# Patient Record
Sex: FEMALE | Race: BLACK OR AFRICAN AMERICAN | Employment: OTHER | URBAN - METROPOLITAN AREA
[De-identification: names, ages, dates, MRNs, and addresses within clinical notes are randomized per-mention and may not be internally consistent; named-entity substitution may affect disease eponyms.]

---

## 2017-01-09 ENCOUNTER — OFFICE VISIT (OUTPATIENT)
Dept: ORTHOPEDIC SURGERY | Age: 57
End: 2017-01-09

## 2017-01-09 VITALS
TEMPERATURE: 98.1 F | BODY MASS INDEX: 37.38 KG/M2 | DIASTOLIC BLOOD PRESSURE: 100 MMHG | SYSTOLIC BLOOD PRESSURE: 154 MMHG | HEIGHT: 61 IN | WEIGHT: 198 LBS | HEART RATE: 77 BPM

## 2017-01-09 DIAGNOSIS — M25.561 RIGHT KNEE PAIN, UNSPECIFIED CHRONICITY: Primary | ICD-10-CM

## 2017-01-09 PROCEDURE — 99213 OFFICE O/P EST LOW 20 MIN: CPT | Performed by: ORTHOPAEDIC SURGERY

## 2017-01-10 RX ORDER — TRAMADOL HYDROCHLORIDE 50 MG/1
50 TABLET ORAL EVERY 6 HOURS PRN
Qty: 60 TABLET | Refills: 0 | Status: SHIPPED | OUTPATIENT
Start: 2017-01-10 | End: 2017-02-20 | Stop reason: SDUPTHER

## 2017-01-11 ENCOUNTER — TELEPHONE (OUTPATIENT)
Dept: ORTHOPEDIC SURGERY | Age: 57
End: 2017-01-11

## 2017-01-12 ENCOUNTER — TELEPHONE (OUTPATIENT)
Dept: ORTHOPEDIC SURGERY | Age: 57
End: 2017-01-12

## 2017-02-20 ENCOUNTER — OFFICE VISIT (OUTPATIENT)
Dept: ORTHOPEDIC SURGERY | Age: 57
End: 2017-02-20

## 2017-02-20 VITALS
HEIGHT: 61 IN | TEMPERATURE: 98.6 F | HEART RATE: 79 BPM | WEIGHT: 198 LBS | SYSTOLIC BLOOD PRESSURE: 137 MMHG | BODY MASS INDEX: 37.38 KG/M2 | DIASTOLIC BLOOD PRESSURE: 95 MMHG

## 2017-02-20 DIAGNOSIS — M17.11 PRIMARY OSTEOARTHRITIS OF RIGHT KNEE: Primary | ICD-10-CM

## 2017-02-20 PROCEDURE — 99212 OFFICE O/P EST SF 10 MIN: CPT | Performed by: PHYSICIAN ASSISTANT

## 2017-02-20 RX ORDER — TRAMADOL HYDROCHLORIDE 50 MG/1
50 TABLET ORAL EVERY 6 HOURS PRN
Qty: 60 TABLET | Refills: 0 | Status: SHIPPED | OUTPATIENT
Start: 2017-02-20 | End: 2017-05-11 | Stop reason: SDUPTHER

## 2017-03-28 ENCOUNTER — OFFICE VISIT (OUTPATIENT)
Dept: ORTHOPEDIC SURGERY | Age: 57
End: 2017-03-28

## 2017-03-28 VITALS
BODY MASS INDEX: 37.38 KG/M2 | WEIGHT: 198 LBS | SYSTOLIC BLOOD PRESSURE: 147 MMHG | DIASTOLIC BLOOD PRESSURE: 87 MMHG | RESPIRATION RATE: 16 BRPM | HEART RATE: 91 BPM | TEMPERATURE: 97 F | HEIGHT: 61 IN

## 2017-03-28 DIAGNOSIS — M17.11 PRIMARY OSTEOARTHRITIS OF RIGHT KNEE: Primary | ICD-10-CM

## 2017-03-28 PROCEDURE — 99212 OFFICE O/P EST SF 10 MIN: CPT | Performed by: ORTHOPAEDIC SURGERY

## 2017-04-18 ENCOUNTER — TELEPHONE (OUTPATIENT)
Dept: ORTHOPEDIC SURGERY | Age: 57
End: 2017-04-18

## 2017-05-09 ENCOUNTER — OFFICE VISIT (OUTPATIENT)
Dept: PRIMARY CARE CLINIC | Age: 57
End: 2017-05-09

## 2017-05-09 VITALS
HEART RATE: 76 BPM | RESPIRATION RATE: 14 BRPM | HEIGHT: 61 IN | TEMPERATURE: 98.8 F | BODY MASS INDEX: 38.02 KG/M2 | SYSTOLIC BLOOD PRESSURE: 132 MMHG | DIASTOLIC BLOOD PRESSURE: 83 MMHG | OXYGEN SATURATION: 98 % | WEIGHT: 201.4 LBS

## 2017-05-09 DIAGNOSIS — Z00.00 PREVENTATIVE HEALTH CARE: Primary | ICD-10-CM

## 2017-05-09 DIAGNOSIS — Z12.39 BREAST CANCER SCREENING: ICD-10-CM

## 2017-05-09 DIAGNOSIS — J45.20 MILD INTERMITTENT ASTHMA WITHOUT COMPLICATION: ICD-10-CM

## 2017-05-09 DIAGNOSIS — K57.30 DIVERTICULOSIS OF LARGE INTESTINE WITHOUT HEMORRHAGE: ICD-10-CM

## 2017-05-09 DIAGNOSIS — Z12.4 CERVICAL CANCER SCREENING: ICD-10-CM

## 2017-05-09 DIAGNOSIS — J30.2 SEASONAL ALLERGIC RHINITIS, UNSPECIFIED ALLERGIC RHINITIS TRIGGER: ICD-10-CM

## 2017-05-09 PROCEDURE — 99386 PREV VISIT NEW AGE 40-64: CPT | Performed by: INTERNAL MEDICINE

## 2017-05-09 RX ORDER — CETIRIZINE HYDROCHLORIDE 10 MG/1
10 TABLET ORAL DAILY
Qty: 30 TABLET | Refills: 3 | Status: SHIPPED | OUTPATIENT
Start: 2017-05-09 | End: 2019-04-02

## 2017-05-09 RX ORDER — FLUTICASONE PROPIONATE 50 MCG
1 SPRAY, SUSPENSION (ML) NASAL DAILY
Qty: 1 BOTTLE | Refills: 3 | Status: SHIPPED | OUTPATIENT
Start: 2017-05-09 | End: 2019-04-02 | Stop reason: SDUPTHER

## 2017-05-09 RX ORDER — ALBUTEROL SULFATE 90 UG/1
2 AEROSOL, METERED RESPIRATORY (INHALATION) EVERY 6 HOURS PRN
Qty: 1 INHALER | Refills: 3 | Status: SHIPPED | OUTPATIENT
Start: 2017-05-09 | End: 2018-05-21 | Stop reason: SDUPTHER

## 2017-05-09 ASSESSMENT — PATIENT HEALTH QUESTIONNAIRE - PHQ9
1. LITTLE INTEREST OR PLEASURE IN DOING THINGS: 0
2. FEELING DOWN, DEPRESSED OR HOPELESS: 0
SUM OF ALL RESPONSES TO PHQ QUESTIONS 1-9: 0
SUM OF ALL RESPONSES TO PHQ9 QUESTIONS 1 & 2: 0

## 2017-05-09 ASSESSMENT — ENCOUNTER SYMPTOMS
SHORTNESS OF BREATH: 0
RHINORRHEA: 0
DIARRHEA: 0
ABDOMINAL DISTENTION: 0
CONSTIPATION: 0
NAUSEA: 0
ABDOMINAL PAIN: 0
VOMITING: 0
SORE THROAT: 0
COUGH: 0

## 2017-05-10 ENCOUNTER — TELEPHONE (OUTPATIENT)
Dept: ORTHOPEDIC SURGERY | Age: 57
End: 2017-05-10

## 2017-05-11 ENCOUNTER — TELEPHONE (OUTPATIENT)
Dept: ORTHOPEDIC SURGERY | Age: 57
End: 2017-05-11

## 2017-05-16 RX ORDER — TRAMADOL HYDROCHLORIDE 50 MG/1
TABLET ORAL
Qty: 60 TABLET | Refills: 0 | Status: SHIPPED | OUTPATIENT
Start: 2017-05-16 | End: 2017-05-30 | Stop reason: HOSPADM

## 2017-05-17 ENCOUNTER — TELEPHONE (OUTPATIENT)
Dept: ORTHOPEDIC SURGERY | Age: 57
End: 2017-05-17

## 2017-05-23 ENCOUNTER — OFFICE VISIT (OUTPATIENT)
Dept: PRIMARY CARE CLINIC | Age: 57
End: 2017-05-23

## 2017-05-23 VITALS
BODY MASS INDEX: 38.44 KG/M2 | HEIGHT: 61 IN | TEMPERATURE: 96 F | HEART RATE: 76 BPM | SYSTOLIC BLOOD PRESSURE: 132 MMHG | OXYGEN SATURATION: 97 % | RESPIRATION RATE: 12 BRPM | WEIGHT: 203.6 LBS | DIASTOLIC BLOOD PRESSURE: 82 MMHG

## 2017-05-23 DIAGNOSIS — Z01.818 PRE-OP EXAM: Primary | ICD-10-CM

## 2017-05-23 DIAGNOSIS — Z01.818 PRE-OP EXAM: ICD-10-CM

## 2017-05-23 DIAGNOSIS — Z00.00 PREVENTATIVE HEALTH CARE: ICD-10-CM

## 2017-05-23 LAB
ALBUMIN SERPL-MCNC: 4.3 G/DL (ref 3.4–5)
ALP BLD-CCNC: 79 U/L (ref 40–129)
ALT SERPL-CCNC: 25 U/L (ref 10–40)
ANION GAP SERPL CALCULATED.3IONS-SCNC: 13 MMOL/L (ref 3–16)
AST SERPL-CCNC: 24 U/L (ref 15–37)
BASOPHILS ABSOLUTE: 0 K/UL (ref 0–0.2)
BASOPHILS RELATIVE PERCENT: 0.6 %
BILIRUB SERPL-MCNC: 0.3 MG/DL (ref 0–1)
BILIRUBIN DIRECT: <0.2 MG/DL (ref 0–0.3)
BILIRUBIN, INDIRECT: NORMAL MG/DL (ref 0–1)
BUN BLDV-MCNC: 9 MG/DL (ref 7–20)
CALCIUM SERPL-MCNC: 9.8 MG/DL (ref 8.3–10.6)
CHLORIDE BLD-SCNC: 101 MMOL/L (ref 99–110)
CHOLESTEROL, TOTAL: 204 MG/DL (ref 0–199)
CO2: 28 MMOL/L (ref 21–32)
CREAT SERPL-MCNC: 0.7 MG/DL (ref 0.6–1.1)
EOSINOPHILS ABSOLUTE: 0.4 K/UL (ref 0–0.6)
EOSINOPHILS RELATIVE PERCENT: 6 %
GFR AFRICAN AMERICAN: >60
GFR NON-AFRICAN AMERICAN: >60
GLUCOSE BLD-MCNC: 84 MG/DL (ref 70–99)
HCT VFR BLD CALC: 43.5 % (ref 36–48)
HDLC SERPL-MCNC: 59 MG/DL (ref 40–60)
HEMOGLOBIN: 13.4 G/DL (ref 12–16)
HEPATITIS C ANTIBODY INTERPRETATION: NORMAL
INR BLD: 0.99 (ref 0.85–1.15)
LDL CHOLESTEROL CALCULATED: 114 MG/DL
LYMPHOCYTES ABSOLUTE: 1.5 K/UL (ref 1–5.1)
LYMPHOCYTES RELATIVE PERCENT: 24.2 %
MCH RBC QN AUTO: 25.7 PG (ref 26–34)
MCHC RBC AUTO-ENTMCNC: 30.7 G/DL (ref 31–36)
MCV RBC AUTO: 83.8 FL (ref 80–100)
MONOCYTES ABSOLUTE: 0.3 K/UL (ref 0–1.3)
MONOCYTES RELATIVE PERCENT: 4.8 %
NEUTROPHILS ABSOLUTE: 3.9 K/UL (ref 1.7–7.7)
NEUTROPHILS RELATIVE PERCENT: 64.4 %
PDW BLD-RTO: 15.3 % (ref 12.4–15.4)
PHOSPHORUS: 3.2 MG/DL (ref 2.5–4.9)
PLATELET # BLD: 213 K/UL (ref 135–450)
PMV BLD AUTO: 9.2 FL (ref 5–10.5)
POTASSIUM SERPL-SCNC: 4.5 MMOL/L (ref 3.5–5.1)
PROTHROMBIN TIME: 11.2 SEC (ref 9.6–13)
RBC # BLD: 5.19 M/UL (ref 4–5.2)
SODIUM BLD-SCNC: 142 MMOL/L (ref 136–145)
TOTAL PROTEIN: 7.4 G/DL (ref 6.4–8.2)
TRIGL SERPL-MCNC: 153 MG/DL (ref 0–150)
TROPONIN: <0.01 NG/ML
TSH REFLEX: 2.01 UIU/ML (ref 0.27–4.2)
VLDLC SERPL CALC-MCNC: 31 MG/DL
WBC # BLD: 6.1 K/UL (ref 4–11)

## 2017-05-23 PROCEDURE — 93000 ELECTROCARDIOGRAM COMPLETE: CPT | Performed by: INTERNAL MEDICINE

## 2017-05-23 PROCEDURE — 99214 OFFICE O/P EST MOD 30 MIN: CPT | Performed by: INTERNAL MEDICINE

## 2017-05-23 ASSESSMENT — ENCOUNTER SYMPTOMS
SHORTNESS OF BREATH: 0
VOMITING: 0
ABDOMINAL PAIN: 0
NAUSEA: 0
DIARRHEA: 0

## 2017-05-24 ENCOUNTER — CLINICAL DOCUMENTATION (OUTPATIENT)
Dept: PRIMARY CARE CLINIC | Age: 57
End: 2017-05-24

## 2017-05-24 PROBLEM — E78.2 MIXED HYPERLIPIDEMIA: Status: ACTIVE | Noted: 2017-05-24

## 2017-05-24 LAB
ESTIMATED AVERAGE GLUCOSE: 134.1 MG/DL
HBA1C MFR BLD: 6.3 %

## 2017-05-25 ENCOUNTER — PAT TELEPHONE (OUTPATIENT)
Dept: PREADMISSION TESTING | Age: 57
End: 2017-05-25

## 2017-05-25 ENCOUNTER — SURG/PROC ORDERS (OUTPATIENT)
Dept: ANESTHESIOLOGY | Age: 57
End: 2017-05-25

## 2017-05-25 ENCOUNTER — OFFICE VISIT (OUTPATIENT)
Dept: ORTHOPEDIC SURGERY | Age: 57
End: 2017-05-25

## 2017-05-25 VITALS
HEIGHT: 61 IN | TEMPERATURE: 97.1 F | DIASTOLIC BLOOD PRESSURE: 90 MMHG | HEART RATE: 74 BPM | SYSTOLIC BLOOD PRESSURE: 150 MMHG | BODY MASS INDEX: 38.33 KG/M2 | WEIGHT: 203 LBS

## 2017-05-25 DIAGNOSIS — S83.241A TEAR OF MEDIAL MENISCUS OF RIGHT KNEE, CURRENT, UNSPECIFIED TEAR TYPE, INITIAL ENCOUNTER: Primary | ICD-10-CM

## 2017-05-25 LAB — HIV-1 AND HIV-2 ANTIBODIES: NEGATIVE

## 2017-05-25 PROCEDURE — 99214 OFFICE O/P EST MOD 30 MIN: CPT | Performed by: ORTHOPAEDIC SURGERY

## 2017-05-25 RX ORDER — SODIUM CHLORIDE 9 MG/ML
INJECTION, SOLUTION INTRAVENOUS CONTINUOUS
Status: CANCELLED | OUTPATIENT
Start: 2017-05-25

## 2017-05-25 RX ORDER — SODIUM CHLORIDE 0.9 % (FLUSH) 0.9 %
10 SYRINGE (ML) INJECTION PRN
Status: CANCELLED | OUTPATIENT
Start: 2017-05-25

## 2017-05-25 RX ORDER — SODIUM CHLORIDE 0.9 % (FLUSH) 0.9 %
10 SYRINGE (ML) INJECTION EVERY 12 HOURS SCHEDULED
Status: CANCELLED | OUTPATIENT
Start: 2017-05-25

## 2017-05-30 ENCOUNTER — HOSPITAL ENCOUNTER (OUTPATIENT)
Dept: SURGERY | Age: 57
Discharge: HOME | End: 2017-05-30
Attending: ORTHOPAEDIC SURGERY | Admitting: ORTHOPAEDIC SURGERY

## 2017-05-30 VITALS
HEIGHT: 61 IN | HEART RATE: 61 BPM | TEMPERATURE: 97 F | OXYGEN SATURATION: 95 % | BODY MASS INDEX: 37.81 KG/M2 | DIASTOLIC BLOOD PRESSURE: 94 MMHG | SYSTOLIC BLOOD PRESSURE: 148 MMHG | WEIGHT: 200.29 LBS | RESPIRATION RATE: 16 BRPM

## 2017-05-30 LAB
GLUCOSE BLD-MCNC: 74 MG/DL (ref 70–99)
PERFORMED ON: NORMAL

## 2017-05-30 RX ORDER — SODIUM CHLORIDE 0.9 % (FLUSH) 0.9 %
10 SYRINGE (ML) INJECTION PRN
Status: DISCONTINUED | OUTPATIENT
Start: 2017-05-30 | End: 2017-05-31 | Stop reason: HOSPADM

## 2017-05-30 RX ORDER — SODIUM CHLORIDE 0.9 % (FLUSH) 0.9 %
10 SYRINGE (ML) INJECTION EVERY 12 HOURS SCHEDULED
Status: DISCONTINUED | OUTPATIENT
Start: 2017-05-30 | End: 2017-05-31 | Stop reason: HOSPADM

## 2017-05-30 RX ORDER — LABETALOL HYDROCHLORIDE 5 MG/ML
5 INJECTION, SOLUTION INTRAVENOUS EVERY 10 MIN PRN
Status: DISCONTINUED | OUTPATIENT
Start: 2017-05-30 | End: 2017-05-31 | Stop reason: HOSPADM

## 2017-05-30 RX ORDER — FENTANYL CITRATE 50 UG/ML
25 INJECTION, SOLUTION INTRAMUSCULAR; INTRAVENOUS EVERY 5 MIN PRN
Status: DISCONTINUED | OUTPATIENT
Start: 2017-05-30 | End: 2017-05-31 | Stop reason: HOSPADM

## 2017-05-30 RX ORDER — SODIUM CHLORIDE 9 MG/ML
INJECTION, SOLUTION INTRAVENOUS CONTINUOUS
Status: DISCONTINUED | OUTPATIENT
Start: 2017-05-30 | End: 2017-05-31 | Stop reason: HOSPADM

## 2017-05-30 RX ORDER — OXYCODONE HYDROCHLORIDE AND ACETAMINOPHEN 5; 325 MG/1; MG/1
2 TABLET ORAL EVERY 4 HOURS PRN
Status: DISCONTINUED | OUTPATIENT
Start: 2017-05-30 | End: 2017-05-31 | Stop reason: HOSPADM

## 2017-05-30 RX ORDER — OXYCODONE HYDROCHLORIDE AND ACETAMINOPHEN 5; 325 MG/1; MG/1
TABLET ORAL
Qty: 30 TABLET | Refills: 0 | Status: SHIPPED | OUTPATIENT
Start: 2017-05-30 | End: 2017-06-19 | Stop reason: SDUPTHER

## 2017-05-30 RX ORDER — PROMETHAZINE HYDROCHLORIDE 25 MG/ML
6.25 INJECTION, SOLUTION INTRAMUSCULAR; INTRAVENOUS
Status: ACTIVE | OUTPATIENT
Start: 2017-05-30 | End: 2017-05-30

## 2017-05-30 RX ADMIN — SODIUM CHLORIDE: 9 INJECTION, SOLUTION INTRAVENOUS at 14:01

## 2017-05-30 RX ADMIN — OXYCODONE HYDROCHLORIDE AND ACETAMINOPHEN 2 TABLET: 5; 325 TABLET ORAL at 16:34

## 2017-05-30 ASSESSMENT — PAIN SCALES - GENERAL
PAINLEVEL_OUTOF10: 10
PAINLEVEL_OUTOF10: 8
PAINLEVEL_OUTOF10: 10
PAINLEVEL_OUTOF10: 0

## 2017-05-30 ASSESSMENT — PAIN DESCRIPTION - DESCRIPTORS: DESCRIPTORS: ACHING

## 2017-05-30 ASSESSMENT — PAIN - FUNCTIONAL ASSESSMENT: PAIN_FUNCTIONAL_ASSESSMENT: 0-10

## 2017-05-30 ASSESSMENT — PAIN DESCRIPTION - ORIENTATION
ORIENTATION: RIGHT
ORIENTATION: RIGHT

## 2017-05-30 ASSESSMENT — PAIN DESCRIPTION - LOCATION
LOCATION: KNEE
LOCATION: KNEE

## 2017-05-30 ASSESSMENT — PAIN DESCRIPTION - PAIN TYPE
TYPE: SURGICAL PAIN
TYPE: SURGICAL PAIN

## 2017-05-31 ENCOUNTER — TELEPHONE (OUTPATIENT)
Dept: ORTHOPEDIC SURGERY | Age: 57
End: 2017-05-31

## 2017-06-19 ENCOUNTER — OFFICE VISIT (OUTPATIENT)
Dept: ORTHOPEDIC SURGERY | Age: 57
End: 2017-06-19

## 2017-06-19 VITALS — BODY MASS INDEX: 37.76 KG/M2 | WEIGHT: 200 LBS | HEIGHT: 61 IN | TEMPERATURE: 97.3 F

## 2017-06-19 DIAGNOSIS — S83.241A TEAR OF MEDIAL MENISCUS OF RIGHT KNEE, CURRENT, UNSPECIFIED TEAR TYPE, INITIAL ENCOUNTER: Primary | ICD-10-CM

## 2017-06-19 PROCEDURE — 99024 POSTOP FOLLOW-UP VISIT: CPT | Performed by: ORTHOPAEDIC SURGERY

## 2017-06-19 RX ORDER — OXYCODONE HYDROCHLORIDE AND ACETAMINOPHEN 5; 325 MG/1; MG/1
TABLET ORAL
Qty: 30 TABLET | Refills: 0 | Status: SHIPPED | OUTPATIENT
Start: 2017-06-19 | End: 2018-02-14

## 2017-06-28 ENCOUNTER — HOSPITAL ENCOUNTER (OUTPATIENT)
Dept: PHYSICAL THERAPY | Age: 57
Discharge: HOME | End: 2017-06-30
Admitting: ORTHOPAEDIC SURGERY

## 2017-06-30 ENCOUNTER — HOSPITAL ENCOUNTER (OUTPATIENT)
Dept: PHYSICAL THERAPY | Age: 57
Discharge: HOME | End: 2017-06-30
Admitting: ORTHOPAEDIC SURGERY

## 2017-07-01 ENCOUNTER — HOSPITAL ENCOUNTER (OUTPATIENT)
Dept: PHYSICAL THERAPY | Age: 57
Discharge: HOME | End: 2017-07-31
Attending: ORTHOPAEDIC SURGERY | Admitting: ORTHOPAEDIC SURGERY

## 2017-07-11 ENCOUNTER — OFFICE VISIT (OUTPATIENT)
Dept: PRIMARY CARE CLINIC | Age: 57
End: 2017-07-11

## 2017-07-11 VITALS
WEIGHT: 209.8 LBS | SYSTOLIC BLOOD PRESSURE: 130 MMHG | OXYGEN SATURATION: 97 % | RESPIRATION RATE: 12 BRPM | DIASTOLIC BLOOD PRESSURE: 86 MMHG | BODY MASS INDEX: 39.64 KG/M2 | TEMPERATURE: 97.2 F | HEART RATE: 78 BPM

## 2017-07-11 DIAGNOSIS — Z23 NEED FOR 23-POLYVALENT PNEUMOCOCCAL POLYSACCHARIDE VACCINE: ICD-10-CM

## 2017-07-11 DIAGNOSIS — Z12.4 CERVICAL CANCER SCREENING: ICD-10-CM

## 2017-07-11 DIAGNOSIS — E66.01 MORBID OBESITY DUE TO EXCESS CALORIES (HCC): Primary | ICD-10-CM

## 2017-07-11 DIAGNOSIS — Z09 SURGERY FOLLOW-UP: ICD-10-CM

## 2017-07-11 DIAGNOSIS — Z12.39 BREAST CANCER SCREENING: ICD-10-CM

## 2017-07-11 PROCEDURE — 90732 PPSV23 VACC 2 YRS+ SUBQ/IM: CPT | Performed by: INTERNAL MEDICINE

## 2017-07-11 PROCEDURE — 99214 OFFICE O/P EST MOD 30 MIN: CPT | Performed by: INTERNAL MEDICINE

## 2017-07-11 PROCEDURE — 90471 IMMUNIZATION ADMIN: CPT | Performed by: INTERNAL MEDICINE

## 2017-07-11 RX ORDER — POLYETHYLENE GLYCOL 400 2.5 MG/ML
SOLUTION/ DROPS OPHTHALMIC
Refills: 8 | COMMUNITY
Start: 2017-06-27 | End: 2020-02-03 | Stop reason: ALTCHOICE

## 2017-07-11 RX ORDER — AZELASTINE HYDROCHLORIDE 0.5 MG/ML
SOLUTION/ DROPS OPHTHALMIC
Refills: 8 | COMMUNITY
Start: 2017-06-27 | End: 2020-02-03 | Stop reason: ALTCHOICE

## 2017-07-11 ASSESSMENT — ENCOUNTER SYMPTOMS
VOMITING: 0
COUGH: 0
SHORTNESS OF BREATH: 0
ABDOMINAL PAIN: 0
DIARRHEA: 0
NAUSEA: 0

## 2017-07-31 ENCOUNTER — OFFICE VISIT (OUTPATIENT)
Dept: ORTHOPEDIC SURGERY | Age: 57
End: 2017-07-31

## 2017-07-31 VITALS — HEIGHT: 61 IN | RESPIRATION RATE: 16 BRPM | BODY MASS INDEX: 39.46 KG/M2 | TEMPERATURE: 98.5 F | WEIGHT: 209 LBS

## 2017-07-31 DIAGNOSIS — S83.241A TEAR OF MEDIAL MENISCUS OF RIGHT KNEE, CURRENT, UNSPECIFIED TEAR TYPE, INITIAL ENCOUNTER: Primary | ICD-10-CM

## 2017-07-31 PROCEDURE — 99024 POSTOP FOLLOW-UP VISIT: CPT | Performed by: ORTHOPAEDIC SURGERY

## 2017-08-31 ENCOUNTER — OFFICE VISIT (OUTPATIENT)
Dept: ORTHOPEDIC SURGERY | Age: 57
End: 2017-08-31

## 2017-08-31 VITALS — BODY MASS INDEX: 39.46 KG/M2 | WEIGHT: 209 LBS | TEMPERATURE: 98.2 F | HEIGHT: 61 IN | RESPIRATION RATE: 16 BRPM

## 2017-08-31 DIAGNOSIS — S83.241A TEAR OF MEDIAL MENISCUS OF RIGHT KNEE, CURRENT, UNSPECIFIED TEAR TYPE, INITIAL ENCOUNTER: Primary | ICD-10-CM

## 2017-08-31 PROCEDURE — 99213 OFFICE O/P EST LOW 20 MIN: CPT | Performed by: PHYSICIAN ASSISTANT

## 2017-08-31 RX ORDER — TRAMADOL HYDROCHLORIDE 50 MG/1
50 TABLET ORAL EVERY 6 HOURS PRN
Qty: 28 TABLET | Refills: 0 | Status: SHIPPED | OUTPATIENT
Start: 2017-08-31 | End: 2017-09-10

## 2017-08-31 RX ORDER — NAPROXEN 500 MG/1
500 TABLET ORAL 2 TIMES DAILY WITH MEALS
Qty: 60 TABLET | Refills: 3 | Status: SHIPPED | OUTPATIENT
Start: 2017-08-31 | End: 2018-02-14

## 2017-09-25 ENCOUNTER — HOSPITAL ENCOUNTER (OUTPATIENT)
Dept: MAMMOGRAPHY | Age: 57
Discharge: HOME | End: 2017-09-25
Attending: INTERNAL MEDICINE | Admitting: INTERNAL MEDICINE

## 2017-09-25 DIAGNOSIS — Z12.31 VISIT FOR SCREENING MAMMOGRAM: ICD-10-CM

## 2017-11-28 ENCOUNTER — OFFICE VISIT (OUTPATIENT)
Dept: GYNECOLOGY | Age: 57
End: 2017-11-28

## 2017-11-28 VITALS
RESPIRATION RATE: 17 BRPM | WEIGHT: 209.25 LBS | HEART RATE: 73 BPM | DIASTOLIC BLOOD PRESSURE: 103 MMHG | HEIGHT: 61 IN | BODY MASS INDEX: 39.5 KG/M2 | SYSTOLIC BLOOD PRESSURE: 146 MMHG | TEMPERATURE: 96.4 F

## 2017-11-28 DIAGNOSIS — Z01.419 WELL WOMAN EXAM WITH ROUTINE GYNECOLOGICAL EXAM: Primary | ICD-10-CM

## 2017-11-28 PROCEDURE — 99386 PREV VISIT NEW AGE 40-64: CPT | Performed by: OBSTETRICS & GYNECOLOGY

## 2017-11-28 RX ORDER — TRAMADOL HYDROCHLORIDE 50 MG/1
TABLET ORAL
COMMUNITY
Start: 2016-06-01 | End: 2018-03-27

## 2017-11-28 ASSESSMENT — ENCOUNTER SYMPTOMS
RESPIRATORY NEGATIVE: 1
GASTROINTESTINAL NEGATIVE: 1
EYES NEGATIVE: 1

## 2017-11-28 NOTE — PROGRESS NOTES
Subjective:      Patient ID: Isidra Giordano is a 56 y.o. female. Patient is here for annual. Patient overall doing well. Review of Systems   Constitutional: Negative. HENT: Negative. Eyes: Negative. Respiratory: Negative. Cardiovascular: Negative. Gastrointestinal: Negative. Genitourinary: Negative. Musculoskeletal: Positive for arthralgias. Skin: Negative. Neurological: Negative. Psychiatric/Behavioral: Negative.       Date of Birth 1960  Past Medical History:   Diagnosis Date    Allergic rhinitis     Arthritis     Asthma     not currently medically treated    Hyperlipidemia     Neuropathy ([facility] [address]     Osteoarthritis     Wears glasses      Past Surgical History:   Procedure Laterality Date    CARPAL TUNNEL RELEASE Right 7-10-14    FOOT SURGERY Right     rods placed     OB History    Para Term  AB Living   6 6 6     6   SAB TAB Ectopic Molar Multiple Live Births           1        # Outcome Date GA Lbr Lior/2nd Weight Sex Delivery Anes PTL Lv   6 Term  40w0d   M Vag-Spont      5 Term 12 37w0d   M Vag-Spont      4 Term 46 37w0d   F Vag-Spont      3 Term 12 37w0d   M Vag-Spont      2 Term 26 37w0d   F Vag-Spont      1 Term 36 37w0d   M Vag-Spont           Social History     Social History    Marital status: Single     Spouse name: N/A    Number of children: 10    Years of education: N/A     Occupational History    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 Abdominal: Soft. Bowel sounds are normal. She exhibits no distension and no mass. There is no hepatomegaly. There is no tenderness. There is no rebound and no guarding. No hernia. Genitourinary: Rectum normal, vagina normal and uterus normal. Rectal exam shows no external hemorrhoid, no internal hemorrhoid, no fissure, no mass, no tenderness and guaiac negative stool. No breast swelling, tenderness, discharge or bleeding. There is no rash, tenderness, lesion or injury on the right labia. There is no rash, tenderness, lesion or injury on the left labia. Uterus is not deviated, not enlarged, not fixed and not tender. Cervix exhibits no motion tenderness, no discharge and no friability. Right adnexum displays no mass, no tenderness and no fullness. Left adnexum displays no mass, no tenderness and no fullness. No erythema, tenderness or bleeding in the vagina. No foreign body in the vagina. No signs of injury around the vagina. No vaginal discharge found. Genitourinary Comments: Normal urethral meatus, nl urethra, nl bladder. Musculoskeletal: Normal range of motion. She exhibits no edema or tenderness. Lymphadenopathy:     She has no cervical adenopathy. Right: No inguinal adenopathy present. Left: No inguinal adenopathy present. Neurological: She is alert and oriented to person, place, and time. She has normal reflexes. Skin: Skin is warm and dry. No rash noted. She is not diaphoretic. No erythema. Psychiatric: She has a normal mood and affect. Her behavior is normal. Judgment and thought content normal.       Assessment:       1. Annual  2. Menopause      Plan:      1. Pap, calcium, exercise, mammogram, hemocult negative  2.  Stable symptoms

## 2017-11-30 LAB
HPV COMMENT: NORMAL
HPV TYPE 16: NOT DETECTED
HPV TYPE 18: NOT DETECTED
HPVOH (OTHER TYPES): NOT DETECTED

## 2018-02-14 ENCOUNTER — OFFICE VISIT (OUTPATIENT)
Dept: PRIMARY CARE CLINIC | Age: 58
End: 2018-02-14

## 2018-02-14 VITALS
BODY MASS INDEX: 39.65 KG/M2 | DIASTOLIC BLOOD PRESSURE: 73 MMHG | SYSTOLIC BLOOD PRESSURE: 140 MMHG | WEIGHT: 210 LBS | TEMPERATURE: 97.9 F | OXYGEN SATURATION: 94 % | HEIGHT: 61 IN | HEART RATE: 80 BPM

## 2018-02-14 DIAGNOSIS — Z12.11 COLON CANCER SCREENING: ICD-10-CM

## 2018-02-14 DIAGNOSIS — I10 ESSENTIAL HYPERTENSION: ICD-10-CM

## 2018-02-14 DIAGNOSIS — I10 ESSENTIAL HYPERTENSION: Primary | ICD-10-CM

## 2018-02-14 DIAGNOSIS — Z00.00 PREVENTATIVE HEALTH CARE: ICD-10-CM

## 2018-02-14 LAB
ALBUMIN SERPL-MCNC: 4.4 G/DL (ref 3.4–5)
ALP BLD-CCNC: 71 U/L (ref 40–129)
ALT SERPL-CCNC: 13 U/L (ref 10–40)
ANION GAP SERPL CALCULATED.3IONS-SCNC: 13 MMOL/L (ref 3–16)
AST SERPL-CCNC: 16 U/L (ref 15–37)
BASOPHILS ABSOLUTE: 0 K/UL (ref 0–0.2)
BASOPHILS RELATIVE PERCENT: 0.5 %
BILIRUB SERPL-MCNC: 0.3 MG/DL (ref 0–1)
BILIRUBIN DIRECT: <0.2 MG/DL (ref 0–0.3)
BILIRUBIN, INDIRECT: NORMAL MG/DL (ref 0–1)
BUN BLDV-MCNC: 8 MG/DL (ref 7–20)
CALCIUM SERPL-MCNC: 9.4 MG/DL (ref 8.3–10.6)
CHLORIDE BLD-SCNC: 101 MMOL/L (ref 99–110)
CHOLESTEROL, TOTAL: 190 MG/DL (ref 0–199)
CO2: 28 MMOL/L (ref 21–32)
CREAT SERPL-MCNC: 0.7 MG/DL (ref 0.6–1.1)
EOSINOPHILS ABSOLUTE: 0.2 K/UL (ref 0–0.6)
EOSINOPHILS RELATIVE PERCENT: 3.4 %
GFR AFRICAN AMERICAN: >60
GFR NON-AFRICAN AMERICAN: >60
GLUCOSE BLD-MCNC: 101 MG/DL (ref 70–99)
HCT VFR BLD CALC: 42.1 % (ref 36–48)
HDLC SERPL-MCNC: 57 MG/DL (ref 40–60)
HEMOGLOBIN: 13.4 G/DL (ref 12–16)
LDL CHOLESTEROL CALCULATED: 102 MG/DL
LYMPHOCYTES ABSOLUTE: 1.7 K/UL (ref 1–5.1)
LYMPHOCYTES RELATIVE PERCENT: 23.6 %
MCH RBC QN AUTO: 26.9 PG (ref 26–34)
MCHC RBC AUTO-ENTMCNC: 31.9 G/DL (ref 31–36)
MCV RBC AUTO: 84.3 FL (ref 80–100)
MONOCYTES ABSOLUTE: 0.5 K/UL (ref 0–1.3)
MONOCYTES RELATIVE PERCENT: 6.1 %
NEUTROPHILS ABSOLUTE: 4.9 K/UL (ref 1.7–7.7)
NEUTROPHILS RELATIVE PERCENT: 66.4 %
PDW BLD-RTO: 15.9 % (ref 12.4–15.4)
PHOSPHORUS: 3.1 MG/DL (ref 2.5–4.9)
PLATELET # BLD: 224 K/UL (ref 135–450)
PMV BLD AUTO: 9.8 FL (ref 5–10.5)
POTASSIUM SERPL-SCNC: 4.2 MMOL/L (ref 3.5–5.1)
RBC # BLD: 4.99 M/UL (ref 4–5.2)
SODIUM BLD-SCNC: 142 MMOL/L (ref 136–145)
TOTAL PROTEIN: 7.3 G/DL (ref 6.4–8.2)
TRIGL SERPL-MCNC: 157 MG/DL (ref 0–150)
TSH REFLEX: 3.76 UIU/ML (ref 0.27–4.2)
VLDLC SERPL CALC-MCNC: 31 MG/DL
WBC # BLD: 7.4 K/UL (ref 4–11)

## 2018-02-14 PROCEDURE — G8417 CALC BMI ABV UP PARAM F/U: HCPCS | Performed by: INTERNAL MEDICINE

## 2018-02-14 PROCEDURE — G8484 FLU IMMUNIZE NO ADMIN: HCPCS | Performed by: INTERNAL MEDICINE

## 2018-02-14 PROCEDURE — 3014F SCREEN MAMMO DOC REV: CPT | Performed by: INTERNAL MEDICINE

## 2018-02-14 PROCEDURE — 3017F COLORECTAL CA SCREEN DOC REV: CPT | Performed by: INTERNAL MEDICINE

## 2018-02-14 PROCEDURE — 99214 OFFICE O/P EST MOD 30 MIN: CPT | Performed by: INTERNAL MEDICINE

## 2018-02-14 PROCEDURE — G8427 DOCREV CUR MEDS BY ELIG CLIN: HCPCS | Performed by: INTERNAL MEDICINE

## 2018-02-14 PROCEDURE — 1036F TOBACCO NON-USER: CPT | Performed by: INTERNAL MEDICINE

## 2018-02-14 RX ORDER — HYDROCHLOROTHIAZIDE 12.5 MG/1
12.5 TABLET ORAL DAILY
Qty: 30 TABLET | Refills: 3 | Status: SHIPPED | OUTPATIENT
Start: 2018-02-14 | End: 2018-03-27 | Stop reason: SDUPTHER

## 2018-02-15 LAB
ESTIMATED AVERAGE GLUCOSE: 131.2 MG/DL
HBA1C MFR BLD: 6.2 %

## 2018-02-16 PROBLEM — I10 ESSENTIAL HYPERTENSION: Status: ACTIVE | Noted: 2018-02-16

## 2018-02-16 ASSESSMENT — ENCOUNTER SYMPTOMS
SHORTNESS OF BREATH: 0
VOMITING: 0
NAUSEA: 0
DIARRHEA: 0
ABDOMINAL PAIN: 0
COUGH: 0
CONSTIPATION: 0

## 2018-03-27 ENCOUNTER — OFFICE VISIT (OUTPATIENT)
Dept: PRIMARY CARE CLINIC | Age: 58
End: 2018-03-27

## 2018-03-27 VITALS
BODY MASS INDEX: 38.99 KG/M2 | DIASTOLIC BLOOD PRESSURE: 82 MMHG | TEMPERATURE: 98.2 F | WEIGHT: 206.5 LBS | SYSTOLIC BLOOD PRESSURE: 118 MMHG | HEIGHT: 61 IN | OXYGEN SATURATION: 94 % | HEART RATE: 79 BPM

## 2018-03-27 DIAGNOSIS — M25.561 CHRONIC PAIN OF RIGHT KNEE: Primary | ICD-10-CM

## 2018-03-27 DIAGNOSIS — G89.29 CHRONIC PAIN OF RIGHT KNEE: Primary | ICD-10-CM

## 2018-03-27 DIAGNOSIS — I10 ESSENTIAL HYPERTENSION: ICD-10-CM

## 2018-03-27 PROCEDURE — 3014F SCREEN MAMMO DOC REV: CPT | Performed by: INTERNAL MEDICINE

## 2018-03-27 PROCEDURE — 99214 OFFICE O/P EST MOD 30 MIN: CPT | Performed by: INTERNAL MEDICINE

## 2018-03-27 PROCEDURE — 3017F COLORECTAL CA SCREEN DOC REV: CPT | Performed by: INTERNAL MEDICINE

## 2018-03-27 PROCEDURE — G8484 FLU IMMUNIZE NO ADMIN: HCPCS | Performed by: INTERNAL MEDICINE

## 2018-03-27 PROCEDURE — G8427 DOCREV CUR MEDS BY ELIG CLIN: HCPCS | Performed by: INTERNAL MEDICINE

## 2018-03-27 PROCEDURE — G8417 CALC BMI ABV UP PARAM F/U: HCPCS | Performed by: INTERNAL MEDICINE

## 2018-03-27 PROCEDURE — 1036F TOBACCO NON-USER: CPT | Performed by: INTERNAL MEDICINE

## 2018-03-27 RX ORDER — HYDROCHLOROTHIAZIDE 12.5 MG/1
12.5 TABLET ORAL DAILY
Qty: 30 TABLET | Refills: 3 | Status: SHIPPED | OUTPATIENT
Start: 2018-03-27 | End: 2018-09-27 | Stop reason: SDUPTHER

## 2018-03-27 RX ORDER — TRAMADOL HYDROCHLORIDE 50 MG/1
50 TABLET ORAL EVERY 6 HOURS PRN
Qty: 28 TABLET | Refills: 0 | Status: SHIPPED | OUTPATIENT
Start: 2018-03-27 | End: 2018-04-03

## 2018-03-27 ASSESSMENT — ENCOUNTER SYMPTOMS
CONSTIPATION: 0
SHORTNESS OF BREATH: 0
VOMITING: 0
ABDOMINAL PAIN: 0
COUGH: 0
NAUSEA: 0
DIARRHEA: 0

## 2018-03-27 NOTE — PATIENT INSTRUCTIONS
you take decongestants or anti-inflammatory medicine, such as ibuprofen. Some of these medicines can raise blood pressure. · Learn how to check your blood pressure at home. Lifestyle changes  · Stay at a healthy weight. This is especially important if you put on weight around the waist. Losing even 10 pounds can help you lower your blood pressure. · If your doctor recommends it, get more exercise. Walking is a good choice. Bit by bit, increase the amount you walk every day. Try for at least 30 minutes on most days of the week. You also may want to swim, bike, or do other activities. · Avoid or limit alcohol. Talk to your doctor about whether you can drink any alcohol. · Try to limit how much sodium you eat to less than 2,300 milligrams (mg) a day. Your doctor may ask you to try to eat less than 1,500 mg a day. · Eat plenty of fruits (such as bananas and oranges), vegetables, legumes, whole grains, and low-fat dairy products. · Lower the amount of saturated fat in your diet. Saturated fat is found in animal products such as milk, cheese, and meat. Limiting these foods may help you lose weight and also lower your risk for heart disease. · Do not smoke. Smoking increases your risk for heart attack and stroke. If you need help quitting, talk to your doctor about stop-smoking programs and medicines. These can increase your chances of quitting for good. When should you call for help? Call 911 anytime you think you may need emergency care. This may mean having symptoms that suggest that your blood pressure is causing a serious heart or blood vessel problem. Your blood pressure may be over 180/110. ? For example, call 911 if:  ? · You have symptoms of a heart attack. These may include:  ¨ Chest pain or pressure, or a strange feeling in the chest.  ¨ Sweating. ¨ Shortness of breath. ¨ Nausea or vomiting.   ¨ Pain, pressure, or a strange feeling in the back, neck, jaw, or upper belly or in one or both shoulders or arms.  ¨ Lightheadedness or sudden weakness. ¨ A fast or irregular heartbeat. ? · You have symptoms of a stroke. These may include:  ¨ Sudden numbness, tingling, weakness, or loss of movement in your face, arm, or leg, especially on only one side of your body. ¨ Sudden vision changes. ¨ Sudden trouble speaking. ¨ Sudden confusion or trouble understanding simple statements. ¨ Sudden problems with walking or balance. ¨ A sudden, severe headache that is different from past headaches. ? · You have severe back or belly pain. ?Do not wait until your blood pressure comes down on its own. Get help right away. ?Call your doctor now or seek immediate care if:  ? · Your blood pressure is much higher than normal (such as 180/110 or higher), but you don't have symptoms. ? · You think high blood pressure is causing symptoms, such as:  ¨ Severe headache. ¨ Blurry vision. ? Watch closely for changes in your health, and be sure to contact your doctor if:  ? · Your blood pressure measures 140/90 or higher at least 2 times. That means the top number is 140 or higher or the bottom number is 90 or higher, or both. ? · You think you may be having side effects from your blood pressure medicine. ? · Your blood pressure is usually normal, but it goes above normal at least 2 times. Where can you learn more? Go to https://[employer]. org and sign in to your [employer] account. Enter F768 in the Swipe.to box to learn more about \"High Blood Pressure: Care Instructions. \"     If you do not have an account, please click on the \"Sign Up Now\" link. Current as of: Annetta 10, 2017  Content Version: 11.5  © 7847-1516 [employer]. Care instructions adapted under license by [facility] ([facility]).  If you have questions about a medical condition or this instruction, always ask your healthcare professional. Mateusz Ugarte any warranty or liability for your use of this information.

## 2018-03-27 NOTE — PROGRESS NOTES
Chief Complaint   Patient presents with    Hypertension     1 mo follow up          HPI:  Saniya Kenyon is a 56 y.o. female, with a history of asthma, carpal tunnel syndrome in right hand s/p release surgery, seasonal allergies, and diverticulosis, who presents for a follow up visit      Hypertension:   Ms. Jackson Burnette presents for follow up of her blood pressure. Pt's blood pressure is well controlled today. She has been taking HCTZ for her blood pressure. Pt schuyler any side effects from the medication. Right Knee Pain:   Ms. Jackson Burnette is still having trouble with her right knee. She had sugery to repair ligament damage in that knee within the past year. She does not think she is healing well from it. She already completed physical therapy. Pt asks for refill of tramadol for pain. Current Outpatient Prescriptions   Medication Sig Dispense Refill    hydrochlorothiazide (HYDRODIURIL) 12.5 MG tablet Take 1 tablet by mouth daily 30 tablet 3    traMADol (ULTRAM) 50 MG tablet Take 1 tablet by mouth every 6 hours as needed for Pain      BLINK TEARS 0.25 % SOLN PLACE 1 DROP IN EACH EYE 2-4 TIMES D PRN  8    azelastine (OPTIVAR) 0.05 % ophthalmic solution PLACE 1 DROP IN EACH EYE BID PRN  8    orlistat (YING) 60 MG capsule Take 1 capsule by mouth 3 times daily (with meals) 90 capsule 3    albuterol sulfate  (90 BASE) MCG/ACT inhaler Inhale 2 puffs into the lungs every 6 hours as needed for Wheezing 1 Inhaler 3    fluticasone (FLONASE) 50 MCG/ACT nasal spray 1 spray by Nasal route daily 1 Bottle 3    cetirizine (ZYRTEC ALLERGY) 10 MG tablet Take 1 tablet by mouth daily 30 tablet 3    acetaminophen (TYLENOL) 500 MG tablet Take 1,000 mg by mouth every 4 hours as needed for Pain       No current facility-administered medications for this visit. No Known Allergies    Review of Systems   Constitutional: Negative for chills, fatigue and fever. HENT: Negative for congestion.     Eyes: Negative for visual disturbance. Respiratory: Negative for cough and shortness of breath. Cardiovascular: Negative for chest pain and leg swelling. Gastrointestinal: Negative for abdominal pain, constipation, diarrhea, nausea and vomiting. Skin: Negative for rash. Neurological: Negative for dizziness, light-headedness and headaches. Psychiatric/Behavioral: Negative for dysphoric mood. Vitals:    03/27/18 1337 03/27/18 1338   BP: (!) 148/88 118/82   Pulse: 79    Temp: 98.2 °F (36.8 °C)    TempSrc: Oral    SpO2: 94%    Weight: 206 lb 8 oz (93.7 kg)    Height: 5' 1\" (1.549 m)        Physical Exam   Constitutional: She is oriented to person, place, and time. She appears well-developed and well-nourished. No distress. HENT:   Head: Normocephalic and atraumatic. Nose: Nose normal.   Mouth/Throat: No oropharyngeal exudate. Eyes: Conjunctivae and EOM are normal. Pupils are equal, round, and reactive to light. Right eye exhibits no discharge. Left eye exhibits no discharge. Neck: Normal range of motion. Neck supple. Cardiovascular: Normal rate, regular rhythm and normal heart sounds. Exam reveals no gallop and no friction rub. No murmur heard. Pulmonary/Chest: Effort normal and breath sounds normal. No respiratory distress. She has no wheezes. Abdominal: Soft. Bowel sounds are normal. She exhibits no distension. There is no tenderness. There is no rebound. Musculoskeletal: Normal range of motion. She exhibits no edema or tenderness. Neurological: She is alert and oriented to person, place, and time. No cranial nerve deficit. Skin: Skin is warm and dry. No rash noted. She is not diaphoretic. No erythema. Psychiatric: She has a normal mood and affect. Her behavior is normal.   Vitals reviewed.         Assessment:  Timothy Doe is a 56 y.o. female, with a history of asthma, carpal tunnel syndrome in right hand s/p release surgery, seasonal allergies, and diverticulosis, who presents for a follow

## 2018-04-18 ENCOUNTER — OFFICE VISIT (OUTPATIENT)
Dept: ORTHOPEDIC SURGERY | Age: 58
End: 2018-04-18

## 2018-04-18 VITALS
BODY MASS INDEX: 38.89 KG/M2 | WEIGHT: 206 LBS | DIASTOLIC BLOOD PRESSURE: 95 MMHG | HEART RATE: 82 BPM | TEMPERATURE: 97.1 F | HEIGHT: 61 IN | SYSTOLIC BLOOD PRESSURE: 156 MMHG

## 2018-04-18 DIAGNOSIS — M17.0 PRIMARY OSTEOARTHRITIS OF BOTH KNEES: ICD-10-CM

## 2018-04-18 DIAGNOSIS — M25.562 CHRONIC PAIN OF LEFT KNEE: ICD-10-CM

## 2018-04-18 DIAGNOSIS — M25.561 CHRONIC PAIN OF RIGHT KNEE: Primary | ICD-10-CM

## 2018-04-18 DIAGNOSIS — G89.29 CHRONIC PAIN OF LEFT KNEE: ICD-10-CM

## 2018-04-18 DIAGNOSIS — G89.29 CHRONIC PAIN OF RIGHT KNEE: Primary | ICD-10-CM

## 2018-04-18 PROCEDURE — 3014F SCREEN MAMMO DOC REV: CPT | Performed by: PHYSICIAN ASSISTANT

## 2018-04-18 PROCEDURE — 20610 DRAIN/INJ JOINT/BURSA W/O US: CPT | Performed by: PHYSICIAN ASSISTANT

## 2018-04-18 PROCEDURE — G8427 DOCREV CUR MEDS BY ELIG CLIN: HCPCS | Performed by: PHYSICIAN ASSISTANT

## 2018-04-18 PROCEDURE — 3017F COLORECTAL CA SCREEN DOC REV: CPT | Performed by: PHYSICIAN ASSISTANT

## 2018-04-18 PROCEDURE — 1036F TOBACCO NON-USER: CPT | Performed by: PHYSICIAN ASSISTANT

## 2018-04-18 PROCEDURE — 99213 OFFICE O/P EST LOW 20 MIN: CPT | Performed by: PHYSICIAN ASSISTANT

## 2018-04-18 PROCEDURE — G8417 CALC BMI ABV UP PARAM F/U: HCPCS | Performed by: PHYSICIAN ASSISTANT

## 2018-07-12 ENCOUNTER — TELEPHONE (OUTPATIENT)
Dept: ORTHOPEDIC SURGERY | Age: 58
End: 2018-07-12

## 2018-07-12 NOTE — TELEPHONE ENCOUNTER
Patient requesting a refill of Tramadol 50 mg, last filled 3/27/18    [phone]    Patient can be reached at [phone]

## 2018-07-13 ENCOUNTER — TELEPHONE (OUTPATIENT)
Dept: PRIMARY CARE CLINIC | Age: 58
End: 2018-07-13

## 2018-07-13 DIAGNOSIS — M25.561 CHRONIC PAIN OF RIGHT KNEE: Primary | ICD-10-CM

## 2018-07-13 DIAGNOSIS — G89.29 CHRONIC PAIN OF RIGHT KNEE: Primary | ICD-10-CM

## 2018-07-17 NOTE — TELEPHONE ENCOUNTER
Called and spoke with patient to give her Aleta Chau MD response. She declined the phone number and name of doctor.

## 2018-07-17 NOTE — TELEPHONE ENCOUNTER
Pt was given a refill of tramadol for knee pain at her past visit. I told her at that time that she would need to see her orthopedic doctor or a pain doctor for further refills. She can call the pain doctor below to be evaluated.       [address],  [facility] Charleen  [address] [place] [address], [facility] [place] , [facility]  Ph: [phone]

## 2018-07-23 ENCOUNTER — OFFICE VISIT (OUTPATIENT)
Dept: ORTHOPEDIC SURGERY | Age: 58
End: 2018-07-23

## 2018-07-23 VITALS
SYSTOLIC BLOOD PRESSURE: 133 MMHG | RESPIRATION RATE: 16 BRPM | TEMPERATURE: 98.5 F | WEIGHT: 209.6 LBS | BODY MASS INDEX: 39.57 KG/M2 | HEART RATE: 89 BPM | HEIGHT: 61 IN | DIASTOLIC BLOOD PRESSURE: 91 MMHG

## 2018-07-23 DIAGNOSIS — M17.0 PRIMARY OSTEOARTHRITIS OF BOTH KNEES: Primary | ICD-10-CM

## 2018-07-23 PROCEDURE — 20610 DRAIN/INJ JOINT/BURSA W/O US: CPT | Performed by: PHYSICIAN ASSISTANT

## 2018-07-24 PROBLEM — M17.0 PRIMARY OSTEOARTHRITIS OF BOTH KNEES: Status: ACTIVE | Noted: 2018-07-24

## 2018-07-24 NOTE — PROGRESS NOTES
Subjective:      Patient ID: Krupa Giordano is a 66 y.o.  female. Chief Complaint   Patient presents with    Follow-up     Bilat knee pain last injection 4/18/18        HPI: She is here for evaluation and treatment of left and right knee pain related to arthritis. She states the previous injection which was performed 4/18/18 helped relieve the knee symptoms. The bilateral knee pain has gradually returned. There has not been a recent injury. Pain is 7/10. Pain is worse with activity. Pain improves somewhat with rest and elevation. Review of Systems:   Negative for fever or chills. Negative for numbness or tingling around the knee. 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      Social History Main Topics    Smoking status: Former Smoker     Packs/day: 1.50     Years: 9.00     Quit date: 7/7/2009    Smokeless tobacco: Never Used      Comment: quit:  7 years ago    Alcohol use Yes      Comment: rare    Drug use: No    Sexual activity: No       Current Outpatient Prescriptions   Medication Sig Dispense Refill    albuterol sulfate HFA (PROVENTIL HFA) 108 (90 Base) MCG/ACT inhaler Inhale 2 puffs into the lungs every 4 hours as needed for Wheezing or Shortness of Breath (Space out to every 6 hours as symptoms improve) Space out to every 6 hours as symptoms improve.  1 Inhaler 0    hydrochlorothiazide (HYDRODIURIL) 12.5 MG tablet Take 1 tablet by mouth daily 30 tablet 3 the injections fail to control the symptoms then surgical correction should be considered. Risk and benefits of corticosteroid intra-articular injection was discussed today. All questions were answered to her satisfaction. She verbally consented to proceed with intra-articular injection today. Cortisone Injection                                                       PROCEDURE NOTE:     Pre op Diagnosis: Knee pain/ DJD left and right Knee     Post op Diagnosis: Same  With the patient's permission, her left and right knee was prepped  in standard sterile fashion with  Alcohol and 2 cc of 0.25% Marcaine and 1 cc of Kenalog 40 mg was injected into the left and right lateral compartment  without difficulty. The patient tolerated this well without difficulty. A band-aid was applied. The patient was advised to ice the knee for 15-20 minutes to relieve any injection site related pain. Continue with medications as previously prescribed. If diabetic, observe blood sugars for the next 24 to 48 hours. Contact PCP if they remain elevated. HEP instructed for Knee ROM  and Quad strengthening exercises. Activities as tolerated. Follow Up:   Call or return to clinic prn if these symptoms worsen or fail to improve as anticipated.

## 2018-09-27 ENCOUNTER — OFFICE VISIT (OUTPATIENT)
Dept: PRIMARY CARE CLINIC | Age: 58
End: 2018-09-27
Payer: MEDICARE

## 2018-09-27 VITALS
WEIGHT: 204.9 LBS | TEMPERATURE: 98.8 F | SYSTOLIC BLOOD PRESSURE: 136 MMHG | OXYGEN SATURATION: 94 % | HEIGHT: 61 IN | DIASTOLIC BLOOD PRESSURE: 94 MMHG | HEART RATE: 76 BPM | BODY MASS INDEX: 38.68 KG/M2

## 2018-09-27 DIAGNOSIS — R73.03 PREDIABETES: ICD-10-CM

## 2018-09-27 DIAGNOSIS — E66.01 MORBID OBESITY DUE TO EXCESS CALORIES (HCC): ICD-10-CM

## 2018-09-27 DIAGNOSIS — I10 ESSENTIAL HYPERTENSION: ICD-10-CM

## 2018-09-27 DIAGNOSIS — Z12.39 BREAST CANCER SCREENING: Primary | ICD-10-CM

## 2018-09-27 PROCEDURE — 3017F COLORECTAL CA SCREEN DOC REV: CPT | Performed by: INTERNAL MEDICINE

## 2018-09-27 PROCEDURE — G8427 DOCREV CUR MEDS BY ELIG CLIN: HCPCS | Performed by: INTERNAL MEDICINE

## 2018-09-27 PROCEDURE — 1036F TOBACCO NON-USER: CPT | Performed by: INTERNAL MEDICINE

## 2018-09-27 PROCEDURE — 99214 OFFICE O/P EST MOD 30 MIN: CPT | Performed by: INTERNAL MEDICINE

## 2018-09-27 PROCEDURE — G8417 CALC BMI ABV UP PARAM F/U: HCPCS | Performed by: INTERNAL MEDICINE

## 2018-09-27 RX ORDER — HYDROCHLOROTHIAZIDE 25 MG/1
25 TABLET ORAL DAILY
Qty: 30 TABLET | Refills: 5 | Status: SHIPPED | OUTPATIENT
Start: 2018-09-27 | End: 2019-04-02 | Stop reason: SDUPTHER

## 2018-09-27 ASSESSMENT — PATIENT HEALTH QUESTIONNAIRE - PHQ9
1. LITTLE INTEREST OR PLEASURE IN DOING THINGS: 0
SUM OF ALL RESPONSES TO PHQ9 QUESTIONS 1 & 2: 0
SUM OF ALL RESPONSES TO PHQ QUESTIONS 1-9: 0
SUM OF ALL RESPONSES TO PHQ QUESTIONS 1-9: 0
2. FEELING DOWN, DEPRESSED OR HOPELESS: 0

## 2018-09-27 ASSESSMENT — ENCOUNTER SYMPTOMS
NAUSEA: 0
BACK PAIN: 0
DIARRHEA: 0
VOMITING: 0
COUGH: 0
CONSTIPATION: 0
ABDOMINAL PAIN: 0
SHORTNESS OF BREATH: 0

## 2018-09-27 NOTE — PROGRESS NOTES
Chief Complaint   Patient presents with    Hypertension     follow up        Knee Pain     right knee pain  (work injury)          HPI:  Rosie Roland is a 66 y.o. female, with a history of asthma, prediabetes, carpal tunnel syndrome in right hand s/p release surgery, seasonal allergies, and diverticulosis, who presents for a follow up visit     Hypertension: Pt's blood pressure has been up recently. She is currently on HCTZ 12.5 mg daily which she is taking each day. Obesity:  Ms. Flavio Fall is concerned about her weight. She is considering trying a whole body cleanse diet (with multiple pills per day and consuming a charcoal product). The pt purchased the product after seeing it on tv. She is thinking about doing the diet for 20 days. Current Outpatient Prescriptions   Medication Sig Dispense Refill    albuterol sulfate HFA (PROVENTIL HFA) 108 (90 Base) MCG/ACT inhaler Inhale 2 puffs into the lungs every 4 hours as needed for Wheezing or Shortness of Breath (Space out to every 6 hours as symptoms improve) Space out to every 6 hours as symptoms improve. 1 Inhaler 0    hydrochlorothiazide (HYDRODIURIL) 12.5 MG tablet Take 1 tablet by mouth daily 30 tablet 3    BLINK TEARS 0.25 % SOLN PLACE 1 DROP IN EACH EYE 2-4 TIMES D PRN  8    azelastine (OPTIVAR) 0.05 % ophthalmic solution PLACE 1 DROP IN EACH EYE BID PRN  8    orlistat (YING) 60 MG capsule Take 1 capsule by mouth 3 times daily (with meals) 90 capsule 3    fluticasone (FLONASE) 50 MCG/ACT nasal spray 1 spray by Nasal route daily 1 Bottle 3    cetirizine (ZYRTEC ALLERGY) 10 MG tablet Take 1 tablet by mouth daily 30 tablet 3    acetaminophen (TYLENOL) 500 MG tablet Take 1,000 mg by mouth every 4 hours as needed for Pain       No current facility-administered medications for this visit. No Known Allergies    Review of Systems   Constitutional: Negative for chills, fatigue, fever and unexpected weight change.    Eyes: Negative for visual syndrome in right hand s/p release surgery, seasonal allergies, and diverticulosis, who presents for a follow up visit  Plan:       1. Essential hypertension:  Goal BP less than 140/90. Pt's BP is borderline high today and has been for several visits. Will increase HCTZ to 25 mg today. - hydrochlorothiazide (HYDRODIURIL) 25 MG tablet; Take 1 tablet by mouth daily  Dispense: 30 tablet; Refill: 5    2. Morbid obesity due to excess calories ([address]    -discussed risk of performing cleanse diet/untested herbal supplements.    -discussed lifestyle changes including eating better and exercising  - orlistat (YING) 60 MG capsule; Take 1 capsule by mouth 3 times daily (with meals)  Dispense: 90 capsule; Refill: 3    3. Breast cancer screening    - Mammography Screening    4. Prediabetes    - HEMOGLOBIN A1C; Future        Return in about 1 month (around 10/27/2018) for hypertension.

## 2018-09-28 LAB
ESTIMATED AVERAGE GLUCOSE: 137 MG/DL
HBA1C MFR BLD: 6.4 %

## 2018-10-09 DIAGNOSIS — E66.01 MORBID OBESITY DUE TO EXCESS CALORIES (HCC): ICD-10-CM

## 2018-12-11 ENCOUNTER — OFFICE VISIT (OUTPATIENT)
Dept: ORTHOPEDIC SURGERY | Age: 58
End: 2018-12-11
Payer: MEDICARE

## 2018-12-11 VITALS
TEMPERATURE: 96.2 F | HEIGHT: 61 IN | SYSTOLIC BLOOD PRESSURE: 146 MMHG | HEART RATE: 75 BPM | DIASTOLIC BLOOD PRESSURE: 91 MMHG | WEIGHT: 206 LBS | BODY MASS INDEX: 38.89 KG/M2

## 2018-12-11 DIAGNOSIS — M17.0 PRIMARY OSTEOARTHRITIS OF BOTH KNEES: Primary | ICD-10-CM

## 2018-12-11 PROCEDURE — 20610 DRAIN/INJ JOINT/BURSA W/O US: CPT | Performed by: PHYSICIAN ASSISTANT

## 2018-12-11 PROCEDURE — G8417 CALC BMI ABV UP PARAM F/U: HCPCS | Performed by: PHYSICIAN ASSISTANT

## 2018-12-11 PROCEDURE — G8484 FLU IMMUNIZE NO ADMIN: HCPCS | Performed by: PHYSICIAN ASSISTANT

## 2018-12-11 PROCEDURE — 1036F TOBACCO NON-USER: CPT | Performed by: PHYSICIAN ASSISTANT

## 2018-12-11 PROCEDURE — G8427 DOCREV CUR MEDS BY ELIG CLIN: HCPCS | Performed by: PHYSICIAN ASSISTANT

## 2018-12-11 PROCEDURE — 99214 OFFICE O/P EST MOD 30 MIN: CPT | Performed by: PHYSICIAN ASSISTANT

## 2018-12-11 PROCEDURE — 3017F COLORECTAL CA SCREEN DOC REV: CPT | Performed by: PHYSICIAN ASSISTANT

## 2018-12-12 NOTE — PROGRESS NOTES
Kenalog:  NDC: [id number]  Lot Number: [id number]  Expiration Date: 03.2020
Mechanism is  intact. Examination of the right knee shows: The alignment of the knee is neutral.   There is not erythema. There mild soft tissue swelling. There is mild effusion. ROM-  Extension 0          -   Flexion  125   There moderate pain associated with ROM testing. Medial joint line is somewhat tender to palpation. Lateral joint line is somewhat tender to palpation. Retro patellar crepitus is present. There is is not crepitus along the joint line with ROM testing. Varus Stress testing does not produce pain,                                     does not show laxity. Valgus Stress testing does not produce pain,                                       does not show laxity. Lachman's test- negative. Anterior Drawer test- negative. Posterior Drawer test- negative. Dangelo's Test- negative. Patellar Compression testing does produce pain. Extensor Mechanism is  intact. Examination of the lower extremities are intact with sensation to light touch. Motor testing  5/5 in all major motor groups of the lower extremities. Gait is normal heel to toe. Gait is antalgic. Negative Sutton's Sign. SLR negative. Examination of the lower extremities shows intact perfusion to all extremities. No cyanosis. Digits are warm to touch, capillary refill is less than 2 seconds. No edema noted. Examination of the skin over both lower extremities reveals: The skin to be intact without lacerations or abrasions. No significant erythema. No rashes or skin lesions. X Rays: performed in the office today:   AP Standing, Lateral and Sunrise views of left knee: There is moderate osteoarthritic findings of the left knee noted with grade 3 arthritic changes of the medial compartment and patellofemoral compartment. No acute fractures or dislocations noted. AP Standing, Lateral and Sunrise views of right knee:  There is moderate osteoarthritic findings of the right knee noted

## 2019-01-03 ENCOUNTER — TELEPHONE (OUTPATIENT)
Dept: PRIMARY CARE CLINIC | Age: 59
End: 2019-01-03

## 2019-01-03 DIAGNOSIS — R05.8 DRY COUGH: Primary | ICD-10-CM

## 2019-01-03 RX ORDER — BENZONATATE 100 MG/1
100 CAPSULE ORAL 2 TIMES DAILY PRN
Qty: 20 CAPSULE | Refills: 0 | Status: SHIPPED | OUTPATIENT
Start: 2019-01-03 | End: 2019-01-10

## 2019-01-03 RX ORDER — DEXTROMETHORPHAN POLISTIREX 30 MG/5ML
60 SUSPENSION ORAL 2 TIMES DAILY PRN
Qty: 148 BOTTLE | Refills: 0 | Status: SHIPPED | OUTPATIENT
Start: 2019-01-03 | End: 2019-01-13

## 2019-03-13 ENCOUNTER — OFFICE VISIT (OUTPATIENT)
Dept: ORTHOPEDIC SURGERY | Age: 59
End: 2019-03-13
Payer: MEDICARE

## 2019-03-13 VITALS
BODY MASS INDEX: 38.82 KG/M2 | WEIGHT: 205.6 LBS | HEART RATE: 84 BPM | DIASTOLIC BLOOD PRESSURE: 101 MMHG | RESPIRATION RATE: 16 BRPM | TEMPERATURE: 97.8 F | SYSTOLIC BLOOD PRESSURE: 146 MMHG | HEIGHT: 61 IN

## 2019-03-13 DIAGNOSIS — M17.0 PRIMARY OSTEOARTHRITIS OF BOTH KNEES: Primary | ICD-10-CM

## 2019-03-13 PROCEDURE — 99212 OFFICE O/P EST SF 10 MIN: CPT | Performed by: PHYSICIAN ASSISTANT

## 2019-03-13 PROCEDURE — 20610 DRAIN/INJ JOINT/BURSA W/O US: CPT | Performed by: PHYSICIAN ASSISTANT

## 2019-03-22 ENCOUNTER — TELEPHONE (OUTPATIENT)
Dept: PRIMARY CARE CLINIC | Age: 59
End: 2019-03-22

## 2019-04-02 ENCOUNTER — OFFICE VISIT (OUTPATIENT)
Dept: PRIMARY CARE CLINIC | Age: 59
End: 2019-04-02
Payer: MEDICARE

## 2019-04-02 VITALS
WEIGHT: 201.9 LBS | HEIGHT: 61 IN | SYSTOLIC BLOOD PRESSURE: 148 MMHG | BODY MASS INDEX: 38.12 KG/M2 | HEART RATE: 70 BPM | TEMPERATURE: 98 F | RESPIRATION RATE: 12 BRPM | DIASTOLIC BLOOD PRESSURE: 94 MMHG | OXYGEN SATURATION: 97 %

## 2019-04-02 DIAGNOSIS — Z12.11 COLON CANCER SCREENING: ICD-10-CM

## 2019-04-02 DIAGNOSIS — Z00.00 PREVENTATIVE HEALTH CARE: ICD-10-CM

## 2019-04-02 DIAGNOSIS — E66.9 OBESITY (BMI 35.0-39.9 WITHOUT COMORBIDITY): ICD-10-CM

## 2019-04-02 DIAGNOSIS — J30.2 SEASONAL ALLERGIES: ICD-10-CM

## 2019-04-02 DIAGNOSIS — I10 ESSENTIAL HYPERTENSION: Primary | ICD-10-CM

## 2019-04-02 DIAGNOSIS — Z12.39 BREAST CANCER SCREENING: ICD-10-CM

## 2019-04-02 DIAGNOSIS — B34.9 VIRAL INFECTION: ICD-10-CM

## 2019-04-02 PROCEDURE — 99214 OFFICE O/P EST MOD 30 MIN: CPT | Performed by: INTERNAL MEDICINE

## 2019-04-02 PROCEDURE — G8427 DOCREV CUR MEDS BY ELIG CLIN: HCPCS | Performed by: INTERNAL MEDICINE

## 2019-04-02 PROCEDURE — 1036F TOBACCO NON-USER: CPT | Performed by: INTERNAL MEDICINE

## 2019-04-02 PROCEDURE — G8417 CALC BMI ABV UP PARAM F/U: HCPCS | Performed by: INTERNAL MEDICINE

## 2019-04-02 PROCEDURE — 3017F COLORECTAL CA SCREEN DOC REV: CPT | Performed by: INTERNAL MEDICINE

## 2019-04-02 RX ORDER — HYDROCHLOROTHIAZIDE 25 MG/1
25 TABLET ORAL DAILY
Qty: 30 TABLET | Refills: 5 | Status: SHIPPED | OUTPATIENT
Start: 2019-04-02 | End: 2019-04-09

## 2019-04-02 RX ORDER — FLUTICASONE PROPIONATE 50 MCG
1 SPRAY, SUSPENSION (ML) NASAL DAILY
Qty: 1 BOTTLE | Refills: 3 | Status: SHIPPED | OUTPATIENT
Start: 2019-04-02 | End: 2021-06-25 | Stop reason: SDUPTHER

## 2019-04-02 RX ORDER — DEXTROMETHORPHAN POLISTIREX 30 MG/5ML
60 SUSPENSION ORAL 2 TIMES DAILY PRN
Qty: 148 ML | Refills: 0 | Status: SHIPPED | OUTPATIENT
Start: 2019-04-02 | End: 2019-04-12

## 2019-04-02 RX ORDER — AMLODIPINE BESYLATE 5 MG/1
5 TABLET ORAL DAILY
Qty: 30 TABLET | Refills: 3 | Status: SHIPPED | OUTPATIENT
Start: 2019-04-02 | End: 2019-12-12

## 2019-04-02 ASSESSMENT — ENCOUNTER SYMPTOMS
DIARRHEA: 0
SHORTNESS OF BREATH: 0
BACK PAIN: 0
VOMITING: 0
ABDOMINAL PAIN: 0
CONSTIPATION: 0
COUGH: 0
NAUSEA: 0

## 2019-04-02 ASSESSMENT — PATIENT HEALTH QUESTIONNAIRE - PHQ9
SUM OF ALL RESPONSES TO PHQ QUESTIONS 1-9: 0
SUM OF ALL RESPONSES TO PHQ QUESTIONS 1-9: 0
SUM OF ALL RESPONSES TO PHQ9 QUESTIONS 1 & 2: 0
2. FEELING DOWN, DEPRESSED OR HOPELESS: 0
1. LITTLE INTEREST OR PLEASURE IN DOING THINGS: 0

## 2019-04-02 NOTE — PROGRESS NOTES
and shortness of breath. Cardiovascular: Negative for chest pain, palpitations and leg swelling. Gastrointestinal: Negative for abdominal pain, constipation, diarrhea, nausea and vomiting. Musculoskeletal: Negative for arthralgias and back pain. Skin: Negative for rash. Neurological: Negative for dizziness, light-headedness and headaches. Psychiatric/Behavioral: Negative for dysphoric mood. The patient is not nervous/anxious and is not hyperactive. Vitals:    04/02/19 1141 04/02/19 1142   BP: (!) 146/94 (!) 148/94   Pulse: 70    Resp: 12    Temp: 98 °F (36.7 °C)    SpO2: 97%    Weight: 201 lb 14.4 oz (91.6 kg)    Height: 5' 1\" (1.549 m)        Physical Exam   Constitutional: She is oriented to person, place, and time. She appears well-developed and well-nourished. No distress. HENT:   Head: Normocephalic and atraumatic. Nose: Nose normal.   Mouth/Throat: No oropharyngeal exudate. Eyes: Pupils are equal, round, and reactive to light. Conjunctivae and EOM are normal. Right eye exhibits no discharge. Left eye exhibits no discharge. Neck: Normal range of motion. Neck supple. Cardiovascular: Normal rate, regular rhythm and normal heart sounds. Exam reveals no gallop and no friction rub. No murmur heard. Pulmonary/Chest: Effort normal and breath sounds normal. No respiratory distress. She has no wheezes. Abdominal: Soft. Bowel sounds are normal. She exhibits no distension. There is no tenderness. There is no rebound. Musculoskeletal: Normal range of motion. She exhibits no edema or tenderness. Neurological: She is alert and oriented to person, place, and time. She has normal reflexes. No cranial nerve deficit. Skin: Skin is warm and dry. No rash noted. She is not diaphoretic. No erythema. Psychiatric: She has a normal mood and affect. Her behavior is normal.   Vitals reviewed.         Assessment:  Liban Orona is a 66 y.o. female, with a history of asthma, prediabetes, carpal tunnel syndrome in right hand s/p release surgery, seasonal allergies, and diverticulosis, who presents for a follow up visit    Plan:       1. Essential hypertension:  Goal BP less than 140/90. Pt's BP is high today. Will add amlodipine to regimen. - amLODIPine (NORVASC) 5 MG tablet; Take 1 tablet by mouth daily  Dispense: 30 tablet; Refill: 3  - hydrochlorothiazide (HYDRODIURIL) 25 MG tablet; Take 1 tablet by mouth daily  Dispense: 30 tablet; Refill: 5    2. Preventative health care    - CBC Auto Differential; Future  - Hemoglobin A1C; Future  - Hepatic Function Panel; Future  - Renal Function Panel; Future  - TSH with Reflex; Future  - HIV Screen; Future    3. Colon cancer screening    - [facility] Adrienne Perez MD, Gastroenterology, [facility]    4. Breast cancer screening    - ROMAINE DIGITAL SCREEN W OR WO CAD BILATERAL; Future    5. Seasonal allergies    - fluticasone (FLONASE) 50 MCG/ACT nasal spray; 1 spray by Nasal route daily  Dispense: 1 Bottle; Refill: 3    6. Viral infection    - dextromethorphan (DELSYM) 30 MG/5ML extended release liquid; Take 10 mLs by mouth 2 times daily as needed for Cough  Dispense: 148 mL; Refill: 0  - Benzocaine-Menthol (CHLORASEPTIC MAX SORE THROAT) 15-10 MG LOZG; Use every 4 hours as needed for sore throat  Dispense: 15 lozenge; Refill: 2    7. Obesity (BMI 35.0-39.9 without comorbidity)  -recommend making lifestyle changes, including weight loss, exercising 150 minutes weekly, and improving eating habits. - orlistat (YING) 60 MG capsule; Take 1 capsule by mouth 3 times daily (with meals)  Dispense: 90 capsule; Refill: 3        Return in about 1 month (around 5/2/2019) for hypertension.

## 2019-04-02 NOTE — PATIENT INSTRUCTIONS
ibuprofen. Some of these medicines can raise blood pressure. · Learn how to check your blood pressure at home. Lifestyle changes  · Stay at a healthy weight. This is especially important if you put on weight around the waist. Losing even 10 pounds can help you lower your blood pressure. · If your doctor recommends it, get more exercise. Walking is a good choice. Bit by bit, increase the amount you walk every day. Try for at least 30 minutes on most days of the week. You also may want to swim, bike, or do other activities. · Avoid or limit alcohol. Talk to your doctor about whether you can drink any alcohol. · Try to limit how much sodium you eat to less than 2,300 milligrams (mg) a day. Your doctor may ask you to try to eat less than 1,500 mg a day. · Eat plenty of fruits (such as bananas and oranges), vegetables, legumes, whole grains, and low-fat dairy products. · Lower the amount of saturated fat in your diet. Saturated fat is found in animal products such as milk, cheese, and meat. Limiting these foods may help you lose weight and also lower your risk for heart disease. · Do not smoke. Smoking increases your risk for heart attack and stroke. If you need help quitting, talk to your doctor about stop-smoking programs and medicines. These can increase your chances of quitting for good. When should you call for help? Call 911 anytime you think you may need emergency care. This may mean having symptoms that suggest that your blood pressure is causing a serious heart or blood vessel problem. Your blood pressure may be over 180/120.   For example, call 911 if:    · You have symptoms of a heart attack. These may include:  ? Chest pain or pressure, or a strange feeling in the chest.  ? Sweating. ? Shortness of breath. ? Nausea or vomiting. ? Pain, pressure, or a strange feeling in the back, neck, jaw, or upper belly or in one or both shoulders or arms. ? Lightheadedness or sudden weakness.   ? A fast or irregular heartbeat.     · You have symptoms of a stroke. These may include:  ? Sudden numbness, tingling, weakness, or loss of movement in your face, arm, or leg, especially on only one side of your body. ? Sudden vision changes. ? Sudden trouble speaking. ? Sudden confusion or trouble understanding simple statements. ? Sudden problems with walking or balance. ? A sudden, severe headache that is different from past headaches.     · You have severe back or belly pain.    Do not wait until your blood pressure comes down on its own. Get help right away.   Call your doctor now or seek immediate care if:    · Your blood pressure is much higher than normal (such as 180/120 or higher), but you don't have symptoms.     · You think high blood pressure is causing symptoms, such as:  ? Severe headache.  ? Blurry vision.    Watch closely for changes in your health, and be sure to contact your doctor if:    · Your blood pressure measures higher than your doctor recommends at least 2 times. That means the top number is higher or the bottom number is higher, or both.     · You think you may be having side effects from your blood pressure medicine. Where can you learn more? Go to https://iHELP Worldpepiceweb.MeeVee. org and sign in to your [employer] account. Enter E775 in the Verafin box to learn more about \"High Blood Pressure: Care Instructions. \"     If you do not have an account, please click on the \"Sign Up Now\" link. Current as of: July 22, 2018  Content Version: 11.9  © 3648-3289 [employer]. Care instructions adapted under license by [facility] ([facility]). If you have questions about a medical condition or this instruction, always ask your healthcare professional. [place] [address] any warranty or liability for your use of this information.

## 2019-04-04 ENCOUNTER — TELEPHONE (OUTPATIENT)
Dept: PRIMARY CARE CLINIC | Age: 59
End: 2019-04-04

## 2019-04-04 DIAGNOSIS — J40 BRONCHITIS: Primary | ICD-10-CM

## 2019-04-04 RX ORDER — AZITHROMYCIN 250 MG/1
250 TABLET, FILM COATED ORAL SEE ADMIN INSTRUCTIONS
Qty: 6 TABLET | Refills: 0 | Status: SHIPPED | OUTPATIENT
Start: 2019-04-04 | End: 2019-04-09

## 2019-04-04 NOTE — TELEPHONE ENCOUNTER
Given that pt's symptoms have progressed, I have sent a prescription for azithromycin for her to take for possible bronchitis. Please call pt to notify her.

## 2019-04-05 DIAGNOSIS — Z00.00 PREVENTATIVE HEALTH CARE: ICD-10-CM

## 2019-04-05 LAB
ALBUMIN SERPL-MCNC: 4.2 G/DL (ref 3.4–5)
ALP BLD-CCNC: 72 U/L (ref 40–129)
ALT SERPL-CCNC: 15 U/L (ref 10–40)
ANION GAP SERPL CALCULATED.3IONS-SCNC: 10 MMOL/L (ref 3–16)
AST SERPL-CCNC: 11 U/L (ref 15–37)
BASOPHILS ABSOLUTE: 0.1 K/UL (ref 0–0.2)
BASOPHILS RELATIVE PERCENT: 0.7 %
BILIRUB SERPL-MCNC: 0.4 MG/DL (ref 0–1)
BILIRUBIN DIRECT: <0.2 MG/DL (ref 0–0.3)
BILIRUBIN, INDIRECT: ABNORMAL MG/DL (ref 0–1)
BUN BLDV-MCNC: 12 MG/DL (ref 7–20)
CALCIUM SERPL-MCNC: 9.7 MG/DL (ref 8.3–10.6)
CHLORIDE BLD-SCNC: 100 MMOL/L (ref 99–110)
CO2: 32 MMOL/L (ref 21–32)
CREAT SERPL-MCNC: 0.7 MG/DL (ref 0.6–1.1)
EOSINOPHILS ABSOLUTE: 0.1 K/UL (ref 0–0.6)
EOSINOPHILS RELATIVE PERCENT: 0.9 %
GFR AFRICAN AMERICAN: >60
GFR NON-AFRICAN AMERICAN: >60
GLUCOSE BLD-MCNC: 97 MG/DL (ref 70–99)
HCT VFR BLD CALC: 44.6 % (ref 36–48)
HEMOGLOBIN: 14 G/DL (ref 12–16)
LYMPHOCYTES ABSOLUTE: 1.8 K/UL (ref 1–5.1)
LYMPHOCYTES RELATIVE PERCENT: 17 %
MCH RBC QN AUTO: 26.6 PG (ref 26–34)
MCHC RBC AUTO-ENTMCNC: 31.3 G/DL (ref 31–36)
MCV RBC AUTO: 85 FL (ref 80–100)
MONOCYTES ABSOLUTE: 0.6 K/UL (ref 0–1.3)
MONOCYTES RELATIVE PERCENT: 5.5 %
NEUTROPHILS ABSOLUTE: 8 K/UL (ref 1.7–7.7)
NEUTROPHILS RELATIVE PERCENT: 75.9 %
PDW BLD-RTO: 15.8 % (ref 12.4–15.4)
PHOSPHORUS: 2.7 MG/DL (ref 2.5–4.9)
PLATELET # BLD: 229 K/UL (ref 135–450)
PMV BLD AUTO: 9.2 FL (ref 5–10.5)
POTASSIUM SERPL-SCNC: 4.4 MMOL/L (ref 3.5–5.1)
RBC # BLD: 5.25 M/UL (ref 4–5.2)
SODIUM BLD-SCNC: 142 MMOL/L (ref 136–145)
TOTAL PROTEIN: 7.4 G/DL (ref 6.4–8.2)
TSH REFLEX: 1.26 UIU/ML (ref 0.27–4.2)
WBC # BLD: 10.6 K/UL (ref 4–11)

## 2019-04-06 LAB
ESTIMATED AVERAGE GLUCOSE: 139.9 MG/DL
HBA1C MFR BLD: 6.5 %
HIV AG/AB: NORMAL
HIV ANTIGEN: NORMAL
HIV-1 ANTIBODY: NORMAL
HIV-2 AB: NORMAL

## 2019-04-08 DIAGNOSIS — E11.9 TYPE 2 DIABETES MELLITUS WITHOUT COMPLICATION, WITHOUT LONG-TERM CURRENT USE OF INSULIN (HCC): Primary | ICD-10-CM

## 2019-04-08 RX ORDER — ATORVASTATIN CALCIUM 20 MG/1
20 TABLET, FILM COATED ORAL DAILY
Qty: 30 TABLET | Refills: 5 | Status: SHIPPED | OUTPATIENT
Start: 2019-04-08 | End: 2019-12-12 | Stop reason: SDUPTHER

## 2019-04-09 ENCOUNTER — TELEPHONE (OUTPATIENT)
Dept: PRIMARY CARE CLINIC | Age: 59
End: 2019-04-09

## 2019-04-09 DIAGNOSIS — R25.2 MUSCLE CRAMP: Primary | ICD-10-CM

## 2019-04-09 RX ORDER — CHLORTHALIDONE 25 MG/1
25 TABLET ORAL DAILY
Qty: 30 TABLET | Refills: 3 | Status: SHIPPED | OUTPATIENT
Start: 2019-04-09 | End: 2019-12-12 | Stop reason: SDUPTHER

## 2019-04-09 NOTE — TELEPHONE ENCOUNTER
She should stop the medication given side effect. Will order chlorthalidone instead. If cramps continue, pt needs her potassium level checked as low potassium can cause muscle cramps. A potassium level has been ordered. If her cramps do not stop in 1 week, pt should come to the lab to have the test done.

## 2019-04-09 NOTE — TELEPHONE ENCOUNTER
Pt called and stated she is getting muscle cramps from the hydrochlorothiazide (HYDRODIURIL) 25 MG tablet. The Cramps are happening above her ankles in both legs and are very painful. Pt would like to know if there is an alternative to this medication.  Pt uses [facility] [address], [facility] [place] [phone] Gema Hurst [phone]   Please call back at  [phone]

## 2019-04-11 ENCOUNTER — TELEPHONE (OUTPATIENT)
Dept: PRIMARY CARE CLINIC | Age: 59
End: 2019-04-11

## 2019-04-17 PROBLEM — E11.9 TYPE 2 DIABETES MELLITUS WITHOUT COMPLICATION, WITHOUT LONG-TERM CURRENT USE OF INSULIN (HCC): Status: ACTIVE | Noted: 2019-04-17

## 2019-05-02 ENCOUNTER — OFFICE VISIT (OUTPATIENT)
Dept: PRIMARY CARE CLINIC | Age: 59
End: 2019-05-02
Payer: MEDICARE

## 2019-05-02 VITALS
SYSTOLIC BLOOD PRESSURE: 120 MMHG | BODY MASS INDEX: 37.4 KG/M2 | RESPIRATION RATE: 16 BRPM | DIASTOLIC BLOOD PRESSURE: 70 MMHG | HEART RATE: 74 BPM | OXYGEN SATURATION: 94 % | HEIGHT: 61 IN | WEIGHT: 198.1 LBS | TEMPERATURE: 98.2 F

## 2019-05-02 DIAGNOSIS — B35.3 TINEA PEDIS OF LEFT FOOT: ICD-10-CM

## 2019-05-02 DIAGNOSIS — Z78.9 HEPATITIS B VACCINATION STATUS UNKNOWN: ICD-10-CM

## 2019-05-02 DIAGNOSIS — E11.9 TYPE 2 DIABETES MELLITUS WITHOUT COMPLICATION, WITHOUT LONG-TERM CURRENT USE OF INSULIN (HCC): Primary | ICD-10-CM

## 2019-05-02 DIAGNOSIS — E11.9 TYPE 2 DIABETES MELLITUS WITHOUT COMPLICATION, WITHOUT LONG-TERM CURRENT USE OF INSULIN (HCC): ICD-10-CM

## 2019-05-02 LAB
CREATININE URINE: 168.2 MG/DL (ref 28–259)
HBV SURFACE AB TITR SER: <3.5 MIU/ML
HEPATITIS B SURFACE ANTIGEN INTERPRETATION: NORMAL
MICROALBUMIN UR-MCNC: <1.2 MG/DL
MICROALBUMIN/CREAT UR-RTO: NORMAL MG/G (ref 0–30)

## 2019-05-02 PROCEDURE — 3044F HG A1C LEVEL LT 7.0%: CPT | Performed by: INTERNAL MEDICINE

## 2019-05-02 PROCEDURE — 2022F DILAT RTA XM EVC RTNOPTHY: CPT | Performed by: INTERNAL MEDICINE

## 2019-05-02 PROCEDURE — G8427 DOCREV CUR MEDS BY ELIG CLIN: HCPCS | Performed by: INTERNAL MEDICINE

## 2019-05-02 PROCEDURE — 99213 OFFICE O/P EST LOW 20 MIN: CPT | Performed by: INTERNAL MEDICINE

## 2019-05-02 PROCEDURE — G8417 CALC BMI ABV UP PARAM F/U: HCPCS | Performed by: INTERNAL MEDICINE

## 2019-05-02 PROCEDURE — 3017F COLORECTAL CA SCREEN DOC REV: CPT | Performed by: INTERNAL MEDICINE

## 2019-05-02 PROCEDURE — 1036F TOBACCO NON-USER: CPT | Performed by: INTERNAL MEDICINE

## 2019-05-02 RX ORDER — ASPIRIN 81 MG/1
81 TABLET ORAL DAILY
Qty: 30 TABLET | Refills: 5 | Status: SHIPPED | OUTPATIENT
Start: 2019-05-02 | End: 2019-12-12 | Stop reason: SDUPTHER

## 2019-05-02 RX ORDER — CLOTRIMAZOLE 1 %
CREAM (GRAM) TOPICAL
Qty: 60 G | Refills: 1 | Status: SHIPPED | OUTPATIENT
Start: 2019-05-02 | End: 2019-05-09

## 2019-05-02 ASSESSMENT — ENCOUNTER SYMPTOMS
SHORTNESS OF BREATH: 0
BACK PAIN: 0
DIARRHEA: 0
NAUSEA: 0
ABDOMINAL PAIN: 0
VOMITING: 0
COUGH: 0
CONSTIPATION: 0

## 2019-05-02 NOTE — PROGRESS NOTES
Chief Complaint   Patient presents with    Hypertension         HPI:  Eduard Chamberlain mauricio 66 y.o. female, with a history of asthma, type 2 diabetes, carpal tunnel syndrome in right hand s/p release surgery, seasonal allergies, and diverticulosis, who presents for a follow up visit    Hypertension: Pt here for hypertension follow up. Pt is taking chlorthalidone and amlodipine for blood pressure. Pt is watching her salt intake closely. Pt not exercising regulary. Through changing her diet, the pt has lost 3 lbs since her last visit. Type 2 diabetes:  Ms. Marina Moncada blood work showed she is a new onset diabetic. Ms. Mayorga was placed on metformin for treatment. Current Outpatient Medications   Medication Sig Dispense Refill    chlorthalidone (HYGROTON) 25 MG tablet Take 1 tablet by mouth daily 30 tablet 3    atorvastatin (LIPITOR) 20 MG tablet Take 1 tablet by mouth daily 30 tablet 5    metFORMIN (GLUCOPHAGE) 500 MG tablet Take 1 tablet by mouth 2 times daily (with meals) 60 tablet 5    amLODIPine (NORVASC) 5 MG tablet Take 1 tablet by mouth daily 30 tablet 3    orlistat (YING) 60 MG capsule Take 1 capsule by mouth 3 times daily (with meals) 90 capsule 3    fluticasone (FLONASE) 50 MCG/ACT nasal spray 1 spray by Nasal route daily 1 Bottle 3    Benzocaine-Menthol (CHLORASEPTIC MAX SORE THROAT) 15-10 MG LOZG Use every 4 hours as needed for sore throat 15 lozenge 2    BLINK TEARS 0.25 % SOLN PLACE 1 DROP IN EACH EYE 2-4 TIMES D PRN  8    azelastine (OPTIVAR) 0.05 % ophthalmic solution PLACE 1 DROP IN EACH EYE BID PRN  8    acetaminophen (TYLENOL) 500 MG tablet Take 1,000 mg by mouth every 4 hours as needed for Pain       No current facility-administered medications for this visit. No Known Allergies    Review of Systems   Constitutional: Negative for chills, fatigue and fever. Eyes: Negative for visual disturbance. Respiratory: Negative for cough and shortness of breath. Cardiovascular: Negative for chest pain, palpitations and leg swelling. Gastrointestinal: Negative for abdominal pain, constipation, diarrhea, nausea and vomiting. Musculoskeletal: Negative for arthralgias and back pain. Skin: Negative for rash. Neurological: Negative for dizziness, light-headedness, numbness and headaches. Psychiatric/Behavioral: Negative for dysphoric mood. The patient is not nervous/anxious. Vitals:    05/02/19 1050 05/02/19 1051 05/02/19 1112   BP: (!) 144/94 (!) 134/90 120/70   Pulse: 74     Resp: 16     Temp: 98.2 °F (36.8 °C)     SpO2: 94%     Weight: 198 lb 1.6 oz (89.9 kg)     Height: 5' 1\" (1.549 m)         Physical Exam   Constitutional: She is oriented to person, place, and time. She appears well-developed and well-nourished. No distress. HENT:   Head: Normocephalic and atraumatic. Nose: Nose normal.   Mouth/Throat: No oropharyngeal exudate. Eyes: Pupils are equal, round, and reactive to light. Conjunctivae and EOM are normal. Right eye exhibits no discharge. Left eye exhibits no discharge. Neck: Normal range of motion. Neck supple. Cardiovascular: Normal rate, regular rhythm and normal heart sounds. Exam reveals no gallop and no friction rub. No murmur heard. Pulmonary/Chest: Effort normal and breath sounds normal. No respiratory distress. She has no wheezes. Abdominal: Soft. Bowel sounds are normal. She exhibits no distension. There is no tenderness. There is no rebound. Musculoskeletal: Normal range of motion. She exhibits no edema or tenderness. Lymphadenopathy:     She has no cervical adenopathy. Neurological: She is alert and oriented to person, place, and time. She has normal reflexes. No cranial nerve deficit. Diabetic foot examination:  Monofilament testing normal bilaterally. No wounds, sores, present. Pt has tinea pedis in between toes on left foot. Normal dorsal pedis pulses bilaterally      Skin: Skin is warm and dry.  No rash noted. She is not diaphoretic. No erythema. Psychiatric: She has a normal mood and affect. Her behavior is normal.   Vitals reviewed. Assessment:  Marija Rapp is a 66 y.o. female, with a history of asthma, type 2 diabetes, carpal tunnel syndrome in right hand s/p release surgery, seasonal allergies, and diverticulosis, who presents for a follow up visit      Plan:       1. Type 2 diabetes mellitus without complication, without long-term current use of insulin ([address]):  Goal A1c less than 7. Pt's most recent A1c was 6.5 showing good control.     -continue current dose of metformin   - Diabetic Foot Exam  - MICROALBUMIN / CREATININE URINE RATIO; Future  - [facility] Gia Longoria, [place], [place], [facility]  - aspirin EC 81 MG EC tablet; Take 1 tablet by mouth daily  Dispense: 30 tablet; Refill: 5    2. Hepatitis B vaccination status unknown    - HEPATITIS B SURFACE ANTIGEN; Future  - HEPATITIS B SURFACE ANTIBODY; Future    3. Tinea pedis of left foot    - clotrimazole (LOTRIMIN AF) 1 % cream; Apply topically 2 times daily. Dispense: 60 g; Refill: 1    4. Hypertension:  Goal BP less than 140/90.   Pt's BP is well controlled today.    -continue current dose of amlodipine and chlorthalidone       Return in about 3 months (around 8/2/2019) for diabetes eval.

## 2019-05-02 NOTE — PATIENT INSTRUCTIONS
Goal hemoglobin A1c less than 7. Patient Education        Diabetes Foot Health: Care Instructions  Your Care Instructions    When you have diabetes, your feet need extra care and attention. Diabetes can damage the nerve endings and blood vessels in your feet, making you less likely to notice when your feet are injured. Diabetes also limits your body's ability to fight infection and get blood to areas that need it. If you get a minor foot injury, it could become an ulcer or a serious infection. With good foot care, you can prevent most of these problems. Caring for your feet can be quick and easy. Most of the care can be done when you are bathing or getting ready for bed. Follow-up care is a key part of your treatment and safety. Be sure to make and go to all appointments, and call your doctor if you are having problems. It's also a good idea to know your test results and keep a list of the medicines you take. How can you care for yourself at home? · Keep your blood sugar close to normal by watching what and how much you eat, monitoring blood sugar, taking medicines if prescribed, and getting regular exercise. · Do not smoke. Smoking affects blood flow and can make foot problems worse. If you need help quitting, talk to your doctor about stop-smoking programs and medicines. These can increase your chances of quitting for good. · Eat a diet that is low in fats. High fat intake can cause fat to build up in your blood vessels and decrease blood flow. · Inspect your feet daily for blisters, cuts, cracks, or sores. If you cannot see well, use a mirror or have someone help you. · Take care of your feet:  ? Wash your feet every day. Use warm (not hot) water. Check the water temperature with your wrists or other part of your body, not your feet. ? Dry your feet well. Pat them dry. Do not rub the skin on your feet too hard. Dry well between your toes.  If the skin on your feet stays moist, bacteria or a fungus can grow, which can lead to infection. ? Keep your skin soft. Use moisturizing skin cream to keep the skin on your feet soft and prevent calluses and cracks. But do not put the cream between your toes, and stop using any cream that causes a rash. ? Clean underneath your toenails carefully. Do not use a sharp object to clean underneath your toenails. Use the blunt end of a nail file or other rounded tool. ? Trim and file your toenails straight across to prevent ingrown toenails. Use a nail clipper, not scissors. Use an emery board to smooth the edges. · Change socks daily. Socks without seams are best, because seams often rub the feet. You can find socks for people with diabetes from specialty catalogs. · Look inside your shoes every day for things like gravel or torn linings, which could cause blisters or sores. · Buy shoes that fit well:  ? Look for shoes that have plenty of space around the toes. This helps prevent bunions and blisters. ? Try on shoes while wearing the kind of socks you will usually wear with the shoes. ? Avoid plastic shoes. They may rub your feet and cause blisters. Good shoes should be made of materials that are flexible and breathable, such as leather or cloth. ? Break in new shoes slowly by wearing them for no more than an hour a day for several days. Take extra time to check your feet for red areas, blisters, or other problems after you wear new shoes. · Do not go barefoot. Do not wear sandals, and do not wear shoes with very thin soles. Thin soles are easy to puncture. They also do not protect your feet from hot pavement or cold weather. · Have your doctor check your feet during each visit. If you have a foot problem, see your doctor. Do not try to treat an early foot problem at home. Home remedies or treatments that you can buy without a prescription (such as corn removers) can be harmful. · Always get early treatment for foot problems.  A minor irritation can lead to a major problem if not properly cared for early. When should you call for help? Call your doctor now or seek immediate medical care if:    · You have a foot sore, an ulcer or break in the skin that is not healing after 4 days, bleeding corns or calluses, or an ingrown toenail.     · You have blue or black areas, which can mean bruising or blood flow problems.     · You have peeling skin or tiny blisters between your toes or cracking or oozing of the skin.     · You have a fever for more than 24 hours and a foot sore.     · You have new numbness or tingling in your feet that does not go away after you move your feet or change positions.     · You have unexplained or unusual swelling of the foot or ankle.    Watch closely for changes in your health, and be sure to contact your doctor if:    · You cannot do proper foot care. Where can you learn more? Go to https://Buzzoo.Yashi. org and sign in to your [employer] account. Enter A739 in the [employer] box to learn more about \"Diabetes Foot Health: Care Instructions. \"     If you do not have an account, please click on the \"Sign Up Now\" link. Current as of: July 25, 2018  Content Version: 11.9  © 3682-0927 [employer]. Care instructions adapted under license by [facility] [address] If you have questions about a medical condition or this instruction, always ask your healthcare professional. [place] [address] any warranty or liability for your use of this information. Patient Education        Athlete's Foot: Care Instructions  Your Care Instructions    Athlete's foot is an itchy rash on the foot caused by an infection with a fungus. You can get it by going barefoot in wet public areas, such as swimming pools or locker rooms. Many times there is no clear reason why you get athlete's foot. You can easily treat athlete's foot by putting medicine on your feet for 1 to 6 weeks.  In some cases, a doctor may prescribe pills to kill the fungus. Follow-up care is a key part of your treatment and safety. Be sure to make and go to all appointments, and call your doctor if you are having problems. It's also a good idea to know your test results and keep a list of the medicines you take. How can you care for yourself at home? · Your doctor may suggest an over-the counter lotion or spray or may prescribe a medicine. Take your medicines exactly as prescribed. Call your doctor if you think you are having a problem with your medicine. · Keep your feet clean and dry. · When you get dressed, put your socks on before your underwear. This can prevent the fungus from spreading from your feet to your groin. To prevent athlete's foot  · Wear flip-flops or other shower sandals in public locker rooms and showers and by the pool. · Dry between your toes after swimming or bathing. · Wear leather shoes or sandals, which let air get to your feet. · Change your socks as needed so your feet stay as dry as possible. · Use antifungal powder on your feet. When should you call for help? Watch closely for changes in your health, and be sure to contact your doctor if:    · You do not get better as expected. Where can you learn more? Go to https://Brentwood Investmentspepiceweb.Xiu.com. org and sign in to your [employer] account. Enter M498 in the [facility] box to learn more about \"Athlete's Foot: Care Instructions. \"     If you do not have an account, please click on the \"Sign Up Now\" link. Current as of: April 17, 2018  Content Version: 11.9  © 3117-0827 [employer]. Care instructions adapted under license by [facility] ([facility]). If you have questions about a medical condition or this instruction, always ask your healthcare professional. [place] [address] any warranty or liability for your use of this information.

## 2019-05-08 ENCOUNTER — NURSE ONLY (OUTPATIENT)
Dept: PRIMARY CARE CLINIC | Age: 59
End: 2019-05-08
Payer: MEDICARE

## 2019-05-08 DIAGNOSIS — Z23 IMMUNIZATION DUE: ICD-10-CM

## 2019-05-08 PROCEDURE — G0010 ADMIN HEPATITIS B VACCINE: HCPCS | Performed by: INTERNAL MEDICINE

## 2019-05-08 PROCEDURE — 90746 HEPB VACCINE 3 DOSE ADULT IM: CPT | Performed by: INTERNAL MEDICINE

## 2019-05-21 ENCOUNTER — OFFICE VISIT (OUTPATIENT)
Dept: GYNECOLOGY | Age: 59
End: 2019-05-21
Payer: MEDICARE

## 2019-05-21 VITALS
DIASTOLIC BLOOD PRESSURE: 88 MMHG | WEIGHT: 197.8 LBS | SYSTOLIC BLOOD PRESSURE: 128 MMHG | BODY MASS INDEX: 37.34 KG/M2 | HEIGHT: 61 IN

## 2019-05-21 DIAGNOSIS — Z01.419 WELL WOMAN EXAM WITH ROUTINE GYNECOLOGICAL EXAM: ICD-10-CM

## 2019-05-21 DIAGNOSIS — Z12.31 ENCOUNTER FOR SCREENING MAMMOGRAM FOR MALIGNANT NEOPLASM OF BREAST: ICD-10-CM

## 2019-05-21 PROCEDURE — G0101 CA SCREEN;PELVIC/BREAST EXAM: HCPCS | Performed by: OBSTETRICS & GYNECOLOGY

## 2019-05-27 ASSESSMENT — ENCOUNTER SYMPTOMS
GASTROINTESTINAL NEGATIVE: 1
EYES NEGATIVE: 1
RESPIRATORY NEGATIVE: 1

## 2019-05-28 NOTE — PROGRESS NOTES
Subjective:      Patient ID: Liban Orona is a 66 y.o. female. Patient is here for annual. Patient in menopause. Gynecologic Exam         Review of Systems   Constitutional: Negative. HENT: Negative. Eyes: Negative. Respiratory: Negative. Cardiovascular: Negative. Gastrointestinal: Negative. Genitourinary: Negative. Musculoskeletal: Negative. Skin: Negative. Neurological: Negative. Psychiatric/Behavioral: Negative.       Date of Birth 1960  Past Medical History:   Diagnosis Date    Allergic rhinitis     Arthritis     Asthma     not currently medically treated    Essential hypertension 2018    Hyperlipidemia     Neuropathy     Osteoarthritis     Type 2 diabetes mellitus without complication, without long-term current use of insulin ([facility] [address] 2019    Wears glasses      Past Surgical History:   Procedure Laterality Date    CARPAL TUNNEL RELEASE Right 7-10-14    FOOT SURGERY Right     rods placed     OB History    Para Term  AB Living   6 6 6     6   SAB TAB Ectopic Molar Multiple Live Births           1        # Outcome Date GA Lbr Lior/2nd Weight Sex Delivery Anes PTL Lv   6 Term 1994 37w0d   M Vag-Spont      5 Term 12 37w0d   M Vag-Spont      4 Term 46 37w0d   F Vag-Spont      3 Term 12 37w0d   M Vag-Spont      2 Term 26 37w0d   F Vag-Spont      1 Term 36 37w0d   M Vag-Spont        Social History     Socioeconomic History    Marital status: Single     Spouse name: Not on file    Number of children: 10    Years of education: Not on file    Highest education level: Not on file   Occupational History    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 48     Last attempt to quit: 2009     Years since quittin.8    Smokeless tobacco: Never Used    Tobacco comment: quit:  7 years ago   Substance and Sexual Activity    Alcohol use: Yes     Comment: rare    Drug use: No    Sexual activity: Never   Lifestyle    Physical activity:     Days per week: Not on file     Minutes per session: Not on file    Stress: Not on file   Relationships    Social connections:     Talks on phone: Not on file     Gets together: Not on file     Attends Anglican service: Not on file     Active member of club or organization: Not on file     Attends meetings of clubs or organizations: Not on file     Relationship status: Not on file    Intimate partner violence:     Fear of current or ex partner: Not on file     Emotionally abused: Not on file     Physically abused: Not on file     Forced sexual activity: Not on file   Other Topics Concern    Not on file   Social History Narrative    Not on file     No Known Allergies  Outpatient Medications Marked as Taking for the 19 encounter (Office Visit) with Suyapa Clemens MD   Medication Sig Dispense Refill    aspirin EC 81 MG EC tablet Take 1 tablet by mouth daily 30 tablet 5    chlorthalidone (HYGROTON) 25 MG tablet Take 1 tablet by mouth daily 30 tablet 3    atorvastatin (LIPITOR) 20 MG tablet Take 1 tablet by mouth daily 30 tablet 5    metFORMIN (GLUCOPHAGE) 500 MG tablet Take 1 tablet by mouth 2 times daily (with meals) 60 tablet 5    amLODIPine (NORVASC) 5 MG tablet Take 1 tablet by mouth daily 30 tablet 3    orlistat (YING) 60 MG capsule Take 1 capsule by mouth 3 times daily (with meals) 90 capsule 3    fluticasone (FLONASE) 50 MCG/ACT nasal spray 1 spray by Nasal route daily 1 Bottle 3    Benzocaine-Menthol (CHLORASEPTIC MAX SORE THROAT) 15-10 MG LOZG Use every 4 hours as needed for sore throat 15 lozenge 2    BLINK TEARS 0.25 % SOLN PLACE 1 DROP IN EACH EYE 2-4 TIMES D PRN  8    azelastine (OPTIVAR) tenderness or bleeding in the vagina. No foreign body in the vagina. No signs of injury around the vagina. No vaginal discharge found. Genitourinary Comments: Normal urethral meatus, nl urethra, nl bladder. Musculoskeletal: Normal range of motion. She exhibits no edema or tenderness. Lymphadenopathy:     She has no cervical adenopathy. Right: No inguinal adenopathy present. Left: No inguinal adenopathy present. Neurological: She is alert and oriented to person, place, and time. She has normal reflexes. Skin: Skin is warm and dry. No rash noted. She is not diaphoretic. No erythema. Psychiatric: She has a normal mood and affect. Her behavior is normal. Judgment and thought content normal.       Assessment:      1. Annual  2. Menopause      Plan:      1.  Pap, calcium, exercise, mammogram  2. stable        Clint Zaragoza MD

## 2019-06-06 ENCOUNTER — NURSE ONLY (OUTPATIENT)
Dept: PRIMARY CARE CLINIC | Age: 59
End: 2019-06-06
Payer: MEDICARE

## 2019-06-06 DIAGNOSIS — Z23 IMMUNIZATION DUE: Primary | ICD-10-CM

## 2019-06-06 PROCEDURE — G0010 ADMIN HEPATITIS B VACCINE: HCPCS | Performed by: INTERNAL MEDICINE

## 2019-06-06 PROCEDURE — 90746 HEPB VACCINE 3 DOSE ADULT IM: CPT | Performed by: INTERNAL MEDICINE

## 2019-06-06 NOTE — PATIENT INSTRUCTIONS
After obtaining consent, and per orders of Dr. José Manuel Sims, injection of Heb B  given in Right arm by Macie Price. Patient instructed to remain in clinic for 20 minutes afterwards, and to report any adverse reaction to me immediately.

## 2019-07-23 ENCOUNTER — OFFICE VISIT (OUTPATIENT)
Dept: ORTHOPEDIC SURGERY | Age: 59
End: 2019-07-23
Payer: MEDICARE

## 2019-07-23 VITALS
HEIGHT: 61 IN | WEIGHT: 198 LBS | SYSTOLIC BLOOD PRESSURE: 126 MMHG | DIASTOLIC BLOOD PRESSURE: 91 MMHG | HEART RATE: 78 BPM | TEMPERATURE: 97.2 F | BODY MASS INDEX: 37.38 KG/M2

## 2019-07-23 DIAGNOSIS — M17.0 PRIMARY OSTEOARTHRITIS OF BOTH KNEES: Primary | ICD-10-CM

## 2019-07-23 PROCEDURE — 20610 DRAIN/INJ JOINT/BURSA W/O US: CPT | Performed by: PHYSICIAN ASSISTANT

## 2019-07-29 ENCOUNTER — TELEPHONE (OUTPATIENT)
Dept: PRIMARY CARE CLINIC | Age: 59
End: 2019-07-29

## 2019-07-29 DIAGNOSIS — R05.9 COUGH: Primary | ICD-10-CM

## 2019-07-29 RX ORDER — DEXTROMETHORPHAN POLISTIREX 30 MG/5ML
60 SUSPENSION ORAL 2 TIMES DAILY PRN
Qty: 148 ML | Refills: 0 | Status: SHIPPED | OUTPATIENT
Start: 2019-07-29 | End: 2019-08-08

## 2019-07-29 NOTE — TELEPHONE ENCOUNTER
I will send in delsym cough syrup for her. We do not have the kroger brand cough syrup in our ordering system. Also given that pt has medicare, they may not pay for any over the counter medications. She may have to buy this medication out of pocket. Please call pt to notify her.

## 2019-08-02 ENCOUNTER — TELEPHONE (OUTPATIENT)
Dept: PRIMARY CARE CLINIC | Age: 59
End: 2019-08-02

## 2019-10-04 ENCOUNTER — HOSPITAL ENCOUNTER (OUTPATIENT)
Dept: WOMENS IMAGING | Age: 59
Discharge: HOME | End: 2019-10-04
Payer: MEDICARE

## 2019-10-04 DIAGNOSIS — Z12.31 ENCOUNTER FOR SCREENING MAMMOGRAM FOR MALIGNANT NEOPLASM OF BREAST: ICD-10-CM

## 2019-10-04 DIAGNOSIS — Z01.419 WELL WOMAN EXAM WITH ROUTINE GYNECOLOGICAL EXAM: ICD-10-CM

## 2019-10-04 PROCEDURE — 77063 BREAST TOMOSYNTHESIS BI: CPT

## 2019-10-24 ENCOUNTER — OFFICE VISIT (OUTPATIENT)
Dept: ORTHOPEDIC SURGERY | Age: 59
End: 2019-10-24
Payer: MEDICARE

## 2019-10-24 VITALS
DIASTOLIC BLOOD PRESSURE: 100 MMHG | TEMPERATURE: 96.8 F | HEART RATE: 82 BPM | SYSTOLIC BLOOD PRESSURE: 149 MMHG | BODY MASS INDEX: 37.49 KG/M2 | WEIGHT: 198.4 LBS

## 2019-10-24 DIAGNOSIS — M17.0 PRIMARY OSTEOARTHRITIS OF BOTH KNEES: Primary | ICD-10-CM

## 2019-10-24 PROCEDURE — 20610 DRAIN/INJ JOINT/BURSA W/O US: CPT | Performed by: PHYSICIAN ASSISTANT

## 2019-11-19 ENCOUNTER — OFFICE VISIT (OUTPATIENT)
Dept: ORTHOPEDIC SURGERY | Age: 59
End: 2019-11-19
Payer: MEDICARE

## 2019-11-19 VITALS
HEART RATE: 62 BPM | SYSTOLIC BLOOD PRESSURE: 144 MMHG | HEIGHT: 61 IN | DIASTOLIC BLOOD PRESSURE: 95 MMHG | WEIGHT: 192.2 LBS | BODY MASS INDEX: 36.29 KG/M2 | TEMPERATURE: 97.3 F

## 2019-11-19 DIAGNOSIS — M17.0 PRIMARY OSTEOARTHRITIS OF BOTH KNEES: Primary | ICD-10-CM

## 2019-11-19 PROCEDURE — G8417 CALC BMI ABV UP PARAM F/U: HCPCS | Performed by: PHYSICIAN ASSISTANT

## 2019-11-19 PROCEDURE — 3017F COLORECTAL CA SCREEN DOC REV: CPT | Performed by: PHYSICIAN ASSISTANT

## 2019-11-19 PROCEDURE — G8484 FLU IMMUNIZE NO ADMIN: HCPCS | Performed by: PHYSICIAN ASSISTANT

## 2019-11-19 PROCEDURE — 99214 OFFICE O/P EST MOD 30 MIN: CPT | Performed by: PHYSICIAN ASSISTANT

## 2019-11-19 PROCEDURE — G8427 DOCREV CUR MEDS BY ELIG CLIN: HCPCS | Performed by: PHYSICIAN ASSISTANT

## 2019-11-19 PROCEDURE — 1036F TOBACCO NON-USER: CPT | Performed by: PHYSICIAN ASSISTANT

## 2019-12-09 ENCOUNTER — HOSPITAL ENCOUNTER (OUTPATIENT)
Age: 59
Discharge: HOME | End: 2019-12-09
Payer: MEDICARE

## 2019-12-09 ENCOUNTER — OFFICE VISIT (OUTPATIENT)
Dept: ORTHOPEDIC SURGERY | Age: 59
End: 2019-12-09
Payer: MEDICARE

## 2019-12-09 VITALS
BODY MASS INDEX: 36.44 KG/M2 | TEMPERATURE: 97 F | DIASTOLIC BLOOD PRESSURE: 87 MMHG | HEART RATE: 71 BPM | SYSTOLIC BLOOD PRESSURE: 155 MMHG | WEIGHT: 193 LBS | HEIGHT: 61 IN

## 2019-12-09 DIAGNOSIS — M17.11 PRIMARY OSTEOARTHRITIS OF RIGHT KNEE: ICD-10-CM

## 2019-12-09 DIAGNOSIS — M17.0 PRIMARY OSTEOARTHRITIS OF BOTH KNEES: Primary | ICD-10-CM

## 2019-12-09 LAB
ALBUMIN SERPL-MCNC: 4.1 G/DL (ref 3.4–5)
ANION GAP SERPL CALCULATED.3IONS-SCNC: 11 MMOL/L (ref 3–16)
APTT: 35.2 SEC (ref 24.2–36.2)
BACTERIA: ABNORMAL /HPF
BASOPHILS ABSOLUTE: 0.1 K/UL (ref 0–0.2)
BASOPHILS RELATIVE PERCENT: 0.9 %
BILIRUBIN URINE: NEGATIVE
BLOOD, URINE: NEGATIVE
BUN BLDV-MCNC: 9 MG/DL (ref 7–20)
CALCIUM SERPL-MCNC: 9.7 MG/DL (ref 8.3–10.6)
CHLORIDE BLD-SCNC: 102 MMOL/L (ref 99–110)
CLARITY: ABNORMAL
CO2: 28 MMOL/L (ref 21–32)
COLOR: YELLOW
CREAT SERPL-MCNC: 0.8 MG/DL (ref 0.6–1.1)
EOSINOPHILS ABSOLUTE: 0.1 K/UL (ref 0–0.6)
EOSINOPHILS RELATIVE PERCENT: 1.5 %
EPITHELIAL CELLS, UA: 10 /HPF (ref 0–5)
GFR AFRICAN AMERICAN: >60
GFR NON-AFRICAN AMERICAN: >60
GLUCOSE BLD-MCNC: 104 MG/DL (ref 70–99)
GLUCOSE URINE: NEGATIVE MG/DL
HCT VFR BLD CALC: 41.9 % (ref 36–48)
HEMOGLOBIN: 13.3 G/DL (ref 12–16)
INR BLD: 0.94 (ref 0.86–1.14)
KETONES, URINE: NEGATIVE MG/DL
LEUKOCYTE ESTERASE, URINE: ABNORMAL
LYMPHOCYTES ABSOLUTE: 1.3 K/UL (ref 1–5.1)
LYMPHOCYTES RELATIVE PERCENT: 15 %
MCH RBC QN AUTO: 27.1 PG (ref 26–34)
MCHC RBC AUTO-ENTMCNC: 31.8 G/DL (ref 31–36)
MCV RBC AUTO: 85 FL (ref 80–100)
MICROSCOPIC EXAMINATION: YES
MONOCYTES ABSOLUTE: 0.4 K/UL (ref 0–1.3)
MONOCYTES RELATIVE PERCENT: 4.7 %
NEUTROPHILS ABSOLUTE: 6.8 K/UL (ref 1.7–7.7)
NEUTROPHILS RELATIVE PERCENT: 77.9 %
NITRITE, URINE: NEGATIVE
PDW BLD-RTO: 16.3 % (ref 12.4–15.4)
PH UA: 5.5 (ref 5–8)
PLATELET # BLD: 245 K/UL (ref 135–450)
PMV BLD AUTO: 9.7 FL (ref 5–10.5)
POTASSIUM SERPL-SCNC: 4.3 MMOL/L (ref 3.5–5.1)
PROTEIN UA: NEGATIVE MG/DL
PROTHROMBIN TIME: 10.9 SEC (ref 10–13.2)
RBC # BLD: 4.93 M/UL (ref 4–5.2)
RBC UA: 3 /HPF (ref 0–4)
SODIUM BLD-SCNC: 141 MMOL/L (ref 136–145)
SPECIFIC GRAVITY UA: 1.02 (ref 1–1.03)
TRANSFERRIN: 226 MG/DL (ref 200–360)
URINE REFLEX TO CULTURE: YES
URINE TYPE: ABNORMAL
UROBILINOGEN, URINE: 0.2 E.U./DL
VITAMIN D 25-HYDROXY: 12.3 NG/ML
WBC # BLD: 8.7 K/UL (ref 4–11)
WBC UA: 3 /HPF (ref 0–5)

## 2019-12-09 PROCEDURE — 80048 BASIC METABOLIC PNL TOTAL CA: CPT

## 2019-12-09 PROCEDURE — 82040 ASSAY OF SERUM ALBUMIN: CPT

## 2019-12-09 PROCEDURE — 85610 PROTHROMBIN TIME: CPT

## 2019-12-09 PROCEDURE — G8417 CALC BMI ABV UP PARAM F/U: HCPCS | Performed by: ORTHOPAEDIC SURGERY

## 2019-12-09 PROCEDURE — 84466 ASSAY OF TRANSFERRIN: CPT

## 2019-12-09 PROCEDURE — G8427 DOCREV CUR MEDS BY ELIG CLIN: HCPCS | Performed by: ORTHOPAEDIC SURGERY

## 2019-12-09 PROCEDURE — 83036 HEMOGLOBIN GLYCOSYLATED A1C: CPT

## 2019-12-09 PROCEDURE — 99214 OFFICE O/P EST MOD 30 MIN: CPT | Performed by: ORTHOPAEDIC SURGERY

## 2019-12-09 PROCEDURE — 1036F TOBACCO NON-USER: CPT | Performed by: ORTHOPAEDIC SURGERY

## 2019-12-09 PROCEDURE — G8484 FLU IMMUNIZE NO ADMIN: HCPCS | Performed by: ORTHOPAEDIC SURGERY

## 2019-12-09 PROCEDURE — 36415 COLL VENOUS BLD VENIPUNCTURE: CPT

## 2019-12-09 PROCEDURE — 85025 COMPLETE CBC W/AUTO DIFF WBC: CPT

## 2019-12-09 PROCEDURE — 87086 URINE CULTURE/COLONY COUNT: CPT

## 2019-12-09 PROCEDURE — 81001 URINALYSIS AUTO W/SCOPE: CPT

## 2019-12-09 PROCEDURE — 3017F COLORECTAL CA SCREEN DOC REV: CPT | Performed by: ORTHOPAEDIC SURGERY

## 2019-12-09 PROCEDURE — 82306 VITAMIN D 25 HYDROXY: CPT

## 2019-12-09 PROCEDURE — 85730 THROMBOPLASTIN TIME PARTIAL: CPT

## 2019-12-10 LAB
ESTIMATED AVERAGE GLUCOSE: 131.2 MG/DL
HBA1C MFR BLD: 6.2 %
URINE CULTURE, ROUTINE: NORMAL

## 2019-12-11 ENCOUNTER — TELEPHONE (OUTPATIENT)
Dept: ORTHOPEDIC SURGERY | Age: 59
End: 2019-12-11

## 2019-12-12 ENCOUNTER — OFFICE VISIT (OUTPATIENT)
Dept: PRIMARY CARE CLINIC | Age: 59
End: 2019-12-12
Payer: MEDICARE

## 2019-12-12 VITALS
WEIGHT: 199 LBS | SYSTOLIC BLOOD PRESSURE: 145 MMHG | HEART RATE: 69 BPM | OXYGEN SATURATION: 92 % | TEMPERATURE: 97.9 F | BODY MASS INDEX: 37.57 KG/M2 | HEIGHT: 61 IN | DIASTOLIC BLOOD PRESSURE: 95 MMHG

## 2019-12-12 DIAGNOSIS — E11.9 TYPE 2 DIABETES MELLITUS WITHOUT COMPLICATION, WITHOUT LONG-TERM CURRENT USE OF INSULIN (HCC): ICD-10-CM

## 2019-12-12 DIAGNOSIS — R68.89 FLU-LIKE SYMPTOMS: Primary | ICD-10-CM

## 2019-12-12 DIAGNOSIS — Z13.220 SCREENING FOR HYPERLIPIDEMIA: ICD-10-CM

## 2019-12-12 DIAGNOSIS — E66.9 OBESITY (BMI 35.0-39.9 WITHOUT COMORBIDITY): ICD-10-CM

## 2019-12-12 DIAGNOSIS — I10 ESSENTIAL HYPERTENSION: ICD-10-CM

## 2019-12-12 LAB
CHOLESTEROL, FASTING: 203 MG/DL (ref 0–199)
HDLC SERPL-MCNC: 63 MG/DL (ref 40–60)
LDL CHOLESTEROL CALCULATED: 119 MG/DL
TRIGLYCERIDE, FASTING: 104 MG/DL (ref 0–150)
VLDLC SERPL CALC-MCNC: 21 MG/DL

## 2019-12-12 PROCEDURE — 99213 OFFICE O/P EST LOW 20 MIN: CPT | Performed by: INTERNAL MEDICINE

## 2019-12-12 PROCEDURE — 1036F TOBACCO NON-USER: CPT | Performed by: INTERNAL MEDICINE

## 2019-12-12 PROCEDURE — G0438 PPPS, INITIAL VISIT: HCPCS | Performed by: INTERNAL MEDICINE

## 2019-12-12 PROCEDURE — G8428 CUR MEDS NOT DOCUMENT: HCPCS | Performed by: INTERNAL MEDICINE

## 2019-12-12 PROCEDURE — 3017F COLORECTAL CA SCREEN DOC REV: CPT | Performed by: INTERNAL MEDICINE

## 2019-12-12 PROCEDURE — G8484 FLU IMMUNIZE NO ADMIN: HCPCS | Performed by: INTERNAL MEDICINE

## 2019-12-12 PROCEDURE — 2022F DILAT RTA XM EVC RTNOPTHY: CPT | Performed by: INTERNAL MEDICINE

## 2019-12-12 PROCEDURE — 3044F HG A1C LEVEL LT 7.0%: CPT | Performed by: INTERNAL MEDICINE

## 2019-12-12 PROCEDURE — G8417 CALC BMI ABV UP PARAM F/U: HCPCS | Performed by: INTERNAL MEDICINE

## 2019-12-12 RX ORDER — TIZANIDINE 2 MG/1
2 TABLET ORAL EVERY 8 HOURS PRN
Qty: 30 TABLET | Refills: 0 | Status: SHIPPED | OUTPATIENT
Start: 2019-12-12 | End: 2019-12-22

## 2019-12-12 RX ORDER — AMLODIPINE BESYLATE 10 MG/1
10 TABLET ORAL DAILY
Qty: 30 TABLET | Refills: 5 | Status: SHIPPED | OUTPATIENT
Start: 2019-12-12 | End: 2020-09-11 | Stop reason: SDUPTHER

## 2019-12-12 RX ORDER — ATORVASTATIN CALCIUM 20 MG/1
20 TABLET, FILM COATED ORAL DAILY
Qty: 30 TABLET | Refills: 5 | Status: SHIPPED | OUTPATIENT
Start: 2019-12-12 | End: 2019-12-20

## 2019-12-12 RX ORDER — DOCUSATE SODIUM 100 MG/1
100 CAPSULE, LIQUID FILLED ORAL 2 TIMES DAILY PRN
Qty: 60 CAPSULE | Refills: 0 | Status: SHIPPED | OUTPATIENT
Start: 2019-12-12 | End: 2020-02-03 | Stop reason: ALTCHOICE

## 2019-12-12 RX ORDER — GUAIFENESIN AND CODEINE PHOSPHATE 100; 10 MG/5ML; MG/5ML
5 SOLUTION ORAL 2 TIMES DAILY PRN
Qty: 118 ML | Refills: 0 | Status: SHIPPED | OUTPATIENT
Start: 2019-12-12 | End: 2019-12-15

## 2019-12-12 RX ORDER — ASPIRIN 81 MG/1
81 TABLET ORAL DAILY
Qty: 30 TABLET | Refills: 5 | Status: SHIPPED | OUTPATIENT
Start: 2019-12-12 | End: 2020-02-03 | Stop reason: ALTCHOICE

## 2019-12-12 RX ORDER — ERGOCALCIFEROL 1.25 MG/1
50000 CAPSULE ORAL WEEKLY
Qty: 4 CAPSULE | Refills: 5 | Status: SHIPPED | OUTPATIENT
Start: 2019-12-12 | End: 2020-02-10 | Stop reason: SDUPTHER

## 2019-12-12 RX ORDER — CHLORTHALIDONE 25 MG/1
25 TABLET ORAL DAILY
Qty: 30 TABLET | Refills: 5 | Status: SHIPPED | OUTPATIENT
Start: 2019-12-12 | End: 2020-02-10 | Stop reason: SDUPTHER

## 2019-12-12 ASSESSMENT — LIFESTYLE VARIABLES
HOW OFTEN DO YOU HAVE A DRINK CONTAINING ALCOHOL: 1
HOW OFTEN DURING THE LAST YEAR HAVE YOU NEEDED AN ALCOHOLIC DRINK FIRST THING IN THE MORNING TO GET YOURSELF GOING AFTER A NIGHT OF HEAVY DRINKING: 0
HOW OFTEN DO YOU HAVE SIX OR MORE DRINKS ON ONE OCCASION: 0
AUDIT TOTAL SCORE: 1
AUDIT-C TOTAL SCORE: 1
HAVE YOU OR SOMEONE ELSE BEEN INJURED AS A RESULT OF YOUR DRINKING: 0
HOW OFTEN DURING THE LAST YEAR HAVE YOU HAD A FEELING OF GUILT OR REMORSE AFTER DRINKING: 0
HOW OFTEN DURING THE LAST YEAR HAVE YOU FAILED TO DO WHAT WAS NORMALLY EXPECTED FROM YOU BECAUSE OF DRINKING: 0
HOW MANY STANDARD DRINKS CONTAINING ALCOHOL DO YOU HAVE ON A TYPICAL DAY: 0
HOW OFTEN DURING THE LAST YEAR HAVE YOU BEEN UNABLE TO REMEMBER WHAT HAPPENED THE NIGHT BEFORE BECAUSE YOU HAD BEEN DRINKING: 0
HOW OFTEN DURING THE LAST YEAR HAVE YOU FOUND THAT YOU WERE NOT ABLE TO STOP DRINKING ONCE YOU HAD STARTED: 0
HAS A RELATIVE, FRIEND, DOCTOR, OR ANOTHER HEALTH PROFESSIONAL EXPRESSED CONCERN ABOUT YOUR DRINKING OR SUGGESTED YOU CUT DOWN: 0
HOW OFTEN DO YOU HAVE A DRINK CONTAINING ALCOHOL: 0

## 2019-12-12 ASSESSMENT — PATIENT HEALTH QUESTIONNAIRE - PHQ9
SUM OF ALL RESPONSES TO PHQ QUESTIONS 1-9: 0
SUM OF ALL RESPONSES TO PHQ QUESTIONS 1-9: 0

## 2019-12-18 ENCOUNTER — TELEPHONE (OUTPATIENT)
Dept: PRIMARY CARE CLINIC | Age: 59
End: 2019-12-18

## 2019-12-18 DIAGNOSIS — J40 BRONCHITIS: Primary | ICD-10-CM

## 2019-12-18 RX ORDER — AZITHROMYCIN 250 MG/1
250 TABLET, FILM COATED ORAL SEE ADMIN INSTRUCTIONS
Qty: 6 TABLET | Refills: 0 | Status: SHIPPED | OUTPATIENT
Start: 2019-12-18 | End: 2019-12-23

## 2019-12-20 DIAGNOSIS — E11.9 TYPE 2 DIABETES MELLITUS WITHOUT COMPLICATION, WITHOUT LONG-TERM CURRENT USE OF INSULIN (HCC): ICD-10-CM

## 2019-12-20 RX ORDER — ATORVASTATIN CALCIUM 40 MG/1
40 TABLET, FILM COATED ORAL DAILY
Qty: 30 TABLET | Refills: 5 | Status: SHIPPED | OUTPATIENT
Start: 2019-12-20 | End: 2021-06-25 | Stop reason: SDUPTHER

## 2019-12-23 ASSESSMENT — ENCOUNTER SYMPTOMS
DIARRHEA: 0
SHORTNESS OF BREATH: 0
ABDOMINAL PAIN: 0
COUGH: 1
NAUSEA: 0
SORE THROAT: 1
VOMITING: 0
CONSTIPATION: 0

## 2019-12-27 ENCOUNTER — HOSPITAL ENCOUNTER (OUTPATIENT)
Dept: PHYSICAL THERAPY | Age: 59
Setting detail: THERAPIES SERIES
Discharge: HOME | End: 2019-12-27
Payer: MEDICARE

## 2019-12-27 PROCEDURE — 97162 PT EVAL MOD COMPLEX 30 MIN: CPT

## 2019-12-27 PROCEDURE — 97140 MANUAL THERAPY 1/> REGIONS: CPT

## 2019-12-27 PROCEDURE — 97110 THERAPEUTIC EXERCISES: CPT

## 2019-12-27 ASSESSMENT — PAIN DESCRIPTION - DESCRIPTORS: DESCRIPTORS: ACHING;SHARP;SHOOTING;SORE

## 2019-12-27 ASSESSMENT — PAIN - FUNCTIONAL ASSESSMENT: PAIN_FUNCTIONAL_ASSESSMENT: PREVENTS OR INTERFERES SOME ACTIVE ACTIVITIES AND ADLS

## 2019-12-27 ASSESSMENT — PAIN SCALES - GENERAL: PAINLEVEL_OUTOF10: 8

## 2019-12-27 ASSESSMENT — PAIN DESCRIPTION - ORIENTATION: ORIENTATION: RIGHT

## 2019-12-27 ASSESSMENT — PAIN DESCRIPTION - FREQUENCY: FREQUENCY: CONTINUOUS

## 2019-12-27 ASSESSMENT — PAIN DESCRIPTION - ONSET: ONSET: ON-GOING

## 2019-12-27 ASSESSMENT — PAIN DESCRIPTION - PROGRESSION: CLINICAL_PROGRESSION: GRADUALLY WORSENING

## 2019-12-27 ASSESSMENT — PAIN DESCRIPTION - LOCATION: LOCATION: KNEE

## 2019-12-27 ASSESSMENT — PAIN DESCRIPTION - PAIN TYPE: TYPE: CHRONIC PAIN

## 2020-01-20 ENCOUNTER — PREP FOR PROCEDURE (OUTPATIENT)
Dept: ORTHOPEDICS UNIT | Age: 60
End: 2020-01-20

## 2020-01-20 RX ORDER — CELECOXIB 200 MG/1
200 CAPSULE ORAL ONCE
Status: CANCELLED | OUTPATIENT
Start: 2020-01-20 | End: 2020-01-20

## 2020-01-20 RX ORDER — OXYCODONE HYDROCHLORIDE 5 MG/1
10 TABLET ORAL ONCE
Status: CANCELLED | OUTPATIENT
Start: 2020-01-20 | End: 2020-01-20

## 2020-01-22 ENCOUNTER — TELEPHONE (OUTPATIENT)
Dept: ORTHOPEDICS UNIT | Age: 60
End: 2020-01-22

## 2020-01-27 ENCOUNTER — PREP FOR PROCEDURE (OUTPATIENT)
Dept: ORTHOPEDIC SURGERY | Age: 60
End: 2020-01-27

## 2020-01-29 ENCOUNTER — HOSPITAL ENCOUNTER (OUTPATIENT)
Dept: CT IMAGING | Age: 60
Discharge: HOME | End: 2020-01-29
Payer: MEDICARE

## 2020-01-29 ENCOUNTER — HOSPITAL ENCOUNTER (OUTPATIENT)
Dept: PREADMISSION TESTING | Age: 60
Discharge: HOME | End: 2020-02-02
Payer: MEDICARE

## 2020-01-29 LAB
ABO/RH: NORMAL
ALBUMIN SERPL-MCNC: 3.6 G/DL (ref 3.4–5)
ANION GAP SERPL CALCULATED.3IONS-SCNC: 12 MMOL/L (ref 3–16)
ANTIBODY SCREEN: NORMAL
APTT: 36.7 SEC (ref 24.2–36.2)
BASOPHILS ABSOLUTE: 0 K/UL (ref 0–0.2)
BASOPHILS RELATIVE PERCENT: 0.5 %
BILIRUBIN URINE: NEGATIVE
BLOOD, URINE: NEGATIVE
BUN BLDV-MCNC: 9 MG/DL (ref 7–20)
CALCIUM SERPL-MCNC: 9.4 MG/DL (ref 8.3–10.6)
CHLORIDE BLD-SCNC: 103 MMOL/L (ref 99–110)
CLARITY: CLEAR
CO2: 26 MMOL/L (ref 21–32)
COLOR: YELLOW
CREAT SERPL-MCNC: 0.6 MG/DL (ref 0.6–1.1)
EOSINOPHILS ABSOLUTE: 0.1 K/UL (ref 0–0.6)
EOSINOPHILS RELATIVE PERCENT: 2 %
ESTIMATED AVERAGE GLUCOSE: 134.1 MG/DL
GFR AFRICAN AMERICAN: >60
GFR NON-AFRICAN AMERICAN: >60
GLUCOSE BLD-MCNC: 105 MG/DL (ref 70–99)
GLUCOSE URINE: NEGATIVE MG/DL
HBA1C MFR BLD: 6.3 %
HCT VFR BLD CALC: 39.4 % (ref 36–48)
HEMOGLOBIN: 12.6 G/DL (ref 12–16)
INR BLD: 0.97 (ref 0.86–1.14)
KETONES, URINE: NEGATIVE MG/DL
LEUKOCYTE ESTERASE, URINE: NEGATIVE
LYMPHOCYTES ABSOLUTE: 1.2 K/UL (ref 1–5.1)
LYMPHOCYTES RELATIVE PERCENT: 16.2 %
MCH RBC QN AUTO: 27.1 PG (ref 26–34)
MCHC RBC AUTO-ENTMCNC: 32 G/DL (ref 31–36)
MCV RBC AUTO: 84.8 FL (ref 80–100)
MICROSCOPIC EXAMINATION: NORMAL
MONOCYTES ABSOLUTE: 0.4 K/UL (ref 0–1.3)
MONOCYTES RELATIVE PERCENT: 5.1 %
MRSA SCREEN RT-PCR: NORMAL
NEUTROPHILS ABSOLUTE: 5.7 K/UL (ref 1.7–7.7)
NEUTROPHILS RELATIVE PERCENT: 76.2 %
NITRITE, URINE: NEGATIVE
PDW BLD-RTO: 16.3 % (ref 12.4–15.4)
PH UA: 5 (ref 5–8)
PLATELET # BLD: 250 K/UL (ref 135–450)
PMV BLD AUTO: 8.8 FL (ref 5–10.5)
POTASSIUM SERPL-SCNC: 4.1 MMOL/L (ref 3.5–5.1)
PREALBUMIN: 24.4 MG/DL (ref 20–40)
PROTEIN UA: NEGATIVE MG/DL
PROTHROMBIN TIME: 11.2 SEC (ref 10–13.2)
RBC # BLD: 4.65 M/UL (ref 4–5.2)
SEDIMENTATION RATE, ERYTHROCYTE: 48 MM/HR (ref 0–30)
SODIUM BLD-SCNC: 141 MMOL/L (ref 136–145)
SPECIFIC GRAVITY UA: 1.03 (ref 1–1.03)
URINE REFLEX TO CULTURE: NORMAL
URINE TYPE: NORMAL
UROBILINOGEN, URINE: 0.2 E.U./DL
VITAMIN D 25-HYDROXY: 26.6 NG/ML
WBC # BLD: 7.5 K/UL (ref 4–11)

## 2020-01-29 PROCEDURE — 83036 HEMOGLOBIN GLYCOSYLATED A1C: CPT

## 2020-01-29 PROCEDURE — 73700 CT LOWER EXTREMITY W/O DYE: CPT

## 2020-01-29 PROCEDURE — 87641 MR-STAPH DNA AMP PROBE: CPT

## 2020-01-29 PROCEDURE — 86900 BLOOD TYPING SEROLOGIC ABO: CPT

## 2020-01-29 PROCEDURE — 84134 ASSAY OF PREALBUMIN: CPT

## 2020-01-29 PROCEDURE — 85610 PROTHROMBIN TIME: CPT

## 2020-01-29 PROCEDURE — 82306 VITAMIN D 25 HYDROXY: CPT

## 2020-01-29 PROCEDURE — 81003 URINALYSIS AUTO W/O SCOPE: CPT

## 2020-01-29 PROCEDURE — 93005 ELECTROCARDIOGRAM TRACING: CPT

## 2020-01-29 PROCEDURE — 86901 BLOOD TYPING SEROLOGIC RH(D): CPT

## 2020-01-29 PROCEDURE — 85730 THROMBOPLASTIN TIME PARTIAL: CPT

## 2020-01-29 PROCEDURE — 85025 COMPLETE CBC W/AUTO DIFF WBC: CPT

## 2020-01-29 PROCEDURE — 86850 RBC ANTIBODY SCREEN: CPT

## 2020-01-29 PROCEDURE — 82040 ASSAY OF SERUM ALBUMIN: CPT

## 2020-01-29 PROCEDURE — 80048 BASIC METABOLIC PNL TOTAL CA: CPT

## 2020-01-29 PROCEDURE — 85652 RBC SED RATE AUTOMATED: CPT

## 2020-01-30 LAB
EKG ATRIAL RATE: 80 BPM
EKG DIAGNOSIS: NORMAL
EKG P AXIS: 43 DEGREES
EKG P-R INTERVAL: 164 MS
EKG Q-T INTERVAL: 382 MS
EKG QRS DURATION: 80 MS
EKG QTC CALCULATION (BAZETT): 440 MS
EKG R AXIS: 1 DEGREES
EKG T AXIS: -17 DEGREES
EKG VENTRICULAR RATE: 80 BPM

## 2020-01-30 PROCEDURE — 93010 ELECTROCARDIOGRAM REPORT: CPT | Performed by: INTERNAL MEDICINE

## 2020-01-31 NOTE — PROGRESS NOTES
Phone message left to call PAT dept at [phone]  for history review and surgery instructions on 1/31/20 @ [phone]

## 2020-02-03 NOTE — PROGRESS NOTES
PRE-OP INSTRUCTIONS     · Do not eat or drink anything after 12:00 midnight prior to surgery. · This includes water, chewing gum, mints and ice chips. · You may brush your teeth and gargle the morning of surgery but DO  NOT SWALLOW THE WATER. Take the following medications with a small sip of water on the morning of procedure. Nohemy Bolanos Follow your MD/Surgeons pre-procedure instructions regarding your medications    · You may be asked to stop blood thinners such as:  Coumadin, Plavix, Fragmin and lovenox. Please check with your doctor before stopping these or any other medications. · Aspirin, ibuprofen, advil and naproxen, any anti-inflammatory products should be stopped for a week prior to your surgery. · Do not smoke and do not drink any alcoholic beverages 24 hours prior to your surgery. · Please do not wear any jewelry or body piercings on the day of surgery. · Please wear something simple, loose fitting clothing to the hospital.  Do not wear any make-up(including eye make-up) or nail polish on your fingers and toes. As part of our patient safety program to minimize surgical infections, we ask you to do the following:    Please notify your surgeon if you develop any illness between now and the day of your surgery. This includes a cough, cold, fever, sore  throat, nausea, vomiting, diarrhea, etc.   Please notify your surgeon if you experience dizziness, shortness of  breath or blurred vision between now and the time of your surgery. Please notify your surgeon of any open or redden areas that may look infected       DO NOT shave your operative site 96 hours(four days) prior to surgery. Shower the week before surgery with an antibacterial soap such as:dial,safeguard, etc.       Three(3) days prior to your surgery, cleanse the operative site with Hibiclens(anti-microbial soap).  This soap may dry your skin, please do not apply any oils or lotions     · Please bring your insurance card and picture ID day of surgery    · If you have a living will or durable power of attorny. Please bring in a copy of you advanced directives. · If you have dentures, they will be removed before going to the OR, we will provide you with a container. If you wear contact lenses/glasses, they will be removes, please bring a case    · Have you seen your family doctor for a pre-op history and physical.      · Surgery scheduler will call you 48 hours prior to your surgery to notify you of the time of your surgery and the time you will need to be at hospital...patients are asked to arrive 2 1/2 hours prior to surgery. ·  Please call Pre-Admission testing if you have any further questions.                   [id number] [facility] testing phone number:  [phone]      Thank You for choosing [facility]!!

## 2020-02-03 NOTE — CARE COORDINATION
DATE OF JET CLASS INTERVIEW: Wed 1/29/2020  FIRST JOINT REPLACEMENT? yes     CHOICES FOR HHC, DME VENDORS AND SKILLED/ REHAB FACILITIES PROVIDED TO PT DURING THIS INTERVIEW. DISCHARGE PLAN:/ INCLUDING WHO WILL BE STAYING WITH YOU AT HOME? Patient lives alone in an apartment --she states her son and daughter   Will stay with her and assist with her care. There are 8 EVA and 7 steps down to her apartment- there is a railing. LENGTH OF STAY HAS BEEN DISCUSSED WITH THE PT, APPROPRIATE TO HIS/ HER PROCEDURE. PT HAS BEEN INFORMED THAT THEY WILL BE DISCHARGED WHEN THE PHYSICIAN DEEMS THEM MEDICALLY READY. MOST PATIENTS CAN EXPECT  TO BE IN THE HOSPITAL ONE NIGHT AS AN OBSERVATION ONLY, OR 1-2 DAYS AS AN ADMISSION FOR THOSE WITH MEDICAL HEALTH  ISSUES/COMPLICATIONS. HOME CARE:  Prefers [facility]  SNF/REHAB:  Denies need  DME:  Has a shower chair--states she will be getting a walker and RTS prior to admission-- will need to confirm upon admission    PT AGREEABLE TO MEDS TO BEDS FROM [employer]. TRANSPORTATION:  States her daughter or son will transport home at [address] [place] [address] Road for Transition of Care is related to the following treatment goals: home    The Patient  was provided with a choice of provider and agrees   with the discharge plan. [x] Yes [] No    Freedom of choice list was provided with basic dialogue that supports the patient's individualized plan of care/goals, treatment preferences and shares the quality data associated with the providers. [x] Yes [] No    Contact information for case management provided to pt. Will follow with therapies and social service.   Electronically signed by Deanna Herrera on 2/3/2020 at 8:49 AM

## 2020-02-06 ENCOUNTER — OFFICE VISIT (OUTPATIENT)
Dept: ORTHOPEDIC SURGERY | Age: 60
End: 2020-02-06

## 2020-02-06 PROCEDURE — 99999 PR OFFICE/OUTPT VISIT,PROCEDURE ONLY: CPT | Performed by: ORTHOPAEDIC SURGERY

## 2020-02-06 PROCEDURE — MISCCOLD COLD THERAPY UNIT AND PAD: Performed by: ORTHOPAEDIC SURGERY

## 2020-02-10 ENCOUNTER — OFFICE VISIT (OUTPATIENT)
Dept: PRIMARY CARE CLINIC | Age: 60
End: 2020-02-10
Payer: MEDICARE

## 2020-02-10 VITALS
HEART RATE: 82 BPM | SYSTOLIC BLOOD PRESSURE: 130 MMHG | HEIGHT: 61 IN | OXYGEN SATURATION: 95 % | BODY MASS INDEX: 35.65 KG/M2 | WEIGHT: 188.8 LBS | DIASTOLIC BLOOD PRESSURE: 90 MMHG

## 2020-02-10 PROCEDURE — G8484 FLU IMMUNIZE NO ADMIN: HCPCS | Performed by: FAMILY MEDICINE

## 2020-02-10 PROCEDURE — 1036F TOBACCO NON-USER: CPT | Performed by: FAMILY MEDICINE

## 2020-02-10 PROCEDURE — 99214 OFFICE O/P EST MOD 30 MIN: CPT | Performed by: FAMILY MEDICINE

## 2020-02-10 PROCEDURE — 2022F DILAT RTA XM EVC RTNOPTHY: CPT | Performed by: FAMILY MEDICINE

## 2020-02-10 PROCEDURE — G8427 DOCREV CUR MEDS BY ELIG CLIN: HCPCS | Performed by: FAMILY MEDICINE

## 2020-02-10 PROCEDURE — G8417 CALC BMI ABV UP PARAM F/U: HCPCS | Performed by: FAMILY MEDICINE

## 2020-02-10 PROCEDURE — 3044F HG A1C LEVEL LT 7.0%: CPT | Performed by: FAMILY MEDICINE

## 2020-02-10 PROCEDURE — 3017F COLORECTAL CA SCREEN DOC REV: CPT | Performed by: FAMILY MEDICINE

## 2020-02-10 RX ORDER — ERGOCALCIFEROL 1.25 MG/1
CAPSULE ORAL
Qty: 8 CAPSULE | Refills: 2 | Status: SHIPPED | OUTPATIENT
Start: 2020-02-10 | End: 2020-02-10

## 2020-02-10 RX ORDER — CHLORTHALIDONE 25 MG/1
25 TABLET ORAL DAILY
Qty: 30 TABLET | Refills: 5 | Status: SHIPPED | OUTPATIENT
Start: 2020-02-10 | End: 2020-12-08 | Stop reason: SDUPTHER

## 2020-02-10 RX ORDER — ERGOCALCIFEROL 1.25 MG/1
50000 CAPSULE ORAL WEEKLY
Qty: 4 CAPSULE | Refills: 5 | Status: SHIPPED | OUTPATIENT
Start: 2020-02-10 | End: 2020-02-10

## 2020-02-10 ASSESSMENT — ENCOUNTER SYMPTOMS
STRIDOR: 0
PHOTOPHOBIA: 0
NAUSEA: 0
BACK PAIN: 0
RHINORRHEA: 0
COUGH: 0
SHORTNESS OF BREATH: 0
ABDOMINAL DISTENTION: 0
APNEA: 0
COLOR CHANGE: 0
VOMITING: 0
SORE THROAT: 0
WHEEZING: 0
TROUBLE SWALLOWING: 0
EYE ITCHING: 0
EYE DISCHARGE: 0
DIARRHEA: 0
CONSTIPATION: 0
EYE REDNESS: 0
RECTAL PAIN: 0
CHOKING: 0
EYE PAIN: 0
BLOOD IN STOOL: 0
CHEST TIGHTNESS: 0
SINUS PRESSURE: 0

## 2020-02-10 ASSESSMENT — PATIENT HEALTH QUESTIONNAIRE - PHQ9
2. FEELING DOWN, DEPRESSED OR HOPELESS: 0
1. LITTLE INTEREST OR PLEASURE IN DOING THINGS: 0
SUM OF ALL RESPONSES TO PHQ QUESTIONS 1-9: 0
SUM OF ALL RESPONSES TO PHQ9 QUESTIONS 1 & 2: 0
SUM OF ALL RESPONSES TO PHQ QUESTIONS 1-9: 0

## 2020-02-10 NOTE — PROGRESS NOTES
Subjective:      Patient ID: Olga Lidia Higgins is a 64 y.o. female. Pre op exam  HPI 63 y/o female chronic right knee pain 2/2 to DJD of the knee scheduled for  Total knee replacement  2/12/2020, per Dr Kenyatta Gasca, orthopedist, [facility].    She denies chest pain or heart disease. She denies stroke , kidney disease. She does have dm-2 well controlled (AIC 6.3) and HTN(controlled). Vitamin d  Def on replacement      No h/o bleeding problems or anesthesia problems    She is postmenopausal      Current Outpatient Medications   Medication Sig Dispense Refill    vitamin D (ERGOCALCIFEROL) 1.25 MG (41452 UT) CAPS capsule Take 1 capsule by mouth once a week 4 capsule 5    chlorthalidone (HYGROTON) 25 MG tablet Take 1 tablet by mouth daily 30 tablet 5    atorvastatin (LIPITOR) 40 MG tablet Take 1 tablet by mouth daily 30 tablet 5    amLODIPine (NORVASC) 10 MG tablet Take 1 tablet by mouth daily 30 tablet 5    metFORMIN (GLUCOPHAGE) 500 MG tablet Take 1 tablet by mouth 2 times daily (with meals) 60 tablet 5    fluticasone (FLONASE) 50 MCG/ACT nasal spray 1 spray by Nasal route daily (Patient not taking: Reported on 2/10/2020) 1 Bottle 3    acetaminophen (TYLENOL) 500 MG tablet Take 1,000 mg by mouth every 4 hours as needed for Pain       No current facility-administered medications for this visit.       Past Medical History:   Diagnosis Date    Allergic rhinitis     Arthritis     Asthma     not currently medically treated    Essential hypertension 2/16/2018    Hyperlipidemia     Neuropathy     Osteoarthritis     Type 2 diabetes mellitus without complication, without long-term current use of insulin ([facility] [address] 4/17/2019    Wears glasses      Social History     Socioeconomic History    Marital status: Single     Spouse name: Not on file    Number of children: 10    Years of education: Not on file    Highest education level: Not on file   Occupational History    Occupation: [address] and trouble swallowing. Eyes: Negative for photophobia, pain, discharge, redness, itching and visual disturbance. Respiratory: Negative for apnea, cough, choking, chest tightness, shortness of breath, wheezing and stridor. Cardiovascular: Negative for chest pain, palpitations and leg swelling. Gastrointestinal: Negative for abdominal distention, blood in stool, constipation, diarrhea, nausea, rectal pain and vomiting. Genitourinary: Negative for decreased urine volume, difficulty urinating, dyspareunia, dysuria, enuresis, flank pain, frequency, genital sores, hematuria, menstrual problem, pelvic pain, urgency, vaginal bleeding and vaginal pain. Musculoskeletal: Negative for arthralgias, back pain, gait problem, joint swelling, myalgias, neck pain and neck stiffness. Chronic bilat knee pain R>L 2/2 to DJD   Skin: Negative for color change, pallor, rash and wound. Neurological: Negative for dizziness, tremors, seizures, syncope, facial asymmetry, speech difficulty, weakness, light-headedness, numbness and headaches. Hematological: Negative for adenopathy. Does not bruise/bleed easily. Psychiatric/Behavioral: Negative for agitation, behavioral problems, confusion, decreased concentration, dysphoric mood, hallucinations, self-injury, sleep disturbance and suicidal ideas. The patient is not nervous/anxious and is not hyperactive. Objective:   Physical Exam  Vitals signs and nursing note reviewed. Constitutional:       General: She is not in acute distress. Appearance: She is well-developed. She is not diaphoretic. HENT:      Head: Normocephalic and atraumatic. Right Ear: External ear normal.      Left Ear: External ear normal.      Nose: Nose normal.      Mouth/Throat:      Pharynx: No oropharyngeal exudate. Eyes:      General: No scleral icterus. Left eye: No discharge.       Conjunctiva/sclera: Conjunctivae normal.      Pupils: Pupils are equal, round, and reactive to light. Neck:      Musculoskeletal: Normal range of motion and neck supple. Thyroid: No thyromegaly. Vascular: No JVD. Trachea: No tracheal deviation. Cardiovascular:      Rate and Rhythm: Normal rate and regular rhythm. Heart sounds: Normal heart sounds. No murmur. No friction rub. No gallop. Pulmonary:      Effort: Pulmonary effort is normal. No respiratory distress. Breath sounds: Normal breath sounds. No stridor. No wheezing or rales. Chest:      Chest wall: No tenderness. Abdominal:      General: Bowel sounds are normal. There is no distension. Palpations: Abdomen is soft. There is no mass. Tenderness: There is no abdominal tenderness. There is no guarding or rebound. Musculoskeletal:         General: No tenderness. Comments: Moderate deformity and tenderness about the right knee. Lymphadenopathy:      Cervical: No cervical adenopathy. Skin:     General: Skin is warm and dry. Coloration: Skin is not pale. Findings: No erythema or rash. Neurological:      Mental Status: She is alert and oriented to person, place, and time. Cranial Nerves: No cranial nerve deficit. Coordination: Coordination normal.      Deep Tendon Reflexes: Reflexes are normal and symmetric. Reflexes normal.   Psychiatric:         Behavior: Behavior normal.         Thought Content: Thought content normal.         Judgment: Judgment normal.         Assessment:      1. Pre-op exam    Recent labs and ekg noted  MEDICALLY CLEAR FOR SURGERY  2. Primary osteoarthritis of right knee      3. Essential hypertension  BP ACCEPTABLE  RECENT RENAL OK  SHOULD TAKE BP MEDS EVERY DAY  - chlorthalidone (HYGROTON) 25 MG tablet; Take 1 tablet by mouth daily  Dispense: 30 tablet; Refill: 5    4. Controlled type 2 diabetes mellitus without complication, without long-term current use of insulin ([facility])    AIC 6.3  AT GOAL  5.  Vitamin D deficiency  Low vit d 26  Increase vit d to twice a week        Plan:      Copy of consult note sent to Dr Matthew Barlow  Medically clear for surgery        Tremaine George MD

## 2020-02-11 ENCOUNTER — ANESTHESIA EVENT (OUTPATIENT)
Dept: OPERATING ROOM | Age: 60
DRG: 470 | End: 2020-02-11
Payer: MEDICARE

## 2020-02-12 ENCOUNTER — APPOINTMENT (OUTPATIENT)
Dept: GENERAL RADIOLOGY | Age: 60
DRG: 470 | End: 2020-02-12
Attending: ORTHOPAEDIC SURGERY
Payer: MEDICARE

## 2020-02-12 ENCOUNTER — HOSPITAL ENCOUNTER (INPATIENT)
Age: 60
LOS: 1 days | Discharge: HOME | DRG: 470 | End: 2020-02-13
Attending: ORTHOPAEDIC SURGERY | Admitting: ORTHOPAEDIC SURGERY
Payer: MEDICARE

## 2020-02-12 ENCOUNTER — ANESTHESIA (OUTPATIENT)
Dept: OPERATING ROOM | Age: 60
DRG: 470 | End: 2020-02-12
Payer: MEDICARE

## 2020-02-12 VITALS
RESPIRATION RATE: 1 BRPM | DIASTOLIC BLOOD PRESSURE: 64 MMHG | TEMPERATURE: 95.5 F | SYSTOLIC BLOOD PRESSURE: 110 MMHG | OXYGEN SATURATION: 93 %

## 2020-02-12 PROBLEM — M17.11 ARTHRITIS OF RIGHT KNEE: Status: ACTIVE | Noted: 2020-02-12

## 2020-02-12 LAB
ABO/RH: NORMAL
ANTIBODY SCREEN: NORMAL
GLUCOSE BLD-MCNC: 102 MG/DL (ref 70–99)
GLUCOSE BLD-MCNC: 115 MG/DL (ref 70–99)
GLUCOSE BLD-MCNC: 196 MG/DL (ref 70–99)
PERFORMED ON: ABNORMAL

## 2020-02-12 PROCEDURE — 8E0Y0CZ ROBOTIC ASSISTED PROCEDURE OF LOWER EXTREMITY, OPEN APPROACH: ICD-10-PCS | Performed by: ORTHOPAEDIC SURGERY

## 2020-02-12 PROCEDURE — 3700000001 HC ADD 15 MINUTES (ANESTHESIA): Performed by: ORTHOPAEDIC SURGERY

## 2020-02-12 PROCEDURE — 6360000002 HC RX W HCPCS: Performed by: ORTHOPAEDIC SURGERY

## 2020-02-12 PROCEDURE — 86900 BLOOD TYPING SEROLOGIC ABO: CPT

## 2020-02-12 PROCEDURE — 88305 TISSUE EXAM BY PATHOLOGIST: CPT

## 2020-02-12 PROCEDURE — 73560 X-RAY EXAM OF KNEE 1 OR 2: CPT

## 2020-02-12 PROCEDURE — 6370000000 HC RX 637 (ALT 250 FOR IP): Performed by: ORTHOPAEDIC SURGERY

## 2020-02-12 PROCEDURE — 7100000001 HC PACU RECOVERY - ADDTL 15 MIN: Performed by: ORTHOPAEDIC SURGERY

## 2020-02-12 PROCEDURE — 2720000010 HC SURG SUPPLY STERILE: Performed by: ORTHOPAEDIC SURGERY

## 2020-02-12 PROCEDURE — 2580000003 HC RX 258: Performed by: ORTHOPAEDIC SURGERY

## 2020-02-12 PROCEDURE — C1713 ANCHOR/SCREW BN/BN,TIS/BN: HCPCS | Performed by: ORTHOPAEDIC SURGERY

## 2020-02-12 PROCEDURE — 97116 GAIT TRAINING THERAPY: CPT

## 2020-02-12 PROCEDURE — 2500000003 HC RX 250 WO HCPCS: Performed by: ORTHOPAEDIC SURGERY

## 2020-02-12 PROCEDURE — 83036 HEMOGLOBIN GLYCOSYLATED A1C: CPT

## 2020-02-12 PROCEDURE — 6360000002 HC RX W HCPCS: Performed by: NURSE ANESTHETIST, CERTIFIED REGISTERED

## 2020-02-12 PROCEDURE — 7100000000 HC PACU RECOVERY - FIRST 15 MIN: Performed by: ORTHOPAEDIC SURGERY

## 2020-02-12 PROCEDURE — 86901 BLOOD TYPING SEROLOGIC RH(D): CPT

## 2020-02-12 PROCEDURE — 1200000000 HC SEMI PRIVATE

## 2020-02-12 PROCEDURE — 36415 COLL VENOUS BLD VENIPUNCTURE: CPT

## 2020-02-12 PROCEDURE — 97535 SELF CARE MNGMENT TRAINING: CPT

## 2020-02-12 PROCEDURE — 97530 THERAPEUTIC ACTIVITIES: CPT

## 2020-02-12 PROCEDURE — 97162 PT EVAL MOD COMPLEX 30 MIN: CPT

## 2020-02-12 PROCEDURE — 94760 N-INVAS EAR/PLS OXIMETRY 1: CPT

## 2020-02-12 PROCEDURE — 3600000005 HC SURGERY LEVEL 5 BASE: Performed by: ORTHOPAEDIC SURGERY

## 2020-02-12 PROCEDURE — C1776 JOINT DEVICE (IMPLANTABLE): HCPCS | Performed by: ORTHOPAEDIC SURGERY

## 2020-02-12 PROCEDURE — 2709999900 HC NON-CHARGEABLE SUPPLY: Performed by: ORTHOPAEDIC SURGERY

## 2020-02-12 PROCEDURE — 2500000003 HC RX 250 WO HCPCS: Performed by: NURSE ANESTHETIST, CERTIFIED REGISTERED

## 2020-02-12 PROCEDURE — 97166 OT EVAL MOD COMPLEX 45 MIN: CPT

## 2020-02-12 PROCEDURE — 88311 DECALCIFY TISSUE: CPT

## 2020-02-12 PROCEDURE — 3600000015 HC SURGERY LEVEL 5 ADDTL 15MIN: Performed by: ORTHOPAEDIC SURGERY

## 2020-02-12 PROCEDURE — 3700000000 HC ANESTHESIA ATTENDED CARE: Performed by: ORTHOPAEDIC SURGERY

## 2020-02-12 PROCEDURE — 2580000003 HC RX 258: Performed by: ANESTHESIOLOGY

## 2020-02-12 PROCEDURE — 86850 RBC ANTIBODY SCREEN: CPT

## 2020-02-12 PROCEDURE — 0SRC0JA REPLACEMENT OF RIGHT KNEE JOINT WITH SYNTHETIC SUBSTITUTE, UNCEMENTED, OPEN APPROACH: ICD-10-PCS | Performed by: ORTHOPAEDIC SURGERY

## 2020-02-12 DEVICE — IMPLANTABLE DEVICE: Type: IMPLANTABLE DEVICE | Site: KNEE | Status: FUNCTIONAL

## 2020-02-12 DEVICE — COMPONENT PAT DIA31MM THK9MM KNEE TRITANIUM MTL BK: Type: IMPLANTABLE DEVICE | Site: KNEE | Status: FUNCTIONAL

## 2020-02-12 DEVICE — BASEPLATE TIB SZ 2 AP42MM ML64MM KNEE TRITANIUM 4 CRUCFRM: Type: IMPLANTABLE DEVICE | Site: KNEE | Status: FUNCTIONAL

## 2020-02-12 DEVICE — INSERT TIB BEAR SZ 2 THK9MM KNEE X3 CNDYL STABILIZING: Type: IMPLANTABLE DEVICE | Site: KNEE | Status: FUNCTIONAL

## 2020-02-12 RX ORDER — FLUTICASONE PROPIONATE 50 MCG
1 SPRAY, SUSPENSION (ML) NASAL DAILY
Status: DISCONTINUED | OUTPATIENT
Start: 2020-02-13 | End: 2020-02-13 | Stop reason: HOSPADM

## 2020-02-12 RX ORDER — LABETALOL HYDROCHLORIDE 5 MG/ML
5 INJECTION, SOLUTION INTRAVENOUS EVERY 10 MIN PRN
Status: DISCONTINUED | OUTPATIENT
Start: 2020-02-12 | End: 2020-02-12 | Stop reason: HOSPADM

## 2020-02-12 RX ORDER — SODIUM CHLORIDE 0.9 % (FLUSH) 0.9 %
10 SYRINGE (ML) INJECTION PRN
Status: DISCONTINUED | OUTPATIENT
Start: 2020-02-12 | End: 2020-02-13 | Stop reason: HOSPADM

## 2020-02-12 RX ORDER — TRANEXAMIC ACID 100 MG/ML
INJECTION, SOLUTION INTRAVENOUS
Status: COMPLETED | OUTPATIENT
Start: 2020-02-12 | End: 2020-02-12

## 2020-02-12 RX ORDER — MORPHINE SULFATE 2 MG/ML
2 INJECTION, SOLUTION INTRAMUSCULAR; INTRAVENOUS
Status: DISCONTINUED | OUTPATIENT
Start: 2020-02-12 | End: 2020-02-13 | Stop reason: HOSPADM

## 2020-02-12 RX ORDER — OXYCODONE HYDROCHLORIDE 5 MG/1
5-10 TABLET ORAL EVERY 4 HOURS PRN
Qty: 40 TABLET | Refills: 0 | Status: SHIPPED | OUTPATIENT
Start: 2020-02-12 | End: 2020-02-19 | Stop reason: SDUPTHER

## 2020-02-12 RX ORDER — ASPIRIN 81 MG/1
81 TABLET ORAL 2 TIMES DAILY
Qty: 28 TABLET | Refills: 0 | Status: SHIPPED | OUTPATIENT
Start: 2020-02-12 | End: 2020-12-08 | Stop reason: SDUPTHER

## 2020-02-12 RX ORDER — SODIUM CHLORIDE 9 MG/ML
INJECTION, SOLUTION INTRAVENOUS CONTINUOUS
Status: DISCONTINUED | OUTPATIENT
Start: 2020-02-12 | End: 2020-02-12

## 2020-02-12 RX ORDER — PROPOFOL 10 MG/ML
INJECTION, EMULSION INTRAVENOUS CONTINUOUS PRN
Status: DISCONTINUED | OUTPATIENT
Start: 2020-02-12 | End: 2020-02-12 | Stop reason: SDUPTHER

## 2020-02-12 RX ORDER — ONDANSETRON 2 MG/ML
4 INJECTION INTRAMUSCULAR; INTRAVENOUS EVERY 6 HOURS PRN
Status: DISCONTINUED | OUTPATIENT
Start: 2020-02-12 | End: 2020-02-13 | Stop reason: HOSPADM

## 2020-02-12 RX ORDER — GLYCOPYRROLATE 0.2 MG/ML
INJECTION INTRAMUSCULAR; INTRAVENOUS PRN
Status: DISCONTINUED | OUTPATIENT
Start: 2020-02-12 | End: 2020-02-12 | Stop reason: SDUPTHER

## 2020-02-12 RX ORDER — ONDANSETRON 2 MG/ML
INJECTION INTRAMUSCULAR; INTRAVENOUS PRN
Status: DISCONTINUED | OUTPATIENT
Start: 2020-02-12 | End: 2020-02-12 | Stop reason: SDUPTHER

## 2020-02-12 RX ORDER — CHLORTHALIDONE 25 MG/1
25 TABLET ORAL DAILY
Status: DISCONTINUED | OUTPATIENT
Start: 2020-02-13 | End: 2020-02-13 | Stop reason: HOSPADM

## 2020-02-12 RX ORDER — MORPHINE SULFATE 2 MG/ML
2 INJECTION, SOLUTION INTRAMUSCULAR; INTRAVENOUS EVERY 5 MIN PRN
Status: DISCONTINUED | OUTPATIENT
Start: 2020-02-12 | End: 2020-02-12 | Stop reason: HOSPADM

## 2020-02-12 RX ORDER — HYDRALAZINE HYDROCHLORIDE 20 MG/ML
5 INJECTION INTRAMUSCULAR; INTRAVENOUS
Status: DISCONTINUED | OUTPATIENT
Start: 2020-02-12 | End: 2020-02-12 | Stop reason: HOSPADM

## 2020-02-12 RX ORDER — OXYCODONE HYDROCHLORIDE AND ACETAMINOPHEN 5; 325 MG/1; MG/1
1 TABLET ORAL PRN
Status: DISCONTINUED | OUTPATIENT
Start: 2020-02-12 | End: 2020-02-12 | Stop reason: HOSPADM

## 2020-02-12 RX ORDER — DEXTROSE MONOHYDRATE 50 MG/ML
100 INJECTION, SOLUTION INTRAVENOUS PRN
Status: DISCONTINUED | OUTPATIENT
Start: 2020-02-12 | End: 2020-02-13 | Stop reason: HOSPADM

## 2020-02-12 RX ORDER — ONDANSETRON 2 MG/ML
4 INJECTION INTRAMUSCULAR; INTRAVENOUS
Status: DISCONTINUED | OUTPATIENT
Start: 2020-02-12 | End: 2020-02-12 | Stop reason: HOSPADM

## 2020-02-12 RX ORDER — CELECOXIB 200 MG/1
200 CAPSULE ORAL ONCE
Status: COMPLETED | OUTPATIENT
Start: 2020-02-12 | End: 2020-02-12

## 2020-02-12 RX ORDER — CELECOXIB 200 MG/1
200 CAPSULE ORAL EVERY 24 HOURS
Status: DISCONTINUED | OUTPATIENT
Start: 2020-02-13 | End: 2020-02-13 | Stop reason: HOSPADM

## 2020-02-12 RX ORDER — OXYCODONE HYDROCHLORIDE 5 MG/1
5 TABLET ORAL EVERY 4 HOURS PRN
Status: DISCONTINUED | OUTPATIENT
Start: 2020-02-12 | End: 2020-02-13 | Stop reason: HOSPADM

## 2020-02-12 RX ORDER — SODIUM CHLORIDE 450 MG/100ML
INJECTION, SOLUTION INTRAVENOUS CONTINUOUS
Status: DISCONTINUED | OUTPATIENT
Start: 2020-02-12 | End: 2020-02-13 | Stop reason: HOSPADM

## 2020-02-12 RX ORDER — ACETAMINOPHEN 325 MG/1
650 TABLET ORAL EVERY 6 HOURS
Status: DISCONTINUED | OUTPATIENT
Start: 2020-02-12 | End: 2020-02-13 | Stop reason: HOSPADM

## 2020-02-12 RX ORDER — SODIUM CHLORIDE 0.9 % (FLUSH) 0.9 %
10 SYRINGE (ML) INJECTION EVERY 12 HOURS SCHEDULED
Status: DISCONTINUED | OUTPATIENT
Start: 2020-02-12 | End: 2020-02-13 | Stop reason: HOSPADM

## 2020-02-12 RX ORDER — ATORVASTATIN CALCIUM 40 MG/1
40 TABLET, FILM COATED ORAL DAILY
Status: DISCONTINUED | OUTPATIENT
Start: 2020-02-13 | End: 2020-02-13 | Stop reason: HOSPADM

## 2020-02-12 RX ORDER — SENNA AND DOCUSATE SODIUM 50; 8.6 MG/1; MG/1
1 TABLET, FILM COATED ORAL 2 TIMES DAILY
Status: DISCONTINUED | OUTPATIENT
Start: 2020-02-12 | End: 2020-02-13 | Stop reason: HOSPADM

## 2020-02-12 RX ORDER — OXYCODONE HYDROCHLORIDE 10 MG/1
10 TABLET ORAL EVERY 4 HOURS PRN
Status: DISCONTINUED | OUTPATIENT
Start: 2020-02-12 | End: 2020-02-13 | Stop reason: HOSPADM

## 2020-02-12 RX ORDER — FENTANYL CITRATE 50 UG/ML
INJECTION, SOLUTION INTRAMUSCULAR; INTRAVENOUS PRN
Status: DISCONTINUED | OUTPATIENT
Start: 2020-02-12 | End: 2020-02-12 | Stop reason: SDUPTHER

## 2020-02-12 RX ORDER — SODIUM CHLORIDE 0.9 % (FLUSH) 0.9 %
10 SYRINGE (ML) INJECTION PRN
Status: DISCONTINUED | OUTPATIENT
Start: 2020-02-12 | End: 2020-02-12 | Stop reason: HOSPADM

## 2020-02-12 RX ORDER — MIDAZOLAM HYDROCHLORIDE 1 MG/ML
INJECTION INTRAMUSCULAR; INTRAVENOUS PRN
Status: DISCONTINUED | OUTPATIENT
Start: 2020-02-12 | End: 2020-02-12 | Stop reason: SDUPTHER

## 2020-02-12 RX ORDER — MORPHINE SULFATE 2 MG/ML
1 INJECTION, SOLUTION INTRAMUSCULAR; INTRAVENOUS EVERY 5 MIN PRN
Status: DISCONTINUED | OUTPATIENT
Start: 2020-02-12 | End: 2020-02-12 | Stop reason: HOSPADM

## 2020-02-12 RX ORDER — SODIUM CHLORIDE 0.9 % (FLUSH) 0.9 %
10 SYRINGE (ML) INJECTION EVERY 12 HOURS SCHEDULED
Status: DISCONTINUED | OUTPATIENT
Start: 2020-02-12 | End: 2020-02-12 | Stop reason: HOSPADM

## 2020-02-12 RX ORDER — DEXAMETHASONE SODIUM PHOSPHATE 4 MG/ML
INJECTION, SOLUTION INTRA-ARTICULAR; INTRALESIONAL; INTRAMUSCULAR; INTRAVENOUS; SOFT TISSUE PRN
Status: DISCONTINUED | OUTPATIENT
Start: 2020-02-12 | End: 2020-02-12 | Stop reason: SDUPTHER

## 2020-02-12 RX ORDER — BUPIVACAINE HYDROCHLORIDE 7.5 MG/ML
INJECTION, SOLUTION INTRASPINAL PRN
Status: DISCONTINUED | OUTPATIENT
Start: 2020-02-12 | End: 2020-02-12 | Stop reason: SDUPTHER

## 2020-02-12 RX ORDER — LIDOCAINE HYDROCHLORIDE 20 MG/ML
INJECTION, SOLUTION EPIDURAL; INFILTRATION; INTRACAUDAL; PERINEURAL PRN
Status: DISCONTINUED | OUTPATIENT
Start: 2020-02-12 | End: 2020-02-12 | Stop reason: SDUPTHER

## 2020-02-12 RX ORDER — OXYCODONE HYDROCHLORIDE AND ACETAMINOPHEN 5; 325 MG/1; MG/1
2 TABLET ORAL PRN
Status: DISCONTINUED | OUTPATIENT
Start: 2020-02-12 | End: 2020-02-12 | Stop reason: HOSPADM

## 2020-02-12 RX ORDER — OXYCODONE HYDROCHLORIDE 5 MG/1
10 TABLET ORAL ONCE
Status: COMPLETED | OUTPATIENT
Start: 2020-02-12 | End: 2020-02-12

## 2020-02-12 RX ORDER — DOCUSATE SODIUM 100 MG/1
100 CAPSULE, LIQUID FILLED ORAL 2 TIMES DAILY
Status: DISCONTINUED | OUTPATIENT
Start: 2020-02-12 | End: 2020-02-13 | Stop reason: HOSPADM

## 2020-02-12 RX ORDER — INSULIN GLARGINE 100 [IU]/ML
0.25 INJECTION, SOLUTION SUBCUTANEOUS NIGHTLY
Status: DISCONTINUED | OUTPATIENT
Start: 2020-02-12 | End: 2020-02-12

## 2020-02-12 RX ORDER — DEXTROSE MONOHYDRATE 25 G/50ML
12.5 INJECTION, SOLUTION INTRAVENOUS PRN
Status: DISCONTINUED | OUTPATIENT
Start: 2020-02-12 | End: 2020-02-13 | Stop reason: HOSPADM

## 2020-02-12 RX ORDER — NICOTINE POLACRILEX 4 MG
15 LOZENGE BUCCAL PRN
Status: DISCONTINUED | OUTPATIENT
Start: 2020-02-12 | End: 2020-02-13 | Stop reason: HOSPADM

## 2020-02-12 RX ORDER — AMLODIPINE BESYLATE 10 MG/1
10 TABLET ORAL DAILY
Status: DISCONTINUED | OUTPATIENT
Start: 2020-02-13 | End: 2020-02-13 | Stop reason: HOSPADM

## 2020-02-12 RX ORDER — MEPERIDINE HYDROCHLORIDE 25 MG/ML
12.5 INJECTION INTRAMUSCULAR; INTRAVENOUS; SUBCUTANEOUS EVERY 5 MIN PRN
Status: DISCONTINUED | OUTPATIENT
Start: 2020-02-12 | End: 2020-02-12 | Stop reason: HOSPADM

## 2020-02-12 RX ADMIN — TRANEXAMIC ACID 1 G: 1 INJECTION, SOLUTION INTRAVENOUS at 11:39

## 2020-02-12 RX ADMIN — LIDOCAINE HYDROCHLORIDE 50 MG: 20 INJECTION, SOLUTION EPIDURAL; INFILTRATION; INTRACAUDAL; PERINEURAL at 11:42

## 2020-02-12 RX ADMIN — MORPHINE SULFATE 2 MG: 2 INJECTION, SOLUTION INTRAMUSCULAR; INTRAVENOUS at 20:03

## 2020-02-12 RX ADMIN — MORPHINE SULFATE 2 MG: 2 INJECTION, SOLUTION INTRAMUSCULAR; INTRAVENOUS at 16:24

## 2020-02-12 RX ADMIN — FAMOTIDINE 20 MG: 10 INJECTION, SOLUTION INTRAVENOUS at 11:32

## 2020-02-12 RX ADMIN — GLYCOPYRROLATE 0.1 MG: 0.2 INJECTION, SOLUTION INTRAMUSCULAR; INTRAVENOUS at 11:42

## 2020-02-12 RX ADMIN — PROPOFOL 160 MCG/KG/MIN: 10 INJECTION, EMULSION INTRAVENOUS at 11:42

## 2020-02-12 RX ADMIN — TRANEXAMIC ACID 1 G: 1 INJECTION, SOLUTION INTRAVENOUS at 12:36

## 2020-02-12 RX ADMIN — CEFAZOLIN SODIUM 2 G: 10 INJECTION, POWDER, FOR SOLUTION INTRAVENOUS at 11:32

## 2020-02-12 RX ADMIN — SENNOSIDES AND DOCUSATE SODIUM 1 TABLET: 8.6; 5 TABLET ORAL at 20:03

## 2020-02-12 RX ADMIN — ONDANSETRON 4 MG: 2 INJECTION INTRAMUSCULAR; INTRAVENOUS at 11:32

## 2020-02-12 RX ADMIN — CELECOXIB 200 MG: 200 CAPSULE ORAL at 09:58

## 2020-02-12 RX ADMIN — Medication 10 ML: at 20:03

## 2020-02-12 RX ADMIN — OXYCODONE HYDROCHLORIDE 10 MG: 10 TABLET ORAL at 18:51

## 2020-02-12 RX ADMIN — ACETAMINOPHEN 650 MG: 325 TABLET ORAL at 14:25

## 2020-02-12 RX ADMIN — OXYCODONE HYDROCHLORIDE 10 MG: 10 TABLET ORAL at 14:25

## 2020-02-12 RX ADMIN — BUPIVACAINE HYDROCHLORIDE IN DEXTROSE 1.4 ML: 7.5 INJECTION, SOLUTION SUBARACHNOID at 11:40

## 2020-02-12 RX ADMIN — OXYCODONE 10 MG: 5 TABLET ORAL at 09:59

## 2020-02-12 RX ADMIN — CEFAZOLIN 2 G: 1 INJECTION, POWDER, FOR SOLUTION INTRAMUSCULAR; INTRAVENOUS at 20:02

## 2020-02-12 RX ADMIN — FENTANYL CITRATE 25 MCG: 50 INJECTION INTRAMUSCULAR; INTRAVENOUS at 11:42

## 2020-02-12 RX ADMIN — DEXAMETHASONE SODIUM PHOSPHATE 10 MG: 4 INJECTION, SOLUTION INTRAMUSCULAR; INTRAVENOUS at 11:55

## 2020-02-12 RX ADMIN — DOCUSATE SODIUM 100 MG: 100 CAPSULE, LIQUID FILLED ORAL at 20:02

## 2020-02-12 RX ADMIN — INSULIN LISPRO 1 UNITS: 100 INJECTION, SOLUTION INTRAVENOUS; SUBCUTANEOUS at 22:45

## 2020-02-12 RX ADMIN — ACETAMINOPHEN 650 MG: 325 TABLET ORAL at 20:03

## 2020-02-12 RX ADMIN — SODIUM CHLORIDE: 9 INJECTION, SOLUTION INTRAVENOUS at 09:52

## 2020-02-12 RX ADMIN — METFORMIN HYDROCHLORIDE 500 MG: 500 TABLET ORAL at 16:24

## 2020-02-12 RX ADMIN — MIDAZOLAM 2 MG: 1 INJECTION INTRAMUSCULAR; INTRAVENOUS at 11:32

## 2020-02-12 RX ADMIN — OXYCODONE HYDROCHLORIDE 10 MG: 10 TABLET ORAL at 22:45

## 2020-02-12 RX ADMIN — SODIUM CHLORIDE: 4.5 INJECTION, SOLUTION INTRAVENOUS at 14:25

## 2020-02-12 ASSESSMENT — PULMONARY FUNCTION TESTS
PIF_VALUE: 16
PIF_VALUE: 4
PIF_VALUE: 15
PIF_VALUE: 1
PIF_VALUE: 0
PIF_VALUE: 1
PIF_VALUE: 4
PIF_VALUE: 1
PIF_VALUE: 16
PIF_VALUE: 4
PIF_VALUE: 17
PIF_VALUE: 16
PIF_VALUE: 1
PIF_VALUE: 17
PIF_VALUE: 3
PIF_VALUE: 4
PIF_VALUE: 15
PIF_VALUE: 0
PIF_VALUE: 0
PIF_VALUE: 16
PIF_VALUE: 4
PIF_VALUE: 23
PIF_VALUE: 15
PIF_VALUE: 15
PIF_VALUE: 5
PIF_VALUE: 16
PIF_VALUE: 16
PIF_VALUE: 4
PIF_VALUE: 4
PIF_VALUE: 15
PIF_VALUE: 1
PIF_VALUE: 4
PIF_VALUE: 15
PIF_VALUE: 4
PIF_VALUE: 3
PIF_VALUE: 4
PIF_VALUE: 15
PIF_VALUE: 3
PIF_VALUE: 16
PIF_VALUE: 1
PIF_VALUE: 16
PIF_VALUE: 1
PIF_VALUE: 15
PIF_VALUE: 1
PIF_VALUE: 1
PIF_VALUE: 15
PIF_VALUE: 15
PIF_VALUE: 1
PIF_VALUE: 4
PIF_VALUE: 15
PIF_VALUE: 5
PIF_VALUE: 1
PIF_VALUE: 15
PIF_VALUE: 16
PIF_VALUE: 15
PIF_VALUE: 16
PIF_VALUE: 4
PIF_VALUE: 0
PIF_VALUE: 1
PIF_VALUE: 4
PIF_VALUE: 1
PIF_VALUE: 5
PIF_VALUE: 15
PIF_VALUE: 5
PIF_VALUE: 15
PIF_VALUE: 3
PIF_VALUE: 1
PIF_VALUE: 15
PIF_VALUE: 1
PIF_VALUE: 1
PIF_VALUE: 3
PIF_VALUE: 1
PIF_VALUE: 1
PIF_VALUE: 4
PIF_VALUE: 19
PIF_VALUE: 17
PIF_VALUE: 2
PIF_VALUE: 15
PIF_VALUE: 1
PIF_VALUE: 0
PIF_VALUE: 15
PIF_VALUE: 1
PIF_VALUE: 7
PIF_VALUE: 15
PIF_VALUE: 0
PIF_VALUE: 15

## 2020-02-12 ASSESSMENT — PAIN - FUNCTIONAL ASSESSMENT
PAIN_FUNCTIONAL_ASSESSMENT: PREVENTS OR INTERFERES SOME ACTIVE ACTIVITIES AND ADLS
PAIN_FUNCTIONAL_ASSESSMENT: PREVENTS OR INTERFERES WITH ALL ACTIVE AND SOME PASSIVE ACTIVITIES
PAIN_FUNCTIONAL_ASSESSMENT: PREVENTS OR INTERFERES SOME ACTIVE ACTIVITIES AND ADLS
PAIN_FUNCTIONAL_ASSESSMENT: 0-10
PAIN_FUNCTIONAL_ASSESSMENT: PREVENTS OR INTERFERES SOME ACTIVE ACTIVITIES AND ADLS
PAIN_FUNCTIONAL_ASSESSMENT: PREVENTS OR INTERFERES SOME ACTIVE ACTIVITIES AND ADLS

## 2020-02-12 ASSESSMENT — PAIN DESCRIPTION - ONSET
ONSET: ON-GOING

## 2020-02-12 ASSESSMENT — PAIN DESCRIPTION - ORIENTATION
ORIENTATION: RIGHT

## 2020-02-12 ASSESSMENT — PAIN SCALES - GENERAL
PAINLEVEL_OUTOF10: 6
PAINLEVEL_OUTOF10: 9
PAINLEVEL_OUTOF10: 7
PAINLEVEL_OUTOF10: 0
PAINLEVEL_OUTOF10: 0
PAINLEVEL_OUTOF10: 9
PAINLEVEL_OUTOF10: 10
PAINLEVEL_OUTOF10: 0
PAINLEVEL_OUTOF10: 9
PAINLEVEL_OUTOF10: 7
PAINLEVEL_OUTOF10: 8
PAINLEVEL_OUTOF10: 6
PAINLEVEL_OUTOF10: 8
PAINLEVEL_OUTOF10: 4

## 2020-02-12 ASSESSMENT — PAIN DESCRIPTION - FREQUENCY
FREQUENCY: INTERMITTENT

## 2020-02-12 ASSESSMENT — PAIN DESCRIPTION - DESCRIPTORS
DESCRIPTORS: ACHING

## 2020-02-12 ASSESSMENT — PAIN DESCRIPTION - PROGRESSION
CLINICAL_PROGRESSION: NOT CHANGED

## 2020-02-12 ASSESSMENT — PAIN DESCRIPTION - LOCATION
LOCATION: KNEE

## 2020-02-12 ASSESSMENT — PAIN DESCRIPTION - PAIN TYPE
TYPE: ACUTE PAIN

## 2020-02-12 ASSESSMENT — ENCOUNTER SYMPTOMS: SHORTNESS OF BREATH: 0

## 2020-02-12 NOTE — ANESTHESIA PRE PROCEDURE
02/10/20 (!) 130/90   12/12/19 (!) 145/95       NPO Status: Time of last liquid consumption: 0500(states drank 12 oz gatorade)                        Time of last solid consumption: 0000                        Date of last liquid consumption: 02/12/20                        Date of last solid food consumption: 02/12/20    BMI:   Wt Readings from Last 3 Encounters:   02/12/20 184 lb 15.5 oz (83.9 kg)   02/10/20 188 lb 12.8 oz (85.6 kg)   12/12/19 199 lb (90.3 kg)     Body mass index is 34.95 kg/m².     CBC:   Lab Results   Component Value Date    WBC 7.5 01/29/2020    RBC 4.65 01/29/2020    HGB 12.6 01/29/2020    HCT 39.4 01/29/2020    MCV 84.8 01/29/2020    RDW 16.3 01/29/2020     01/29/2020       CMP:   Lab Results   Component Value Date     01/29/2020    K 4.1 01/29/2020     01/29/2020    CO2 26 01/29/2020    BUN 9 01/29/2020    CREATININE 0.6 01/29/2020    GFRAA >60 01/29/2020    GFRAA >60 09/07/2012    AGRATIO 1.4 09/07/2012    LABGLOM >60 01/29/2020    GLUCOSE 105 01/29/2020    PROT 7.4 04/05/2019    PROT 7.6 09/07/2012    CALCIUM 9.4 01/29/2020    BILITOT 0.4 04/05/2019    ALKPHOS 72 04/05/2019    AST 11 04/05/2019    ALT 15 04/05/2019       POC Tests:   Recent Labs     02/12/20  1001   POCGLU 115*       Coags:   Lab Results   Component Value Date    PROTIME 11.2 01/29/2020    INR 0.97 01/29/2020    APTT 36.7 01/29/2020       HCG (If Applicable):   Lab Results   Component Value Date    PREGTESTUR Negative 07/10/2014        ABGs: No results found for: PHART, PO2ART, IFS1VUX, OJQ0UDG, BEART, G3GAMPIY     Type & Screen (If Applicable):  No results found for: [facility]    Anesthesia Evaluation  Patient summary reviewed  Airway: Mallampati: III  TM distance: >3 FB   Neck ROM: full  Mouth opening: > = 3 FB Dental:          Pulmonary:   (+) asthma:     (-) COPD and shortness of breath                           Cardiovascular:    (+) hypertension:, hyperlipidemia    (-) valvular

## 2020-02-12 NOTE — ANESTHESIA POSTPROCEDURE EVALUATION
Department of Anesthesiology  Postprocedure Note    Patient: Josette Elena  MRN: [id number]  YOB: 1960  Date of evaluation: 2/12/2020  Time:  2:08 PM     Procedure Summary     Date:  02/12/20 Room / Location:  [address] [facility]    Anesthesia Start:  5315 Anesthesia Stop:  2534    Procedure:  RIGHT TOTAL KNEE REPLACEMENT ROBOTIC ASSISTED (Right ) Diagnosis:  (RIGHT ARTHRITIS KNEE)    Surgeon:  Neville Menon MD Responsible Provider:  Selena Medellin MD    Anesthesia Type:  spinal ASA Status:  3          Anesthesia Type: spinal    Jeremie Phase I: Jeremie Score: 8    Jeremie Phase II:      Last vitals: Reviewed and per EMR flowsheets.        Anesthesia Post Evaluation    Patient location during evaluation: PACU  Level of consciousness: awake and alert  Airway patency: patent  Nausea & Vomiting: no nausea and no vomiting  Complications: no  Cardiovascular status: blood pressure returned to baseline  Respiratory status: acceptable  Hydration status: euvolemic  Comments: Postoperative Anesthesia Note    Name:    Josette Elena  MRN:      [id number]    Patient Vitals in the past 12 hrs:  02/12/20 1404, BP:121/76, Temp:97.6 °F (36.4 °C), Temp src:Oral, Pulse:84, Resp:18, SpO2:95 %  02/12/20 1330, BP:139/82, Pulse:86, Resp:12, SpO2:98 %  02/12/20 1328, Temp:97 °F (36.1 °C), Temp src:Temporal  02/12/20 1325, BP:(!) 127/92, Pulse:83, Resp:13, SpO2:91 %  02/12/20 1320, Pulse:90, Resp:18, SpO2:92 %  02/12/20 1315, BP:(!) 82/55, Pulse:89, Resp:15, SpO2:90 %  02/12/20 1310, Pulse:90, Resp:14, SpO2:98 %  02/12/20 1305, BP:(!) 113/93, Pulse:90, Resp:14, SpO2:94 %  02/12/20 1302, BP:(!) 113/93, Temp:96.8 °F (36 °C), Temp src:Temporal, Pulse:88, Resp:25, SpO2:95 %  02/12/20 0942, BP:(!) 148/82, Temp:98 °F (36.7 °C), Temp src:Temporal, Pulse:82, Resp:14, SpO2:96 %, Height:5' 1\" (1.549 m), Weight:184 lb 15.5 oz (83.9 kg)     LABS:    CBC  Lab Results       Component                Value

## 2020-02-12 NOTE — PROGRESS NOTES
WC  0.45 % sodium chloride infusion, , Intravenous, Continuous  sodium chloride flush 0.9 % injection 10 mL, 10 mL, Intravenous, 2 times per day  sodium chloride flush 0.9 % injection 10 mL, 10 mL, Intravenous, PRN  ceFAZolin (ANCEF) 2 g in dextrose 5 % 100 mL IVPB, 2 g, Intravenous, Q8H  acetaminophen (TYLENOL) tablet 650 mg, 650 mg, Oral, Q6H  oxyCODONE (ROXICODONE) immediate release tablet 5 mg, 5 mg, Oral, Q4H PRN **OR** oxyCODONE HCl (OXY-IR) immediate release tablet 10 mg, 10 mg, Oral, Q4H PRN  morphine (PF) injection 2 mg, 2 mg, Intravenous, Q3H PRN  docusate sodium (COLACE) capsule 100 mg, 100 mg, Oral, BID  sennosides-docusate sodium (SENOKOT-S) 8.6-50 MG tablet 1 tablet, 1 tablet, Oral, BID  magnesium hydroxide (MILK OF MAGNESIA) 400 MG/5ML suspension 30 mL, 30 mL, Oral, Daily PRN  ondansetron (ZOFRAN) injection 4 mg, 4 mg, Intravenous, Q6H PRN  [START ON 2/13/2020] enoxaparin (LOVENOX) injection 30 mg, 30 mg, Subcutaneous, BID  insulin lispro (HUMALOG) injection vial 0-6 Units, 0-6 Units, Subcutaneous, TID WC  insulin lispro (HUMALOG) injection vial 0-3 Units, 0-3 Units, Subcutaneous, Nightly  glucose (GLUTOSE) 40 % oral gel 15 g, 15 g, Oral, PRN  dextrose 50 % IV solution, 12.5 g, Intravenous, PRN  glucagon (rDNA) injection 1 mg, 1 mg, Intramuscular, PRN  dextrose 5 % solution, 100 mL/hr, Intravenous, PRN  [START ON 2/13/2020] celecoxib (CELEBREX) capsule 200 mg, 200 mg, Oral, Q24H  [START ON 2/13/2020] influenza quadrivalent split vaccine (FLUZONE;FLUARIX;FLULAVAL;AFLURIA) injection 0.5 mL, 0.5 mL, Intramuscular, Once    ASSESSMENT AND PLAN      Post right TKA, stable exam  DVT prophylaxis ordered, Lovenox as inpt then switch to ASA 81mg twice at day for 14 days for DVT prophylaxis  PT OT for ADL's and ambulation as tolerated  SS for DC planning, home with home care tomorrow  IV or PO pain med as ordered    [facility]  2/12/2020  2:23 PM

## 2020-02-12 NOTE — PLAN OF CARE
Problem: Falls - Risk of:  Goal: Will remain free from falls  Description  Will remain free from falls  Outcome: Ongoing  Note:   Fall risk assessment completed . Fall precautions in place, bed alarm on, side rails 2/4 up, call light in reach, educated pt on calling for assistance when needed, room clear of clutter. Pt verbalized understanding. Problem: Pain:  Goal: Pain level will decrease  Description  Pain level will decrease  Outcome: Ongoing  Note:   Pain /discomfort being managed with PRN analgesics per MD orders. Patient able to express presence and absence of pain and rate pain appropriately using numerical scale. Problem: Skin Integrity:  Goal: Will show no infection signs and symptoms  Description  Will show no infection signs and symptoms  Outcome: Ongoing  Note:   Patient is alert and oriented, afebrile, has manageable complaints of pain, skin is intact and appropriate for ethnicity in color    Goal: Absence of new skin breakdown  Description  Absence of new skin breakdown  Outcome: Ongoing  Note:   Enoch score assessed. Patient able to ambulate and turn self and repositioned patient Q2H and assessed skin. Educated patient on importance of repositioning to prevent skin issues.

## 2020-02-12 NOTE — PROGRESS NOTES
son stated that he will be staying with pt and providing 24 hour assist for \"the first few days\" following d/c. PT recommends continued skilled therapy, 2-3 sessions per week S1 level home care, with 24 hour assist initially progressing to assist PRN. Continue to see while in hopsital.   Treatment Diagnosis: RA R TKA   Prognosis: Good  Decision Making: Medium Complexity  PT Education: Goals;PT Role;Plan of Care;Transfer Training;Gait Training;Functional Mobility Training;General Safety; Family Education;Weight-bearing Education  REQUIRES PT FOLLOW UP: Yes  Activity Tolerance  Activity Tolerance: Patient limited by pain; Patient limited by fatigue       Patient Diagnosis(es): The encounter diagnosis was Arthritis of right knee. has a past medical history of Allergic rhinitis, Arthritis, Asthma, Essential hypertension, Hyperlipidemia, Neuropathy, Osteoarthritis, Type 2 diabetes mellitus without complication, without long-term current use of insulin ([address] and Wears glasses. has a past surgical history that includes Foot surgery (Right); Carpal tunnel release (Right, 7-10-14); Knee arthroscopy (Right); and meniscectomy (Right). Restrictions  Restrictions/Precautions  Restrictions/Precautions: Weight Bearing, Fall Risk  Lower Extremity Weight Bearing Restrictions  Right Lower Extremity Weight Bearing: Weight Bearing As Tolerated     Vision/Hearing  Vision: Impaired  Vision Exceptions: Wears glasses for reading  Hearing: Within functional limits       Subjective  General  Chart Reviewed: Yes  Patient assessed for rehabilitation services?: Yes  Additional Pertinent Hx: 63 yo male to hospital 2- for elective robot assisted R TKR via Dr. Tish Webster. PMHx as noted, including DM, neuropathy.   Response To Previous Treatment: Not applicable  Family / Caregiver Present: Yes(son)  Referring Practitioner: Dr. Tish Webster  Referral Date : 02/12/20  Diagnosis: R TKA   Follows Commands: Within Functional Limits  General Comment  Comments: Supine in bed upon arrival. Agreeable to PT  Subjective  Subjective: Pain currently 10/10 supine and with activity   Pain Screening  Patient Currently in Pain: (Reporting moderate pain)     Orientation  Orientation  Overall Orientation Status: Within Functional Limits     Social/Functional History  Social/Functional History  Lives With: Alone(son and dtr will stay with patient and assist at DC home.)  Type of Home: Apartment(basement apt)  Home Layout: One level  Home Access: Stairs to enter with rails(??)  Entrance Stairs - Number of Steps: 8 down to enter   Entrance Stairs - Rails: Right(???)  Bathroom Shower/Tub: Tub/Shower unit, Shower chair with back  Bathroom Toilet: Standard  Bathroom Equipment: Grab bars in shower  Bathroom Accessibility: Not accessible  Home Equipment: Standard walker, Lift chair, Cane  ADL Assistance: Independent  Homemaking Assistance: Independent  Ambulation Assistance: Independent(cane in apt prn, at all times in community)  Transfer Assistance: Independent  Active : No    Objective  Observation/Palpation  Observation: Pt currently hasa R LE ACE wrapped   AROM RLE (degrees)  RLE General AROM: Unable to formally assess due to ACE wrap. ~20 to 65 degrees of R knee flexion   AROM LLE (degrees)  LLE AROM : WFL  Strength RLE  Comment: Unable to formally assess due to pain and ACE wrap. Functionally 3/5  Strength LLE  Strength LLE: WFL  Sensation  Overall Sensation Status: Impaired(Pt stated RLE feels \"different\" than the left.  Pt stated  RLE feel \"sort of numb\" compared to LLE)  Bed mobility  Supine to Sit: Contact guard assistance  Sit to Supine: Unable to assess(Pt seated in recliner at end of session )  Scooting: Contact guard assistance  Transfers  Sit to Stand: Contact guard assistance(EOB/toilet to RW )  Stand to sit: Contact guard assistance(RW to toilet/recliner )  Ambulation  Ambulation?: Yes  WB Status: WBAT RLE   Ambulation 1  Surface: level tile  Device:

## 2020-02-12 NOTE — DISCHARGE INSTR - COC
[facility] ([facility]) Continuity of Care Form    Patient Name:  Margo Libman  : 1960    MRN:  [id number]    Admit date:  2020  Discharge date:  20    Code Status Order: No Order  Advance Directives: No    Admitting Physician: Gurjit Reyes MD  PCP: Dorothy Alcantar MD    Discharging Nurse: [employer] [facility] [place] Unit/Room#: OR/NONE  Discharging Unit Phone Number: [phone]    Emergency Contact:        Past Surgical History:  Past Surgical History:   Procedure Laterality Date    CARPAL TUNNEL RELEASE Right 7-10-14    FOOT SURGERY Right     rods placed    KNEE ARTHROSCOPY Right     MENISCECTOMY Right        Immunization History:   Immunization History   Administered Date(s) Administered    Hepatitis B Adult (Engerix-B) 2019    Hepatitis B Adult (Recombivax HB) 2019    Pneumococcal Polysaccharide (Tlhuizctq58) 2017    Tdap (Boostrix, Adacel) 2013       Active Problems:  Principal Problem:    Arthritis of right knee  Resolved Problems:    * No resolved hospital problems. *      Isolation/Infection:       Nurse Assessment:  Last Vital Signs:/82   Pulse 86   Temp 97 °F (36.1 °C) (Temporal)   Resp 12   Ht 5' 1\" (1.549 m)   Wt 184 lb 15.5 oz (83.9 kg)   LMP 2014   SpO2 98%   BMI 34.95 kg/m²   Last documented pain score (0-10 scale): Pain Level: 0  Last Weight:   Wt Readings from Last 1 Encounters:   20 184 lb 15.5 oz (83.9 kg)     Mental Status:  oriented and alert     IV Access:  - None    Nursing Mobility/ADLs:  Walking   Assisted  Transfer  Assisted  Bathing  Assisted  Dressing  Assisted  Toileting  Assisted  Feeding  Independent  Med Admin  Independent  Med Delivery   whole    Wound Care Documentation and Therapy:  Keep glued Prineo dressing clean and intact. DO NOT remove. Prineo is waterproof for showering.  Doctor will evaluated dressing for removal at office visit 2 weeks after surgery  Incision 07/10/14 Wrist Right (Active) patient):  Jonathan ALCAZAR SIGNATURE:  Electronically signed by Silverio Prather RN on 2/12/20 at 5:35 PM    PHYSICIAN SECTION    Prognosis: Good    Condition at Discharge: Stable    Rehab Potential (if transferring to Rehab): Good    Physician Certification: I certify the above orders, information, and transfer of Jayden Lawson is necessary for the continuing treatment of the diagnosis listed and that he requires [address] for less 30 days. Update Admission H&P: No change in H&P    PHYSICIAN SIGNATURE:  Electronically signed by DIANA Graf CNP on 2/12/20 at 1:36 PM/ Dr Tim Holloway Status Date: ***    [facility] Readmission Risk Assessment Score:    Discharging to Facility/ Agency   Name:  [facility]    Address: [address] [place] [address], [address] [place] [address].[place] [address]  Phone: [phone]  Fax: [phone]    ·     / signature: {Esignature:[id number]}          Followup with Dr Joselyn Payton 2 weeks after surgery in office   [phone]    DISCHARGE INSTRUCTIONS FOR TOTAL KNEE REPLACEMENT  Activity:   Elevate your leg if swelling occurs in your ankle. Use elastic wraps/hose until swelling decreases.  Continue the exercise program as prescribed by physical therapists.  Take frequent walks.  Use walker, crutches, or cane with weight bearing instructions as indicated by the physical therapists.  Take rest periods often. Elevate leg during rest period. Wound Care:   Cover the wound with a sterile gauze dressing and change daily as long as there is drainage.  Do not scrub wound. Pat it dry with a soft towel.  Dont apply any lotions or creams to your wound.  Check the incision every day for redness, swelling, or increase in drainage. Diet:   You can resume your normal diet. There are no limits on your diet due to your surgery.  Pain pills and activity changes may lead to constipation.   To prevent this, use prune

## 2020-02-12 NOTE — PROGRESS NOTES
Bearing, Fall Risk  Lower Extremity Weight Bearing Restrictions  Right Lower Extremity Weight Bearing: Weight Bearing As Tolerated    Subjective   General  Chart Reviewed: Yes  Patient assessed for rehabilitation services?: Yes  Additional Pertinent Hx: Pt is 64 y.o. F who presents with arthritis of R knee. Pt is s/p R TKA and is WBAT. PMH: DM, neuropathy  Family / Caregiver Present: Yes(Son)  Referring Practitioner: Shaq Chapman MD   Diagnosis: Arthritis of R knee  Subjective  Subjective: Pt met bedside, agreeable for therapy evaluation and OOB activity. Pt drowsy throughout session - minimally opened eyes.    Patient Currently in Pain: (Reporting moderate pain)  Pain Assessment  Pain Assessment: 0-10  Pain Level: 4  Patient's Stated Pain Goal: 4  Pain Type: Acute pain  Pain Location: Knee  Pain Orientation: Right  Pain Radiating Towards:    Pain Descriptors: Aching  Pain Frequency: Intermittent  Pain Onset: On-going  Clinical Progression: Not changed  Functional Pain Assessment: Prevents or interferes some active activities and ADLs  Non-Pharmaceutical Pain Intervention(s): Rest  Response to Pain Intervention: Patient Satisfied  Vital Signs  Temp: 97.6 °F (36.4 °C)  Temp Source: Oral  Pulse: 84  Heart Rate Source: Monitor  Resp: 18  BP: 121/76  BP Location: Left Arm  BP Upper/Lower: Upper  MAP (mmHg): 91  Level of Consciousness: Alert  MEWS Score: 1  Patient Currently in Pain: (Reporting moderate pain)  Height and Weight  Height: 5' 1\" (154.9 cm)  Weight: 184 lb 15.5 oz (83.9 kg)  Weight Method: Stated  BSA (Calculated - sq m): 1.9 sq meters  BMI (Calculated): 35  Oxygen Therapy  SpO2: 95 %  O2 Device: Nasal cannula     Social/Functional History  Social/Functional History  Lives With: Alone(son and dtr will stay with patient and assist at DC home.)  Type of Home: Apartment(basement apt)  Home Layout: One level  Home Access: Stairs to enter with rails(??)  Entrance Stairs - Number of Steps: 8 down to enter   Entrance Stairs - Rails: Right(???)  Bathroom Shower/Tub: Tub/Shower unit, Shower chair with back  Bathroom Toilet: Standard  Bathroom Equipment: Grab bars in shower  Bathroom Accessibility: Not accessible  Home Equipment: Standard walker, Lift chair, Cane  ADL Assistance: Independent  Homemaking Assistance: Independent  Ambulation Assistance: Independent(cane in apt prn, at all times in community)  Transfer Assistance: Independent  Active : No       Objective   Vision: Impaired  Vision Exceptions: Wears glasses for reading  Hearing: Within functional limits      Orientation  Overall Orientation Status: Within Functional Limits     Observation/Palpation  Observation: Pt currently hasa R LE ACE wrapped      Balance  Sitting Balance: Stand by assistance  Standing Balance: Contact guard assistance  Standing Balance  Time: ~3 minutes   Activity: Func mob, transfers, and ADL tasks     Functional Mobility  Functional - Mobility Device: Rolling Walker  Activity: To/from bathroom  Assist Level: Contact guard assistance  Functional Mobility Comments: Pt completed functional mobility with RW with CGA, steady with no overt LOB. Pt slow pace and bearing weight. Toilet Transfers  Toilet - Technique: Ambulating(RW)  Equipment Used: Standard bedside commode(elevated over commode)  Toilet Transfer: Contact guard assistance  Toilet Transfers Comments: CGA for sit <> stand     ADL  Grooming: Contact guard assistance(Pt washed hands in stance at sink with CGA)  Toileting: Contact guard assistance(Pt managed clothing with CGA - no pericare needed, unable to saturate )  Additional Comments: PTA pt reports independence in self-care tasks. Anticipate pt to require min A for LB ADLs, SBA for UB ADLs, CGA for toileting.       Tone RUE  RUE Tone: Normotonic  Tone LUE  LUE Tone: Normotonic  Coordination  Movements Are Fluid And Coordinated: Yes     Bed mobility  Supine to Sit: Contact guard assistance  Sit to Supine: Unable to assess(Pt 4: Pt will complete toileting with SBA  Short term goal 5: Pt will complete grooming in stance at sink with SBA   Patient Goals   Patient goals : \"to go home\"       Therapy Time   Individual Concurrent Group Co-treatment   Time In       1510   Time Out       1540   Minutes       30   Timed Code Treatment Minutes: 15 Minutes(15 min eval )     If pt is discharged prior to next OT session, this note will serve as the discharge summary.     RIGOBERTO Mulligan/ROXANA#[id number]

## 2020-02-12 NOTE — CARE COORDINATION
Gisele spoke with patient and confirmed JET Class plan to return home with [address] and the support of her son and daughter. Patient reported that she will need a rolling walker and RTS. [facility]. Gisele made referral to [facility] liaison and [place].      Electronically signed by Denae Doyle on 2/12/2020 at 3:46 PM

## 2020-02-13 VITALS
HEART RATE: 89 BPM | DIASTOLIC BLOOD PRESSURE: 82 MMHG | WEIGHT: 186.95 LBS | RESPIRATION RATE: 17 BRPM | OXYGEN SATURATION: 90 % | SYSTOLIC BLOOD PRESSURE: 122 MMHG | BODY MASS INDEX: 35.3 KG/M2 | HEIGHT: 61 IN | TEMPERATURE: 97.7 F

## 2020-02-13 LAB
ESTIMATED AVERAGE GLUCOSE: 134.1 MG/DL
GLUCOSE BLD-MCNC: 123 MG/DL (ref 70–99)
HBA1C MFR BLD: 6.3 %
HCT VFR BLD CALC: 39.2 % (ref 36–48)
HEMOGLOBIN: 12.5 G/DL (ref 12–16)
PERFORMED ON: ABNORMAL

## 2020-02-13 PROCEDURE — 97110 THERAPEUTIC EXERCISES: CPT

## 2020-02-13 PROCEDURE — 85014 HEMATOCRIT: CPT

## 2020-02-13 PROCEDURE — 97535 SELF CARE MNGMENT TRAINING: CPT

## 2020-02-13 PROCEDURE — 6370000000 HC RX 637 (ALT 250 FOR IP): Performed by: NURSE PRACTITIONER

## 2020-02-13 PROCEDURE — 6370000000 HC RX 637 (ALT 250 FOR IP): Performed by: ORTHOPAEDIC SURGERY

## 2020-02-13 PROCEDURE — 97530 THERAPEUTIC ACTIVITIES: CPT

## 2020-02-13 PROCEDURE — 94760 N-INVAS EAR/PLS OXIMETRY 1: CPT

## 2020-02-13 PROCEDURE — 36415 COLL VENOUS BLD VENIPUNCTURE: CPT

## 2020-02-13 PROCEDURE — 2580000003 HC RX 258: Performed by: ORTHOPAEDIC SURGERY

## 2020-02-13 PROCEDURE — 85018 HEMOGLOBIN: CPT

## 2020-02-13 PROCEDURE — 6360000002 HC RX W HCPCS: Performed by: ORTHOPAEDIC SURGERY

## 2020-02-13 PROCEDURE — 97116 GAIT TRAINING THERAPY: CPT

## 2020-02-13 RX ADMIN — CELECOXIB 200 MG: 200 CAPSULE ORAL at 05:05

## 2020-02-13 RX ADMIN — CHLORTHALIDONE 25 MG: 25 TABLET ORAL at 09:56

## 2020-02-13 RX ADMIN — CEFAZOLIN 2 G: 1 INJECTION, POWDER, FOR SOLUTION INTRAMUSCULAR; INTRAVENOUS at 03:43

## 2020-02-13 RX ADMIN — ACETAMINOPHEN 650 MG: 325 TABLET ORAL at 09:55

## 2020-02-13 RX ADMIN — SENNOSIDES AND DOCUSATE SODIUM 1 TABLET: 8.6; 5 TABLET ORAL at 09:59

## 2020-02-13 RX ADMIN — ENOXAPARIN SODIUM 30 MG: 30 INJECTION SUBCUTANEOUS at 09:57

## 2020-02-13 RX ADMIN — SODIUM CHLORIDE: 4.5 INJECTION, SOLUTION INTRAVENOUS at 00:05

## 2020-02-13 RX ADMIN — DOCUSATE SODIUM 100 MG: 100 CAPSULE, LIQUID FILLED ORAL at 09:55

## 2020-02-13 RX ADMIN — ACETAMINOPHEN 650 MG: 325 TABLET ORAL at 03:43

## 2020-02-13 RX ADMIN — OXYCODONE HYDROCHLORIDE 10 MG: 10 TABLET ORAL at 08:00

## 2020-02-13 RX ADMIN — AMLODIPINE BESYLATE 10 MG: 10 TABLET ORAL at 09:56

## 2020-02-13 RX ADMIN — ATORVASTATIN CALCIUM 40 MG: 40 TABLET, FILM COATED ORAL at 09:55

## 2020-02-13 RX ADMIN — OXYCODONE HYDROCHLORIDE 10 MG: 10 TABLET ORAL at 03:43

## 2020-02-13 RX ADMIN — MORPHINE SULFATE 2 MG: 2 INJECTION, SOLUTION INTRAMUSCULAR; INTRAVENOUS at 04:57

## 2020-02-13 ASSESSMENT — PAIN DESCRIPTION - PAIN TYPE
TYPE: ACUTE PAIN;SURGICAL PAIN
TYPE: SURGICAL PAIN;ACUTE PAIN
TYPE: ACUTE PAIN

## 2020-02-13 ASSESSMENT — PAIN DESCRIPTION - FREQUENCY
FREQUENCY: INTERMITTENT
FREQUENCY: CONTINUOUS
FREQUENCY: INTERMITTENT

## 2020-02-13 ASSESSMENT — PAIN DESCRIPTION - ORIENTATION
ORIENTATION: RIGHT

## 2020-02-13 ASSESSMENT — PAIN DESCRIPTION - ONSET
ONSET: ON-GOING

## 2020-02-13 ASSESSMENT — PAIN DESCRIPTION - LOCATION
LOCATION: KNEE
LOCATION: LEG;KNEE

## 2020-02-13 ASSESSMENT — PAIN DESCRIPTION - PROGRESSION
CLINICAL_PROGRESSION: NOT CHANGED
CLINICAL_PROGRESSION: GRADUALLY WORSENING
CLINICAL_PROGRESSION: NOT CHANGED

## 2020-02-13 ASSESSMENT — PAIN SCALES - GENERAL
PAINLEVEL_OUTOF10: 8
PAINLEVEL_OUTOF10: 8
PAINLEVEL_OUTOF10: 7
PAINLEVEL_OUTOF10: 8
PAINLEVEL_OUTOF10: 8

## 2020-02-13 ASSESSMENT — PAIN SCALES - WONG BAKER
WONGBAKER_NUMERICALRESPONSE: 0
WONGBAKER_NUMERICALRESPONSE: 0

## 2020-02-13 ASSESSMENT — PAIN DESCRIPTION - DESCRIPTORS
DESCRIPTORS: ACHING

## 2020-02-13 ASSESSMENT — PAIN DESCRIPTION - DIRECTION: RADIATING_TOWARDS: THIGH

## 2020-02-13 NOTE — CARE COORDINATION
[facility] rep delivered requested Al 2-Wheeled Alric Human to patient and reviewed insurance coverage and equipment set up with patient. Patient states she has a toilet riser at home with no arms. Patient elected to purchase a Toilet Safety Frame. The TSF was delivered and setup instructions reviewed with patient. ANGELIC MOHAN RN, RN & OT aware.     Thank you for the referral.  Electronically signed by Nikki Baires on 2/13/2020 at 10:04 AM Cell ph# [phone]

## 2020-02-13 NOTE — OP NOTE
aerial.    Once this was done, the knee was taken through range of motion and found  to be reasonably well balance, however, somewhat tight in flexion. Virtual manipulation of the implant on the robotic screen was done to  balance flexion and extension gaps. Once this had happened then, the  robotic cutting arm was brought in, and in the order of posterior  femoral condyle, anterior femoral condyle, anterior femoral chamfer,  tibia, posterior femoral chamfer, and then distal femoral condylar cuts  were made. All bony fragments were removed. The knee was reconstructed for a 2  tibia and a 4 cruciate-retaining femur along with a 9-mm poly and a  31-mm patella. The knee was taken through a range of motion, confirmed  using robotic confirmation that we had balanced the knee in full  extension and she had excellent flexion, near calf-on-thigh without any  issues. The patella tracked well. Lateral release was not performed. All trial implants were removed out of the knee and then the ends of the  bones were irrigated off and dried. The proximal tibia was exposed and  then in the order of tibia, femur and patella, the #2 tibial tray was  hammered into place, #3 femoral component [place] [employer]  cruciate-retaining knee was hammered into place and then finally the  31-mm uncemented patella was squeezed into place. The 9-mm poly was  trialed and then a real 9-mm was snapped into place. The knee came to  full extension, excellent flexion, and good overall stability. The  patella tracked well. The wound was irrigated out. Hemostasis was obtained. The wound was  injected with 100 mL of ropivacaine, morphine, cefuroxime, and  methylprednisolone. It was bathed with 3 gm of tranexamic acid for 5  minutes and then finally lavaged with aqueous iodine. The wound was  closed in layers. The patient was brought to recovery room in stable  condition.         Otto Ordonez MD    D: 02/12/2020 12:54:23       T: 02/12/2020 13:50:31     [email or web address]  Job#: [id number]     Doc#: [id number]    CC:

## 2020-02-13 NOTE — PLAN OF CARE
Problem: Falls - Risk of:  Goal: Will remain free from falls  Description  Will remain free from falls  2/12/2020 1942 by Camryn Quiroga RN  Outcome: Ongoing  Note:   Fall risk assessment completed . Fall precautions in place, bedalarm on, side rails 2/4 up, call light in reach, educated pt on calling for assistance when needed, room clear of clutter. Pt verbalized understanding. 2/12/2020 1413 by Camryn Quiroga RN  Outcome: Ongoing  Note:   Fall risk assessment completed . Fall precautions in place, bed alarm on, side rails 2/4 up, call light in reach, educated pt on calling for assistance when needed, room clear of clutter. Pt verbalized understanding. Problem: Pain:  Goal: Pain level will decrease  Description  Pain level will decrease  2/12/2020 1942 by Camryn Quiroga RN  Outcome: Ongoing  Note:   Pain /discomfort being managed with PRN analgesics per MD orders. Patient able to express presence and absence of pain and rate pain appropriately using numerical scale. 2/12/2020 1413 by Camryn Quiroga RN  Outcome: Ongoing  Note:   Pain /discomfort being managed with PRN analgesics per MD orders. Patient able to express presence and absence of pain and rate pain appropriately using numerical scale.         Problem: Skin Integrity:  Goal: Will show no infection signs and symptoms  Description  Will show no infection signs and symptoms  2/12/2020 1942 by Camryn Quiroga RN  Outcome: Ongoing  Note:   Patient is alert and oriented, afebrile, has no complaints of pain, skin is intact and appropriate for ethnicity in color    2/12/2020 1413 by Camryn Quiroga RN  Outcome: Ongoing  Note:   Patient is alert and oriented, afebrile, has manageable complaints of pain, skin is intact and appropriate for ethnicity in color    Goal: Absence of new skin breakdown  Description  Absence of new skin breakdown  2/12/2020 1942 by Camryn Quiroga RN  Outcome: Ongoing  2/12/2020 1413 by Camryn Quiroga RN  Outcome: Ongoing  Note:   Enoch score assessed. Patient able to ambulate and turn self and repositioned patient Q2H and assessed skin. Educated patient on importance of repositioning to prevent skin issues. Problem: Serum Glucose Level - Abnormal:  Goal: Ability to maintain appropriate glucose levels will improve  Description  Ability to maintain appropriate glucose levels will improve  Outcome: Ongoing  Note:   Patient will have normal glucose levels this shift. Problem: Discharge Planning:  Goal: Discharged to appropriate level of care  Description  Discharged to appropriate level of care  Outcome: Ongoing  Note:   Working with patient, physician, and social work to discharge patient to the appropriate level of care.       Problem: Mobility - Impaired:  Goal: Mobility will improve  Description  Mobility will improve  Outcome: Ongoing     Problem: Infection - Surgical Site:  Goal: Will show no infection signs and symptoms  Description  Will show no infection signs and symptoms  Outcome: Ongoing  Note:   Patient is alert and oriented, afebrile, has manageable complaints of pain, skin is intact and appropriate for ethnicity in color

## 2020-02-13 NOTE — PROGRESS NOTES
[facility] Orthopedic Surgery   Progress Note      S/P :  SUBJECTIVE  In bed. Alert and oriented. . Pain is   described in right knee and with the intensity of moderate. Pain is described as aching. OBJECTIVE              Physical                      VITALS:  /82   Pulse 89   Temp 97.7 °F (36.5 °C) (Oral)   Resp 17   Ht 5' 1\" (1.549 m)   Wt 186 lb 15.2 oz (84.8 kg)   LMP 06/01/2014   SpO2 90%   BMI 35.32 kg/m²                     MUSCULOSKELETAL:  right foot NVI. Wiggles toes to command. Pedal pulses are palpable. NEUROLOGIC:                                  Sensory:  Touch:  Right Lower Extremity:  normal                                                 Surgical wound appears clean and dry right knee with Prineo dressing Ice pad on. FIDEL hose on.      Data       CBC:   Lab Results   Component Value Date    WBC 7.5 01/29/2020    RBC 4.65 01/29/2020    HGB 12.5 02/13/2020    HCT 39.2 02/13/2020    MCV 84.8 01/29/2020    MCH 27.1 01/29/2020    MCHC 32.0 01/29/2020    RDW 16.3 01/29/2020     01/29/2020    MPV 8.8 01/29/2020        WBC:    Lab Results   Component Value Date    WBC 7.5 01/29/2020        Hemoglobin/Hematocrit:    Lab Results   Component Value Date    HGB 12.5 02/13/2020    HCT 39.2 02/13/2020        PT/INR:    Lab Results   Component Value Date    PROTIME 11.2 01/29/2020    INR 0.97 01/29/2020              Current Inpatient Medications             Current Facility-Administered Medications: tranexamic acid (CYKLOKAPRON) 1,000 mg in sodium chloride 0.9 % 50 mL IVPB, 1,000 mg, Intravenous, Once  amLODIPine (NORVASC) tablet 10 mg, 10 mg, Oral, Daily  atorvastatin (LIPITOR) tablet 40 mg, 40 mg, Oral, Daily  chlorthalidone (HYGROTON) tablet 25 mg, 25 mg, Oral, Daily  fluticasone (FLONASE) 50 MCG/ACT nasal spray 1 spray, 1 spray, Nasal, Daily  0.45 % sodium chloride infusion, , Intravenous, Continuous  sodium chloride flush 0.9 % injection 10 mL, 10 mL, Intravenous, 2 times

## 2020-02-13 NOTE — PROGRESS NOTES
Data- discharge order received, patient verbalized agreement to discharge, disposition to previous residence, needs noted for HHC/DME and informed Jade Robles NP. Action- discharge instructions prepared and given to patient and son, patient and son verbalized understanding. Medication information packet given r/t NEW and/or CHANGED prescriptions emphasizing name/purpose/side effects, pt verbalized understanding. Discharge instruction summary: Diet- general, Activity- wbat, Primary Care Physician as follows: Gina Loving -[phone]. f/u appointment with orthopedic office noted below, immunizations reviewed and discussed with patient, prescription medications to be filled by retail pharmacy and then delivered. Inpatient surgical procedure precautions reviewed: . Neurovascular check performed and patient is WNLs, denies numbness/tingling in extremties. Incision site  prineo glue dressing assessed and is  clean,dry, and intact, no signs of redness, drainage, or odor noted. patient's bedside RN yannick notified of patient completing discharge instructions and iv removal. Patient set up with orth[facility]itals and understands when and how to use. Kit provided to patient. Response- IV removed. Medications to be delivered to patient via meds to bed program. Disposition is home with [place] [address] (DME to be delivered to patient by Sudheer Hugo with [facility]), to be transported with family, no complications.      Future Appointments   Date Time Provider Boris Mathur   2/27/2020 10:00 AM MD Vernell Mendenhall   5/26/2020  2:00 PM Melissa Jackman [facility]     Electronically signed by Mendel Lockwood RN on 2/13/2020 at 9:50 AM

## 2020-02-13 NOTE — PLAN OF CARE
Problem: Falls - Risk of:  Goal: Will remain free from falls  Description  Will remain free from falls  2/13/2020 1420 by Osvaldo Matias RN  Outcome: Ongoing  Note:   Fall risk assessment completed. Fall precautions in place. Call light within reach. Pt educated on calling for assistance before getting up. Walkway free of clutter. Will continue to monitor. Electronically signed by Osvaldo Matias RN on 2/13/2020 at 2:20 PM    2/13/2020 1221 by Xenia Pickens  Outcome: Ongoing  Note:   Pt. Uses call light appropriately. Call light within reach. Bed exit in place. Non skid socks on. Fall sign posted. Will continue to monitor and assess. Electronically signed by Xenia Pickens on 2/13/2020 at 12:11 PM       Problem: Pain:  Goal: Pain level will decrease  Description  Pain level will decrease  2/13/2020 1420 by Anusha Nunez RN  Outcome: Ongoing  Note:   Pt assessed for pain. Pt in pain and assessed with 0-10 pain rating scale. Pt given prescribed analgesic for pain. (See eMar) Pt satisfied with pain relief thus far. Will reassess and continue to monitor. Electronically signed by Osvaldo Matias RN on 2/13/2020 at 2:20 PM    2/13/2020 1221 by Xenia Pickens  Outcome: Ongoing  Note:   Pain being treated with PRN pain medication. Will continue to monitor and assess. Electronically signed by Xenia Pickens on 2/13/2020 at 12:13 PM        Problem: Skin Integrity:  Goal: Will show no infection signs and symptoms  Description  Will show no infection signs and symptoms  2/13/2020 1420 by Anusha Nunez RN  Outcome: Ongoing  Note:   No chronic skin issues, will continue to monitor and assess. Electronically signed by Osvaldo Matias RN on 2/13/2020 at 2:21 PM    2/13/2020 1221 by Xenia Pickens  Outcome: Ongoing  Note:   Pt has no chronic skin issues at this time. Surgical incision clean dry and intact. Will continue to monitor and assess.     Electronically signed by Xenia Pickens on

## 2020-02-13 NOTE — CARE COORDINATION
[facility]    Spoke with patient regarding [facility] services. Patient aware and agreeable to services. Demographics verified. Patient will be seen by   2/14/2020. Atilio Peñaloza Faxed orders to [facility]. Electronically signed by Vicente Chou LPN on 5/26/13 at 8:62 AM        [facility] Transition Nurse  [phone]

## 2020-02-13 NOTE — PROGRESS NOTES
Gluteal Sets: 10 per hour   Ankle Pumps: 30 per hour in easy pace  Comments: encouraged to continue practice with the spirometer at home for a few more days  -aarom <10 to ~60 degrees       Plan   Plan  Times per week: 2-3  Current Treatment Recommendations: Functional Mobility Training, Transfer Training, Stair training, Gait Training, Safety Education & Training, Pain Management  Safety Devices  Type of devices:  All fall risk precautions in place, Call light within reach, Chair alarm in place, Left in chair, Nurse notified(Emanuel)    Goals  Short term goals  Time Frame for Short term goals: until d/c   Short term goal 1: Pt will perform all bed mobility with SBA   Short term goal 2: Pt will perform all transfers with SBA with RW  Short term goal 3: Pt will ambulate 48' with SBA and RW   Short term goal 4: Pt will ascend/descend 4 stairs with CGA without AD with 1 hand rail   Patient Goals   Patient goals : Pt did not set goals during session due to fatigue   2-13-20 wants good pain relief and good function       Therapy Time   Individual Concurrent Group Co-treatment   Time In 1030         Time Out 1130         Minutes Mora Chirinos 171, PT

## 2020-02-14 NOTE — DISCHARGE SUMMARY
Physician Discharge Summary     Patient ID:  Olga Lidia Higgins  [id number]  5 y.o.  1960    Admit date: 2/12/2020    Discharge date and time: 2/13/2020 12:45 PM     Admitting Physician: Issac Giron MD     Discharge Physician: Mehul Moreno    Admission Diagnoses: Arthritis of right knee [M17.11]    Discharge Diagnoses: right knee OA    Admission Condition: good    Discharged Condition: good    Indication for Admission: Failed conservative treatment as outpatient for joint pain including PT and pain meds. This patient was then electively scheduled for total joint replacement surgery    Surgical procedure: right TKA    Consults: PT OT SS    This patient had no postoperative complications. They has PT and OT for ADL's . IV and PO pain med for pain control and was eventually DC in stable condition    Treatments: analgesia,  therapies: PT OT,  and surgery      Disposition: home    Patient Instructions:   [unfilled]  Activity: activity as tolerated  Diet: regular diet  Wound Care: keep wound clean and dry    Follow-up with NABIL Hopkins in 2 week.     SignedLanuvia Hall  2/14/2020  1:22 PM

## 2020-02-17 ENCOUNTER — TELEPHONE (OUTPATIENT)
Dept: PRIMARY CARE CLINIC | Age: 60
End: 2020-02-17

## 2020-02-17 ENCOUNTER — TELEPHONE (OUTPATIENT)
Dept: ORTHOPEDIC SURGERY | Age: 60
End: 2020-02-17

## 2020-02-19 ENCOUNTER — TELEPHONE (OUTPATIENT)
Dept: ORTHOPEDIC SURGERY | Age: 60
End: 2020-02-19

## 2020-02-19 RX ORDER — OXYCODONE HYDROCHLORIDE 5 MG/1
5-10 TABLET ORAL EVERY 4 HOURS PRN
Qty: 40 TABLET | Refills: 0 | Status: SHIPPED | OUTPATIENT
Start: 2020-02-19 | End: 2020-02-27 | Stop reason: SDUPTHER

## 2020-02-19 NOTE — TELEPHONE ENCOUNTER
Pt is calling to get a refill on her pain medicine.   Pharmacy [address]  Last filled 2/12  Quantity 40  Please advise

## 2020-02-25 ENCOUNTER — TELEPHONE (OUTPATIENT)
Dept: ORTHOPEDIC SURGERY | Age: 60
End: 2020-02-25

## 2020-02-27 ENCOUNTER — TELEPHONE (OUTPATIENT)
Dept: ORTHOPEDIC SURGERY | Age: 60
End: 2020-02-27

## 2020-02-28 ENCOUNTER — TELEPHONE (OUTPATIENT)
Dept: ORTHOPEDIC SURGERY | Age: 60
End: 2020-02-28

## 2020-02-28 RX ORDER — OXYCODONE HYDROCHLORIDE 5 MG/1
5-10 TABLET ORAL EVERY 4 HOURS PRN
Qty: 40 TABLET | Refills: 0 | Status: SHIPPED | OUTPATIENT
Start: 2020-02-28 | End: 2020-03-02 | Stop reason: SDUPTHER

## 2020-02-28 NOTE — TELEPHONE ENCOUNTER
Patient called checking on the status of her pain medication refill request. Please advise.  #[phone]

## 2020-03-02 ENCOUNTER — OFFICE VISIT (OUTPATIENT)
Dept: ORTHOPEDIC SURGERY | Age: 60
End: 2020-03-02

## 2020-03-02 VITALS — TEMPERATURE: 97.8 F

## 2020-03-02 PROCEDURE — 99024 POSTOP FOLLOW-UP VISIT: CPT | Performed by: PHYSICIAN ASSISTANT

## 2020-03-02 RX ORDER — TIZANIDINE 4 MG/1
4 TABLET ORAL 4 TIMES DAILY PRN
Qty: 40 TABLET | Refills: 0 | Status: SHIPPED | OUTPATIENT
Start: 2020-03-02 | End: 2021-06-25 | Stop reason: SDUPTHER

## 2020-03-02 RX ORDER — OXYCODONE HYDROCHLORIDE 5 MG/1
5-10 TABLET ORAL EVERY 4 HOURS PRN
Qty: 40 TABLET | Refills: 0 | Status: SHIPPED | OUTPATIENT
Start: 2020-03-02 | End: 2020-03-23 | Stop reason: SDUPTHER

## 2020-03-12 ENCOUNTER — TELEPHONE (OUTPATIENT)
Dept: ORTHOPEDIC SURGERY | Age: 60
End: 2020-03-12

## 2020-03-13 ENCOUNTER — TELEPHONE (OUTPATIENT)
Dept: ORTHOPEDIC SURGERY | Age: 60
End: 2020-03-13

## 2020-03-13 NOTE — TELEPHONE ENCOUNTER
Natalie from [facility] is calling to see if we can extend her home PT for 2 weeks. Pastora stated she doesn't have a way to outpatient pt and she has not met all her goals.

## 2020-03-23 ENCOUNTER — TELEPHONE (OUTPATIENT)
Dept: ORTHOPEDIC SURGERY | Age: 60
End: 2020-03-23

## 2020-03-23 RX ORDER — OXYCODONE HYDROCHLORIDE 5 MG/1
5 TABLET ORAL EVERY 4 HOURS PRN
Qty: 40 TABLET | Refills: 0 | Status: SHIPPED | OUTPATIENT
Start: 2020-03-23 | End: 2020-04-06 | Stop reason: SDUPTHER

## 2020-03-31 ENCOUNTER — NURSE TRIAGE (OUTPATIENT)
Dept: OTHER | Facility: CLINIC | Age: 60
End: 2020-03-31

## 2020-03-31 ENCOUNTER — TELEPHONE (OUTPATIENT)
Dept: PRIMARY CARE CLINIC | Age: 60
End: 2020-03-31

## 2020-04-01 RX ORDER — BENZONATATE 100 MG/1
100-200 CAPSULE ORAL 3 TIMES DAILY PRN
Qty: 60 CAPSULE | Refills: 0 | Status: SHIPPED | OUTPATIENT
Start: 2020-04-01 | End: 2020-04-08

## 2020-04-06 ENCOUNTER — TELEPHONE (OUTPATIENT)
Dept: ORTHOPEDIC SURGERY | Age: 60
End: 2020-04-06

## 2020-04-06 RX ORDER — OXYCODONE HYDROCHLORIDE 5 MG/1
5 TABLET ORAL EVERY 6 HOURS PRN
Qty: 28 TABLET | Refills: 0 | Status: SHIPPED | OUTPATIENT
Start: 2020-04-06 | End: 2020-04-20 | Stop reason: SDUPTHER

## 2020-04-20 ENCOUNTER — TELEPHONE (OUTPATIENT)
Dept: ORTHOPEDIC SURGERY | Age: 60
End: 2020-04-20

## 2020-04-20 RX ORDER — OXYCODONE HYDROCHLORIDE 5 MG/1
5 TABLET ORAL EVERY 8 HOURS PRN
Qty: 21 TABLET | Refills: 0 | Status: SHIPPED | OUTPATIENT
Start: 2020-04-20 | End: 2020-05-04 | Stop reason: SDUPTHER

## 2020-05-01 ENCOUNTER — TELEPHONE (OUTPATIENT)
Dept: ORTHOPEDIC SURGERY | Age: 60
End: 2020-05-01

## 2020-05-01 NOTE — TELEPHONE ENCOUNTER
PATIENT SON HAS BEEN OFF ON FMLA TO TAKE CARE OF HIS MOM AND HE RETURNED TO WORK 3-6-20 AND HE WOULD LIKE A NOTE TO RETURN TO WORK. HE WOULD LIKE A CALL BACK.   HE CAN BE REACHED [phone]

## 2020-05-01 NOTE — TELEPHONE ENCOUNTER
I spoke with Aneta Mejia. He needs a note stating he went back to work on 3/6/20 after helping care for his mother. This was emailed to [email or web address]. He was instructed to call the office Monday if he did not receive the email. He will then come  a copy at the office.

## 2020-05-04 ENCOUNTER — TELEPHONE (OUTPATIENT)
Dept: ORTHOPEDIC SURGERY | Age: 60
End: 2020-05-04

## 2020-05-04 RX ORDER — OXYCODONE HYDROCHLORIDE 5 MG/1
5 TABLET ORAL 2 TIMES DAILY
Qty: 14 TABLET | Refills: 0 | Status: SHIPPED | OUTPATIENT
Start: 2020-05-04 | End: 2020-05-11

## 2020-06-01 ENCOUNTER — VIRTUAL VISIT (OUTPATIENT)
Dept: ORTHOPEDIC SURGERY | Age: 60
End: 2020-06-01
Payer: MEDICARE

## 2020-06-01 PROCEDURE — G8427 DOCREV CUR MEDS BY ELIG CLIN: HCPCS | Performed by: ORTHOPAEDIC SURGERY

## 2020-06-01 PROCEDURE — 1036F TOBACCO NON-USER: CPT | Performed by: ORTHOPAEDIC SURGERY

## 2020-06-01 PROCEDURE — 3017F COLORECTAL CA SCREEN DOC REV: CPT | Performed by: ORTHOPAEDIC SURGERY

## 2020-06-01 PROCEDURE — G8417 CALC BMI ABV UP PARAM F/U: HCPCS | Performed by: ORTHOPAEDIC SURGERY

## 2020-06-01 PROCEDURE — 99213 OFFICE O/P EST LOW 20 MIN: CPT | Performed by: ORTHOPAEDIC SURGERY

## 2020-06-01 NOTE — PROGRESS NOTES
Patient is a 61-year-old female status post right total knee arthroplasty on February 12 of 2020. She recovered well without any issues and last time I saw her was about a month and a half ago to 2 months. She is now just over 3 months postop and is here on a telephone call. Patient states that she is doing well struggling with some swelling but has good pain relief good quadricep strength and now take steps normally and no longer uses an ambulatory aid. Patient Active Problem List   Diagnosis    CTS (carpal tunnel syndrome)    BWC: Primary osteoarthritis of right knee    Allergic rhinitis    Polyp of colon    Diverticulosis of large intestine without hemorrhage    Mild intermittent asthma without complication    Mixed hyperlipidemia    Essential hypertension    Primary osteoarthritis of both knees    Type 2 diabetes mellitus without complication, without long-term current use of insulin (HCC)    Arthritis of right knee     Prior to Visit Medications    Medication Sig Taking? Authorizing Provider   metFORMIN (GLUCOPHAGE) 500 MG tablet Take 1 tablet by mouth 2 times daily (with meals)  Karan Velasquez MD   tiZANidine (ZANAFLEX) 4 MG tablet Take 1 tablet by mouth 4 times daily as needed (prn spasm)  SAGE Zaldivar   aspirin EC 81 MG EC tablet Take 1 tablet by mouth 2 times daily for 14 days Take for DVT blood clot rpophylaxis. Please avoid missing doses.   Vonnie Hall, APRN - CNP   chlorthalidone (HYGROTON) 25 MG tablet Take 1 tablet by mouth daily  Ismael Dixon MD   atorvastatin (LIPITOR) 40 MG tablet Take 1 tablet by mouth daily  Karan Velasquez MD   amLODIPine (NORVASC) 10 MG tablet Take 1 tablet by mouth daily  Karan Velasquez MD   fluticasone [facility]) 50 MCG/ACT nasal spray 1 spray by Nasal route daily  Karan Velasquez MD   acetaminophen (TYLENOL) 500 MG tablet Take 1,000 mg by mouth every 4 hours as needed for Pain  Historical Provider, MD     Physical examination patient is a 61-year-old female who states that she is afebrile this morning. Patient states that she can stand on her toes and her heels. Patient has good strength within the lower extremities and walks without an ambulatory aid. Patient states that she can fully extend her knee and bend it to almost thigh on calf flexion. Patient states the wound is well-healed and no signs of deep sepsis or inflammation are noted. Patient states her distal neurovascular examination is intact with no numbness or tingling in her foot or ankle. Patient states she has no other obvious cutaneous lesions lymphedema or cellulitic processes noted in the lower extremity. X-rays in the past have shown well aligned uncemented total knee arthroplasty. Impression 61-year-old female stable status post right total knee arthroplasty. Plan we had a 15-minute face-to-face discussion with this patient of which greater than 50% time was spent on counseling about further care and treatment of her right knee arthroplasty. We discussed the need for continued physical therapy and range of motion and strengthening exercises. We went over the need for antibiotic prophylaxis for any dental and/or surgical event. We will see her back in about 2 to 3 months or as needed with new x-rays at that time.

## 2020-06-04 ENCOUNTER — TELEPHONE (OUTPATIENT)
Dept: PRIMARY CARE CLINIC | Age: 60
End: 2020-06-04

## 2020-06-04 RX ORDER — BLOOD PRESSURE TEST KIT
KIT MISCELLANEOUS
Qty: 1 KIT | Refills: 0 | Status: SHIPPED | OUTPATIENT
Start: 2020-06-04

## 2020-09-11 ENCOUNTER — TELEPHONE (OUTPATIENT)
Dept: PRIMARY CARE CLINIC | Age: 60
End: 2020-09-11

## 2020-09-11 RX ORDER — AMLODIPINE BESYLATE 10 MG/1
10 TABLET ORAL DAILY
Qty: 30 TABLET | Refills: 0 | Status: SHIPPED | OUTPATIENT
Start: 2020-09-11 | End: 2020-12-08 | Stop reason: SDUPTHER

## 2020-09-11 NOTE — TELEPHONE ENCOUNTER
Patient has NTP virtual appointment with Dr. Milagro Beaver on 9/23/20. She wants to know if Dr. Milagro Beaver will sendin a short Prescription for Amlodipine until her appointment. Please call and advise.

## 2020-09-14 ENCOUNTER — OFFICE VISIT (OUTPATIENT)
Dept: ORTHOPEDIC SURGERY | Age: 60
End: 2020-09-14
Payer: MEDICARE

## 2020-09-14 VITALS — TEMPERATURE: 97.1 F

## 2020-09-14 PROBLEM — Z96.651 HISTORY OF TOTAL KNEE ARTHROPLASTY, RIGHT: Status: ACTIVE | Noted: 2020-09-14

## 2020-09-14 PROCEDURE — G8417 CALC BMI ABV UP PARAM F/U: HCPCS | Performed by: PHYSICIAN ASSISTANT

## 2020-09-14 PROCEDURE — L1812 KO ELASTIC W/JOINTS PRE OTS: HCPCS | Performed by: PHYSICIAN ASSISTANT

## 2020-09-14 PROCEDURE — G8427 DOCREV CUR MEDS BY ELIG CLIN: HCPCS | Performed by: PHYSICIAN ASSISTANT

## 2020-09-14 PROCEDURE — 1036F TOBACCO NON-USER: CPT | Performed by: PHYSICIAN ASSISTANT

## 2020-09-14 PROCEDURE — 99213 OFFICE O/P EST LOW 20 MIN: CPT | Performed by: PHYSICIAN ASSISTANT

## 2020-09-14 PROCEDURE — 3017F COLORECTAL CA SCREEN DOC REV: CPT | Performed by: PHYSICIAN ASSISTANT

## 2020-09-14 NOTE — PROGRESS NOTES
Subjective:      Patient ID: Ed Cordero is a 64 y.o.  female. Chief Complaint   Patient presents with    Knee Problem     Right knee        HPI: She is here for follow-up on her right knee. She is status post right knee arthroplasty 2020. She was doing well. She states several days ago while sleeping she thinks she may have twisted her knee. Now complaining of medial knee pain 8 out of 10. Review of Systems:   Negative for fever or chills.   Negative for numbness or tingling right lower extremity    Past Medical History:   Diagnosis Date    Allergic rhinitis     Arthritis     Asthma     not currently medically treated    Essential hypertension 2018    Hyperlipidemia     Neuropathy     Osteoarthritis     Type 2 diabetes mellitus without complication, without long-term current use of insulin ([facility] [address] 2019    Wears glasses        Family History   Problem Relation Age of Onset    Cancer Mother         pancreatic    Cirrhosis Father     High Blood Pressure Father        Past Surgical History:   Procedure Laterality Date    CARPAL TUNNEL RELEASE Right 7-10-14    FOOT SURGERY Right     rods placed    KNEE ARTHROSCOPY Right     MENISCECTOMY Right     TOTAL KNEE ARTHROPLASTY Right 2020    RIGHT TOTAL KNEE REPLACEMENT ROBOTIC ASSISTED performed by Parvez Sanon MD at [address] [place] [address] History     Occupational History    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   Tobacco Use    Smoking status: Former Smoker     Packs/day: 1.50     Years: 9.00     Pack years: 13.50     Types: Cigarettes     Last attempt to quit: 2009     Years since quittin.1    Smokeless tobacco: Never Used    Tobacco comment: quit:  7 years ago   Substance and Sexual Activity    Alcohol use: Yes     Comment: rare    Drug use: Never    Sexual activity: Never       Current Outpatient Medications   Medication Sig Dispense Refill    amLODIPine (NORVASC) 10 MG tablet Take 1 tablet by mouth daily 30 tablet 0  Blood Pressure KIT Ck blood pressure once a week 1 kit 0    metFORMIN (GLUCOPHAGE) 500 MG tablet Take 1 tablet by mouth 2 times daily (with meals) 60 tablet 5    tiZANidine (ZANAFLEX) 4 MG tablet Take 1 tablet by mouth 4 times daily as needed (prn spasm) 40 tablet 0    aspirin EC 81 MG EC tablet Take 1 tablet by mouth 2 times daily for 14 days Take for DVT blood clot rpophylaxis. Please avoid missing doses. 28 tablet 0    chlorthalidone (HYGROTON) 25 MG tablet Take 1 tablet by mouth daily 30 tablet 5    atorvastatin (LIPITOR) 40 MG tablet Take 1 tablet by mouth daily 30 tablet 5    fluticasone (FLONASE) 50 MCG/ACT nasal spray 1 spray by Nasal route daily 1 Bottle 3    acetaminophen (TYLENOL) 500 MG tablet Take 1,000 mg by mouth every 4 hours as needed for Pain       No current facility-administered medications for this visit. Objective:   She is alert, oriented x 3, pleasant, well nourished, developed and in no acute distress. Temp 97.1 °F (36.2 °C) (Temporal)   LMP 06/01/2014      Examination of the right knee: The alignment of the knee is neutral.   There is not erythema. There no soft tissue swelling. There is no effusion. ROM-  Extension 0          -   Flexion  125+   There mild pain associated with ROM testing. She has tenderness over the course of the MCL. She has no varus valgus instability and full extension or flexion greater than 90 degrees. Extensor Mechanism is  intact. Examination of the lower extremities are intact with sensation to light touch. Motor testing  5/5 in all major motor groups of the lower extremities. Gait is normal heel to toe. Gait is mildly antalgic. Negative Sutton's Sign. SLR negative. Examination of the lower extremities shows intact perfusion to all extremities. No cyanosis. Digits are warm to touch, capillary refill is less than 2 seconds. There is no edema noted.      Examination of the skin over both lower extremities reveals: The skin to be intact without lacerations or abrasions. No significant erythema. No rashes or skin lesions. X Rays: performed in the office today:   AP Standing, Lateral and Sunrise  Right Knee: There is a right total knee arthroplasty present. The alignment is satisfactory. There are no signs of failure or loosening. Diagnosis:        ICD-10-CM    1. History of total knee arthroplasty, right  Z96.651 XR KNEE RIGHT (3 VIEWS)     Breg Economy Hinged Knee Brace        Assessment/ Plan:      Stable right knee arthroplasty. Mild MCL sprain. Placed in a economy hinged knee brace. The natural history of the patient's diagnosis as well as the treatment options were discussed in full and questions were answered. Risks and benefits of the treatment options also reviewed in detail. Rest, Ice, Compression and Elevation  OTC NSAID'S discussed to be taken in appropriate  therapeutic doses. Activity restriction/ Modification discussed. The patient was advised that NSAID-type medications have two very important potential side effects: gastrointestinal irritation including hemorrhage and renal injuries. She was asked to take the medication with food and to stop if she experiences any GI upset. I asked her to call for vomiting, abdominal pain or black/bloody stools. He should have renal function testing per his medical provider periodically. The patient expresses understanding of these issues and questions were answered. Procedures    Breg Economy Hinged Knee Brace     Patient was prescribed a Breg Economy Hinged Knee Brace. The right knee will require stabilization / immobilization from this semi-rigid / rigid orthosis to improve their function. The orthosis will assist in protecting the affected area, provide functional support and facilitate healing.     The patient was educated and fit by a healthcare professional with expert knowledge and specialization in brace application while under the direct supervision of the treating physician. Verbal and written instructions for the use of and application of this item were provided. They were instructed to contact the office immediately should the brace result in increased pain, decreased sensation, increased swelling or worsening of the condition. Follow Up: 6 weeks  Call or return to clinic prn if these symptoms worsen or fail to improve as anticipated.

## 2020-09-15 ENCOUNTER — TELEPHONE (OUTPATIENT)
Dept: PRIMARY CARE CLINIC | Age: 60
End: 2020-09-15

## 2020-09-15 ENCOUNTER — TELEPHONE (OUTPATIENT)
Dept: ORTHOPEDIC SURGERY | Age: 60
End: 2020-09-15

## 2020-09-15 RX ORDER — HYDROCHLOROTHIAZIDE 12.5 MG/1
12.5 TABLET ORAL DAILY
Qty: 30 TABLET | Refills: 3 | Status: SHIPPED | OUTPATIENT
Start: 2020-09-15 | End: 2020-12-08 | Stop reason: SDUPTHER

## 2020-09-15 NOTE — TELEPHONE ENCOUNTER
Pt states she requested a water pill from Dr. Farideh Lawton, but he ordered amlodipine. She has not taken that one because she said he should have ordered water pill instead. Please advise.       PHONE:   [phone]

## 2020-10-05 ENCOUNTER — OFFICE VISIT (OUTPATIENT)
Dept: ORTHOPEDIC SURGERY | Age: 60
End: 2020-10-05
Payer: MEDICARE

## 2020-10-05 VITALS — HEIGHT: 61 IN | TEMPERATURE: 98.3 F | WEIGHT: 186 LBS | RESPIRATION RATE: 16 BRPM | BODY MASS INDEX: 35.12 KG/M2

## 2020-10-05 PROCEDURE — G8484 FLU IMMUNIZE NO ADMIN: HCPCS | Performed by: ORTHOPAEDIC SURGERY

## 2020-10-05 PROCEDURE — G8427 DOCREV CUR MEDS BY ELIG CLIN: HCPCS | Performed by: ORTHOPAEDIC SURGERY

## 2020-10-05 PROCEDURE — G8417 CALC BMI ABV UP PARAM F/U: HCPCS | Performed by: ORTHOPAEDIC SURGERY

## 2020-10-05 PROCEDURE — 20526 THER INJECTION CARP TUNNEL: CPT | Performed by: ORTHOPAEDIC SURGERY

## 2020-10-05 PROCEDURE — 99203 OFFICE O/P NEW LOW 30 MIN: CPT | Performed by: ORTHOPAEDIC SURGERY

## 2020-10-05 NOTE — PROGRESS NOTES
Ms. Regis Rivera is a 64 y.o. right handed woman  who is seen today in Hand Surgical Consultation at the request of Kerline Owen MD.    She presents today regarding Right side symptoms which have been present for approximately 3 months. A history of antecedent trauma or injury is Absent. She reports symptoms to include moderate numbness & tingling in the Index Finger, Middle Finger and Ring Finger. Hand symptoms do Frequently awaken her from sleep. She reports mild pain located in the dorsal and palmar right wrist. Symptoms are improving over time. Previous treatment has included prior carpal tunnel release done elsewhere in 2014. She does not claim relation of her symptoms to her required work activities. She has not undergone electrodiagnostic testing. The patient's , past medical history, medications, allergies,  family history, social history, and review of systems have been updated, reviewed and have been scanned into the chart today. Physical Exam:  Ms. Parvin Valdes most recent vitals:  Vitals  Temp: 98.3 °F (36.8 °C)  Resp: 16  Height: 5' 1\" (154.9 cm)  Weight: 186 lb (84.4 kg)    She is well nourished, oriented to person, place & time. She demonstrates appropriate mood and affect as well as normal gait and station. Skin: Normal in appearance, Normal Color and Free of Lesions Bilaterally   Digital range of motion is without significant limitation bilaterally  Wrist range of motion is equal bilaterally   There is no evidence of gross joint instability bilaterally. Sensation is subjectively tingling in the Index Finger, Middle Finger and Ring Finger on the Right, normal on the Left and objectively present in the same distribution bilaterally. Vascular examination reveals normal and good capillary refill bilaterally  Swelling is mild in the distal volar forearm on the Right, normal on the Left  Muscular strength is clinically appropriate bilaterally.   Examination for Carpal Tunnel Syndrome shows Carpal Tunnel Compression Test to be Mildly Positive on the right & Negative on the left. The patient displays moderate baseline symptoms to potentially confound the exam.  The thenar musculature is mildly atrophied & weakened. Impression:  Ms. Irma Kolb is showing clinical evidence of what may be recurrent Carpal Tunnel Syndrome and presents requesting further treatment. Plan:    . I have had a thorough discussion with Ms. Irma Kolb regarding the treatment options available for her initially presenting Right Neurologic Symptoms, which are causing her functional limitations. I have outlined for Ms. Irma Kolb the benefits and consequences of the various treatment modalities, including a reasonable expectation for the long term success and the likelihood that further more aggressive treatment may be required for her current presenting condition. Based upon our current discussion and a reasonable understating of the options available to her, Ms. Irma Kolb has selected to proceed with  a DIAGNOSTIC INJECTION to the right carpal tunnel(s). I have outlined for her the nature of the injection, and the pre, shelby and post injection considerations and the appropriate expectations for this injection. We discussed appropriate expectations for symptom resolution & likely duration of the relief. I have specifically explained to Ms. Irma Kolb, that the intent of the injection is to determine if she gains any relief of her presenting symptoms over the next 3-4 weeks. She is advised that any symptoms arising from carpal tunnel syndrome will respond to the injection, and that she is to understand that any symptoms that do not respond to the injection are arising from an alternate etiology. She voiced an understanding of the intent of the injection and had her questions answered fully.  I have clearly explained to her that the above outlined treatment plan should not be expected to

## 2020-10-05 NOTE — PATIENT INSTRUCTIONS
Information & Instructions   After Finger, Hand, Wrist, or Elbow Injection    Karey Noriega MD    You have received an injection of local anesthetic (Bupivicaine without Epinephrine) for comfort & a steroid (Kenalog) for its strong anti-inflammatory effects. In order to give the medication a chance to reduce your inflammation and discomfort, it is recommended that you take it easy for a day or so. You may use your hand and arm as you feel comfortable, but you should avoid highly strenuous activity and heavy use for several days. Relief from the injection will often not begin for several days, and you may not feel full relief for up to one month. It is not uncommon to experience some local discomfort or pain at or around the injection site for a few days. To relieve these symptoms you may do the following if you feel necessary:       Apply ice to the affected area 20 minutes on and 20 minutes off. Do not apply ice directly to the skin. Use a thin layer (T-shirt, pillowcase, towel, etc.) to protect the skin. - If allowed by your other medical physicians, you may take -     2 Tylenol extra strength tablets every 4-6 hours       1-2 Aleve tablets twice a day     2-3 Advil tablets two to three times a day    If you are diabetic, the steroid medication may increase your blood sugar, so you are advised to monitor your sugar more closely so you can adjust it accordingly for a few days following your injection. If you need assistance with the control of you blood sugar, please contact you primary care physician for further advice. I will request that you please call the office one month after your injection at [phone] if you have not experienced relief of your symptoms (unless I have instructed you otherwise). If your injection has given you good relief of you symptoms as expected, then you only need to call the office if your symptoms return.

## 2020-10-26 ENCOUNTER — HOSPITAL ENCOUNTER (OUTPATIENT)
Dept: WOMENS IMAGING | Age: 60
Discharge: HOME | End: 2020-10-26
Payer: MEDICARE

## 2020-10-26 PROCEDURE — 77063 BREAST TOMOSYNTHESIS BI: CPT

## 2020-10-27 ENCOUNTER — OFFICE VISIT (OUTPATIENT)
Dept: ORTHOPEDIC SURGERY | Age: 60
End: 2020-10-27
Payer: MEDICARE

## 2020-10-27 VITALS — TEMPERATURE: 98.2 F

## 2020-10-27 PROCEDURE — G8417 CALC BMI ABV UP PARAM F/U: HCPCS | Performed by: PHYSICIAN ASSISTANT

## 2020-10-27 PROCEDURE — G8484 FLU IMMUNIZE NO ADMIN: HCPCS | Performed by: PHYSICIAN ASSISTANT

## 2020-10-27 PROCEDURE — G8428 CUR MEDS NOT DOCUMENT: HCPCS | Performed by: PHYSICIAN ASSISTANT

## 2020-10-27 PROCEDURE — 99213 OFFICE O/P EST LOW 20 MIN: CPT | Performed by: PHYSICIAN ASSISTANT

## 2020-10-27 PROCEDURE — 1036F TOBACCO NON-USER: CPT | Performed by: PHYSICIAN ASSISTANT

## 2020-10-27 PROCEDURE — 3017F COLORECTAL CA SCREEN DOC REV: CPT | Performed by: PHYSICIAN ASSISTANT

## 2020-11-09 ENCOUNTER — TELEPHONE (OUTPATIENT)
Dept: ORTHOPEDIC SURGERY | Age: 60
End: 2020-11-09

## 2020-11-09 NOTE — TELEPHONE ENCOUNTER
Patient requested for her appt. Today on 11/9/2020 to be moved to the morning time available. She stated that the CTS diagnostic injection did help but that her hand is starting to bother her again, it locked up on in the morning. I informed her that it would be too soon to receive another injection and that she should reschedule her appt. For a later time. She rescheduled her appt. For another 4 weeks. She stated she did not want to schedule surgery.

## 2020-11-10 ENCOUNTER — OFFICE VISIT (OUTPATIENT)
Dept: GYNECOLOGY | Age: 60
End: 2020-11-10
Payer: MEDICARE

## 2020-11-10 VITALS
RESPIRATION RATE: 17 BRPM | WEIGHT: 199 LBS | SYSTOLIC BLOOD PRESSURE: 148 MMHG | TEMPERATURE: 97 F | HEART RATE: 77 BPM | BODY MASS INDEX: 37.57 KG/M2 | HEIGHT: 61 IN | DIASTOLIC BLOOD PRESSURE: 98 MMHG

## 2020-11-10 PROCEDURE — G8484 FLU IMMUNIZE NO ADMIN: HCPCS | Performed by: OBSTETRICS & GYNECOLOGY

## 2020-11-10 PROCEDURE — 99396 PREV VISIT EST AGE 40-64: CPT | Performed by: OBSTETRICS & GYNECOLOGY

## 2020-11-11 ASSESSMENT — ENCOUNTER SYMPTOMS
GASTROINTESTINAL NEGATIVE: 1
RESPIRATORY NEGATIVE: 1
EYES NEGATIVE: 1

## 2020-11-11 NOTE — PROGRESS NOTES
Subjective:      Patient ID: Marisela Spears is a 64 y.o. female. Patient is here for annual. Patient in menopause. Gynecologic Exam         Review of Systems   Constitutional: Negative. HENT: Negative. Eyes: Negative. Respiratory: Negative. Cardiovascular: Negative. Gastrointestinal: Negative. Genitourinary: Negative. Musculoskeletal: Negative. Skin: Negative. Neurological: Negative. Psychiatric/Behavioral: Negative.       Date of Birth 1960  Past Medical History:   Diagnosis Date    Allergic rhinitis     Arthritis     Asthma     not currently medically treated    Essential hypertension 2018    Hyperlipidemia     Neuropathy     Osteoarthritis     Type 2 diabetes mellitus without complication, without long-term current use of insulin ([facility] [address] 2019    Wears glasses      Past Surgical History:   Procedure Laterality Date    CARPAL TUNNEL RELEASE Right 7-10-14    FOOT SURGERY Right     rods placed    KNEE ARTHROSCOPY Right     MENISCECTOMY Right     TOTAL KNEE ARTHROPLASTY Right 2020    RIGHT TOTAL KNEE REPLACEMENT ROBOTIC ASSISTED performed by Calvin Faith MD at [facility] [place] [address]     OB History    Para Term  AB Living   6 6 6     6   SAB TAB Ectopic Molar Multiple Live Births           1        # Outcome Date GA Lbr Lior/2nd Weight Sex Delivery Anes PTL Lv   6 Term 1994 37w0d   M Vag-Spont      5 Term 12 37w0d   M Vag-Spont      4 Term 46 37w0d   F Vag-Spont      3 Term 12 37w0d   M Vag-Spont      2 Term 26 37w0d   F Vag-Spont      1 Term 36 37w0d   M Vag-Spont        Social History     Socioeconomic History    Marital status: Single     Spouse name: Not on file    Number of children: 10    Years of education: Not on file    Highest education level: Not on file   Occupational History    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   Social Needs    Financial resource strain: Not on file    Food insecurity     Worry: Not on file     Inability: Not on file   [employer] needs     Medical: Not on file     Non-medical: Not on file   Tobacco Use    Smoking status: Former Smoker     Packs/day: 1.50     Years: 9.00     Pack years: 13.50     Types: Cigarettes     Last attempt to quit: 2009     Years since quittin.3    Smokeless tobacco: Never Used    Tobacco comment: quit:  7 years ago   Substance and Sexual Activity    Alcohol use: Yes     Comment: rare    Drug use: Never    Sexual activity: Never   Lifestyle    Physical activity     Days per week: Not on file     Minutes per session: Not on file    Stress: Not on file   Relationships    Social connections     Talks on phone: Not on file     Gets together: Not on file     Attends Yazdanism service: Not on file     Active member of club or organization: Not on file     Attends meetings of clubs or organizations: Not on file     Relationship status: Not on file    Intimate partner violence     Fear of current or ex partner: Not on file     Emotionally abused: Not on file     Physically abused: Not on file     Forced sexual activity: Not on file   Other Topics Concern    Not on file   Social History Narrative    Not on file     No Known Allergies  Outpatient Medications Marked as Taking for the 11/10/20 encounter (Office Visit) with Halley Rhodes MD   Medication Sig Dispense Refill    hydroCHLOROthiazide (HYDRODIURIL) 12.5 MG tablet Take 1 tablet by mouth daily 30 tablet 3    amLODIPine (NORVASC) 10 MG tablet Take 1 tablet by mouth daily 30 tablet 0    Blood Pressure KIT Ck blood pressure once a week 1 kit 0    metFORMIN (GLUCOPHAGE) 500 MG tablet Take 1 tablet by mouth 2 times daily (with meals) 60 tablet 5    tiZANidine (ZANAFLEX) 4 MG tablet Take 1 tablet by mouth 4 times daily as needed (prn spasm) 40 tablet 0    chlorthalidone (HYGROTON) 25 MG tablet Take 1 tablet by mouth daily 30 tablet 5    atorvastatin (LIPITOR) 40 MG tablet Take 1 tablet by mouth daily 30 tablet 5    inguinal area. Genitourinary:     General: Normal vulva. Exam position: Lithotomy position. Pubic Area: No rash. Labia:         Right: No rash, tenderness, lesion or injury. Left: No rash, tenderness, lesion or injury. Urethra: No prolapse, urethral pain, urethral swelling or urethral lesion. Vagina: Normal. No signs of injury and foreign body. No vaginal discharge, erythema, tenderness or bleeding. Cervix: No cervical motion tenderness, discharge, friability, lesion, erythema, cervical bleeding or eversion. Uterus: Not deviated, not enlarged, not fixed, not tender and no uterine prolapse. Adnexa:         Right: No mass, tenderness or fullness. Left: No mass, tenderness or fullness. Rectum: Normal. Guaiac result negative. No mass, tenderness, anal fissure, external hemorrhoid or internal hemorrhoid. Normal anal tone. Comments: Normal urethral meatus, nl urethra, nl bladder. Musculoskeletal: Normal range of motion. General: No tenderness. Lymphadenopathy:      Cervical: No cervical adenopathy. Lower Body: No right inguinal adenopathy. No left inguinal adenopathy. Skin:     General: Skin is warm and dry. Findings: No erythema or rash. Neurological:      General: No focal deficit present. Mental Status: She is alert and oriented to person, place, and time. Deep Tendon Reflexes: Reflexes are normal and symmetric. Psychiatric:         Mood and Affect: Mood normal.         Behavior: Behavior normal.         Thought Content: Thought content normal.         Judgment: Judgment normal.         Assessment:      1. Annual  2. menopause      Plan:      1.  Pap, calcium, exercise, mammogram, hemocult negative  2. stable        Anay Nielsen MD

## 2020-12-08 ENCOUNTER — OFFICE VISIT (OUTPATIENT)
Dept: PRIMARY CARE CLINIC | Age: 60
End: 2020-12-08
Payer: MEDICARE

## 2020-12-08 VITALS
TEMPERATURE: 96.6 F | WEIGHT: 205 LBS | BODY MASS INDEX: 38.71 KG/M2 | SYSTOLIC BLOOD PRESSURE: 160 MMHG | OXYGEN SATURATION: 98 % | DIASTOLIC BLOOD PRESSURE: 94 MMHG | HEART RATE: 78 BPM | HEIGHT: 61 IN

## 2020-12-08 LAB — HBA1C MFR BLD: 6 %

## 2020-12-08 PROCEDURE — G8417 CALC BMI ABV UP PARAM F/U: HCPCS | Performed by: FAMILY MEDICINE

## 2020-12-08 PROCEDURE — G8484 FLU IMMUNIZE NO ADMIN: HCPCS | Performed by: FAMILY MEDICINE

## 2020-12-08 PROCEDURE — 3017F COLORECTAL CA SCREEN DOC REV: CPT | Performed by: FAMILY MEDICINE

## 2020-12-08 PROCEDURE — 83036 HEMOGLOBIN GLYCOSYLATED A1C: CPT | Performed by: FAMILY MEDICINE

## 2020-12-08 PROCEDURE — G8427 DOCREV CUR MEDS BY ELIG CLIN: HCPCS | Performed by: FAMILY MEDICINE

## 2020-12-08 PROCEDURE — 99214 OFFICE O/P EST MOD 30 MIN: CPT | Performed by: FAMILY MEDICINE

## 2020-12-08 PROCEDURE — 1036F TOBACCO NON-USER: CPT | Performed by: FAMILY MEDICINE

## 2020-12-08 PROCEDURE — 2022F DILAT RTA XM EVC RTNOPTHY: CPT | Performed by: FAMILY MEDICINE

## 2020-12-08 PROCEDURE — 3044F HG A1C LEVEL LT 7.0%: CPT | Performed by: FAMILY MEDICINE

## 2020-12-08 RX ORDER — CHLORTHALIDONE 25 MG/1
25 TABLET ORAL DAILY
Qty: 30 TABLET | Refills: 5 | Status: SHIPPED | OUTPATIENT
Start: 2020-12-08 | End: 2020-12-08

## 2020-12-08 RX ORDER — ASPIRIN 81 MG/1
81 TABLET ORAL 2 TIMES DAILY
Qty: 30 TABLET | Refills: 5 | Status: SHIPPED | OUTPATIENT
Start: 2020-12-08 | End: 2021-06-25 | Stop reason: SDUPTHER

## 2020-12-08 RX ORDER — ERGOCALCIFEROL 1.25 MG/1
50000 CAPSULE ORAL WEEKLY
Qty: 4 CAPSULE | Refills: 2 | Status: SHIPPED | OUTPATIENT
Start: 2020-12-08 | End: 2021-06-25 | Stop reason: SDUPTHER

## 2020-12-08 RX ORDER — AMLODIPINE BESYLATE 10 MG/1
10 TABLET ORAL DAILY
Qty: 30 TABLET | Refills: 5 | Status: SHIPPED | OUTPATIENT
Start: 2020-12-08 | End: 2021-06-25 | Stop reason: SDUPTHER

## 2020-12-08 RX ORDER — LOSARTAN POTASSIUM 50 MG/1
50 TABLET ORAL DAILY
Qty: 30 TABLET | Refills: 5 | Status: SHIPPED | OUTPATIENT
Start: 2020-12-08 | End: 2021-06-25 | Stop reason: SDUPTHER

## 2020-12-08 RX ORDER — HYDROCHLOROTHIAZIDE 12.5 MG/1
TABLET ORAL
Qty: 30 TABLET | Refills: 3 | Status: SHIPPED | OUTPATIENT
Start: 2020-12-08 | End: 2021-06-25 | Stop reason: SDUPTHER

## 2020-12-08 ASSESSMENT — ENCOUNTER SYMPTOMS
EYE PAIN: 0
SINUS PRESSURE: 0
RHINORRHEA: 0
NAUSEA: 0
ORTHOPNEA: 0
CHEST TIGHTNESS: 0
BLURRED VISION: 0
APNEA: 0
DIARRHEA: 0
TROUBLE SWALLOWING: 0
BLOOD IN STOOL: 0
SORE THROAT: 0
EYE ITCHING: 0
CONSTIPATION: 0
VISUAL CHANGE: 0
VOMITING: 0
COLOR CHANGE: 0
EYE DISCHARGE: 0
ABDOMINAL DISTENTION: 0
CHOKING: 0
WHEEZING: 0
BACK PAIN: 0
STRIDOR: 0
SHORTNESS OF BREATH: 0
RECTAL PAIN: 0
EYE REDNESS: 0
COUGH: 0
PHOTOPHOBIA: 0

## 2020-12-08 NOTE — PATIENT INSTRUCTIONS
sugar-sweetened drinks like soda. The Mediterranean diet may also include red wine with your meal--1 glass each day for women and up to 2 glasses a day for men. Tips for eating at home  · Use herbs, spices, garlic, lemon zest, and citrus juice instead of salt to add flavor to foods. · Add avocado slices to your sandwich instead of neal. · Have fish for lunch or dinner instead of red meat. Brush the fish with olive oil, and broil or grill it. · Sprinkle your salad with seeds or nuts instead of cheese. · Cook with olive or canola oil instead of butter or oils that are high in saturated fat. · Switch from 2% milk or whole milk to 1% or fat-free milk. · Dip raw vegetables in a vinaigrette dressing or hummus instead of dips made from mayonnaise or sour cream.  · Have a piece of fruit for dessert instead of a piece of cake. Try baked apples, or have some dried fruit. Tips for eating out  · Try broiled, grilled, baked, or poached fish instead of having it fried or breaded. · Ask your  to have your meals prepared with olive oil instead of butter. · Order dishes made with marinara sauce or sauces made from olive oil. Avoid sauces made from cream or mayonnaise. · Choose whole-grain breads, whole wheat pasta and pizza crust, brown rice, beans, and lentils. · Cut back on butter or margarine on bread. Instead, you can dip your bread in a small amount of olive oil. · Ask for a side salad or grilled vegetables instead of french fries or chips. Where can you learn more? Go to [email or web address] [email or web address] and sign in to your Dropmysite account. Enter [phone] in the [facility] box to learn more about \"Learning About the Mediterranean Diet. \"     If you do not have an account, please click on the \"Sign Up Now\" link. Current as of: August 22, 2019               Content Version: 12.6  © 2610-1962 [employer]. Care instructions adapted under license by [facility] [place] ([facility]).  If you have questions about a medical condition or this instruction, always ask your healthcare professional. [place] [address] any warranty or liability for your use of this information.        get labs done     awv before xmas

## 2020-12-08 NOTE — PROGRESS NOTES
nephropathy, peripheral neuropathy, PVD or retinopathy. Current diabetic treatment includes oral agent (monotherapy). She is compliant with treatment all of the time. Her weight is stable. An ACE inhibitor/angiotensin II receptor blocker is being taken. Eye exam is current. Hyperlipidemia   This is a chronic problem. The current episode started more than 1 year ago. The problem is controlled. Recent lipid tests were reviewed and are normal. She has no history of chronic renal disease, diabetes, hypothyroidism, liver disease, obesity or nephrotic syndrome. Pertinent negatives include no chest pain, focal sensory loss, focal weakness, leg pain, myalgias or shortness of breath. Current antihyperlipidemic treatment includes statins. The current treatment provides significant improvement of lipids. There are no compliance problems. Risk factors for coronary artery disease include hypertension, obesity, diabetes mellitus and post-menopausal.     63 y/o female known htn, dm-2, hld follow up visit today. Hypertension  See hpi    Hyperlipidemia  See hpi    DM-2  See hpi    Review of Systems   Constitutional: Negative for activity change, appetite change, chills, diaphoresis, fatigue, fever, malaise/fatigue and weight loss. HENT: Negative for congestion, dental problem, drooling, ear discharge, ear pain, hearing loss, mouth sores, nosebleeds, postnasal drip, rhinorrhea, sinus pressure, sneezing, sore throat, tinnitus and trouble swallowing. Eyes: Negative for blurred vision, photophobia, pain, discharge, redness, itching and visual disturbance. Respiratory: Negative for apnea, cough, choking, chest tightness, shortness of breath, wheezing and stridor. Cardiovascular: Negative for chest pain, palpitations, orthopnea, leg swelling and PND. Gastrointestinal: Negative for abdominal distention, blood in stool, constipation, diarrhea, nausea, rectal pain and vomiting.    Endocrine: Negative for polydipsia, polyphagia and polyuria. Genitourinary: Negative for decreased urine volume, difficulty urinating, dyspareunia, dysuria, enuresis, flank pain, frequency, genital sores, hematuria, impotence, menstrual problem, pelvic pain, urgency, vaginal bleeding and vaginal pain. Musculoskeletal: Negative for arthralgias, back pain, gait problem, joint swelling, myalgias, neck pain and neck stiffness. Skin: Negative for color change, pallor, rash and wound. Neurological: Negative for dizziness, tremors, focal weakness, seizures, syncope, facial asymmetry, speech difficulty, weakness, light-headedness, numbness and headaches. Hematological: Negative for adenopathy. Does not bruise/bleed easily. Psychiatric/Behavioral: Negative for agitation, behavioral problems, confusion, decreased concentration, dysphoric mood, hallucinations, self-injury, sleep disturbance and suicidal ideas. The patient is not nervous/anxious and is not hyperactive. Objective:   Physical Exam  Constitutional:       General: She is not in acute distress. Appearance: She is well-developed. She is not diaphoretic. HENT:      Head: Normocephalic and atraumatic. Eyes:      Pupils: Pupils are equal, round, and reactive to light. Neck:      Musculoskeletal: Neck supple. Thyroid: No thyromegaly. Vascular: No JVD. Cardiovascular:      Rate and Rhythm: Normal rate and regular rhythm. Heart sounds: Normal heart sounds. No murmur. No gallop. Pulmonary:      Effort: No respiratory distress. Breath sounds: No stridor. No wheezing or rales. Abdominal:      General: Bowel sounds are normal.      Palpations: Abdomen is soft. Musculoskeletal: Normal range of motion. Skin:     General: Skin is warm and dry. Neurological:      Mental Status: She is alert and oriented to person, place, and time. Cranial Nerves: No cranial nerve deficit. Motor: No abnormal muscle tone.       Coordination: Coordination normal. Deep Tendon Reflexes: Reflexes are normal and symmetric. Reflexes normal.       Visual inspection:  Deformity/amputation: absent  Skin lesions/pre-ulcerative calluses: absent  Edema: right- negative, left- negative    Sensory exam:  Monofilament sensation: normal  (minimum of 5 random plantar locations tested, avoiding callused areas - > 1 area with absence of sensation is + for neuropathy)    Plus at least one of the following:  Pulses: normal,   Pinprick: Intact  Proprioception: Intact  Vibration (128 Hz): Intact    Assessment:      1. Type 2 diabetes mellitus without complication, without long-term current use of insulin (HCC)  AIC  6.0  Goal < 7  At goal current tx  Ck labs  - POCT glycosylated hemoglobin (Hb A1C)  - MICROALBUMIN / CREATININE URINE RATIO; Future  - Lipid, Fasting; Future  - aspirin EC 81 MG EC tablet; Take 1 tablet by mouth 2 times daily for 14 days Take for DVT blood clot rpophylaxis. Please avoid missing doses. Dispense: 30 tablet; Refill: 5  - losartan (COZAAR) 50 MG tablet; Take 1 tablet by mouth daily  Dispense: 30 tablet; Refill: 5    2. Essential hypertension  BP not at goal  < 140/90  Will address as follow  Add losartan  Low salt diet  - Comprehensive Metabolic Panel; Future  - CBC; Future  - Lipid, Fasting; Future  - amLODIPine (NORVASC) 10 MG tablet; Take 1 tablet by mouth daily  Dispense: 30 tablet; Refill: 5  - aspirin EC 81 MG EC tablet; Take 1 tablet by mouth 2 times daily for 14 days Take for DVT blood clot rpophylaxis. Please avoid missing doses. Dispense: 30 tablet; Refill: 5  - hydroCHLOROthiazide (HYDRODIURIL) 12.5 MG tablet; Take one tab a day stop chlorthalidone 25 mg  Dispense: 30 tablet; Refill: 3    3. Mixed hyperlipidemia  On statin  Goal HDL > 45  LDL < 100  - Lipid, Fasting; Future  - aspirin EC 81 MG EC tablet; Take 1 tablet by mouth 2 times daily for 14 days Take for DVT blood clot rpophylaxis. Please avoid missing doses. Dispense: 30 tablet; Refill: 5    4. Immune to hepatitis B    - HEPATITIS B SURFACE ANTIBODY; Future    5. Need for hepatitis B screening test    - HEPATITIS B SURFACE ANTIBODY; Future    6. Vitamin D deficiency  Low 26  Start vitamin d  - vitamin D (ERGOCALCIFEROL) 1.25 MG (70083 UT) CAPS capsule; Take 1 capsule by mouth once a week  Dispense: 4 capsule;  Refill: 2          Plan:         awv needed  Curt Rodriguez MD

## 2021-01-11 ENCOUNTER — OFFICE VISIT (OUTPATIENT)
Dept: ORTHOPEDIC SURGERY | Age: 61
End: 2021-01-11
Payer: MEDICARE

## 2021-01-11 VITALS — TEMPERATURE: 97 F | RESPIRATION RATE: 16 BRPM | BODY MASS INDEX: 38.71 KG/M2 | HEIGHT: 61 IN | WEIGHT: 205 LBS

## 2021-01-11 DIAGNOSIS — M65.30 TRIGGER FINGER, ACQUIRED: ICD-10-CM

## 2021-01-11 DIAGNOSIS — I10 ESSENTIAL HYPERTENSION: ICD-10-CM

## 2021-01-11 DIAGNOSIS — E11.9 TYPE 2 DIABETES MELLITUS WITHOUT COMPLICATION, WITHOUT LONG-TERM CURRENT USE OF INSULIN (HCC): ICD-10-CM

## 2021-01-11 DIAGNOSIS — Z11.59 NEED FOR HEPATITIS B SCREENING TEST: ICD-10-CM

## 2021-01-11 DIAGNOSIS — E78.2 MIXED HYPERLIPIDEMIA: ICD-10-CM

## 2021-01-11 DIAGNOSIS — G56.03 BILATERAL CARPAL TUNNEL SYNDROME: Primary | ICD-10-CM

## 2021-01-11 DIAGNOSIS — Z78.9 IMMUNE TO HEPATITIS B: ICD-10-CM

## 2021-01-11 LAB
A/G RATIO: 1.8 (ref 1.1–2.2)
ALBUMIN SERPL-MCNC: 4.5 G/DL (ref 3.4–5)
ALP BLD-CCNC: 69 U/L (ref 40–129)
ALT SERPL-CCNC: 14 U/L (ref 10–40)
ANION GAP SERPL CALCULATED.3IONS-SCNC: 11 MMOL/L (ref 3–16)
AST SERPL-CCNC: 15 U/L (ref 15–37)
BILIRUB SERPL-MCNC: <0.2 MG/DL (ref 0–1)
BUN BLDV-MCNC: 11 MG/DL (ref 7–20)
CALCIUM SERPL-MCNC: 9.7 MG/DL (ref 8.3–10.6)
CHLORIDE BLD-SCNC: 105 MMOL/L (ref 99–110)
CHOLESTEROL, FASTING: 186 MG/DL (ref 0–199)
CO2: 27 MMOL/L (ref 21–32)
CREAT SERPL-MCNC: 0.9 MG/DL (ref 0.6–1.1)
CREATININE URINE: 215.9 MG/DL (ref 28–259)
GFR AFRICAN AMERICAN: >60
GFR NON-AFRICAN AMERICAN: >60
GLOBULIN: 2.5 G/DL
GLUCOSE BLD-MCNC: 111 MG/DL (ref 70–99)
HCT VFR BLD CALC: 41.9 % (ref 36–48)
HDLC SERPL-MCNC: 55 MG/DL (ref 40–60)
HEMOGLOBIN: 13 G/DL (ref 12–16)
LDL CHOLESTEROL CALCULATED: 108 MG/DL
MCH RBC QN AUTO: 26.3 PG (ref 26–34)
MCHC RBC AUTO-ENTMCNC: 31 G/DL (ref 31–36)
MCV RBC AUTO: 84.9 FL (ref 80–100)
MICROALBUMIN UR-MCNC: 2.5 MG/DL
MICROALBUMIN/CREAT UR-RTO: 11.6 MG/G (ref 0–30)
PDW BLD-RTO: 16 % (ref 12.4–15.4)
PLATELET # BLD: 248 K/UL (ref 135–450)
PMV BLD AUTO: 9.6 FL (ref 5–10.5)
POTASSIUM SERPL-SCNC: 4.1 MMOL/L (ref 3.5–5.1)
RBC # BLD: 4.93 M/UL (ref 4–5.2)
SODIUM BLD-SCNC: 143 MMOL/L (ref 136–145)
TOTAL PROTEIN: 7 G/DL (ref 6.4–8.2)
TRIGLYCERIDE, FASTING: 117 MG/DL (ref 0–150)
VLDLC SERPL CALC-MCNC: 23 MG/DL
WBC # BLD: 6.1 K/UL (ref 4–11)

## 2021-01-11 PROCEDURE — G8417 CALC BMI ABV UP PARAM F/U: HCPCS | Performed by: PHYSICIAN ASSISTANT

## 2021-01-11 PROCEDURE — L3908 WHO COCK-UP NONMOLDE PRE OTS: HCPCS | Performed by: PHYSICIAN ASSISTANT

## 2021-01-11 PROCEDURE — G8484 FLU IMMUNIZE NO ADMIN: HCPCS | Performed by: PHYSICIAN ASSISTANT

## 2021-01-11 PROCEDURE — 3017F COLORECTAL CA SCREEN DOC REV: CPT | Performed by: PHYSICIAN ASSISTANT

## 2021-01-11 PROCEDURE — 99214 OFFICE O/P EST MOD 30 MIN: CPT | Performed by: PHYSICIAN ASSISTANT

## 2021-01-11 PROCEDURE — 20550 NJX 1 TENDON SHEATH/LIGAMENT: CPT | Performed by: PHYSICIAN ASSISTANT

## 2021-01-11 PROCEDURE — G8427 DOCREV CUR MEDS BY ELIG CLIN: HCPCS | Performed by: PHYSICIAN ASSISTANT

## 2021-01-11 PROCEDURE — 1036F TOBACCO NON-USER: CPT | Performed by: PHYSICIAN ASSISTANT

## 2021-01-11 NOTE — PROGRESS NOTES
Ms. Fish Powell returns today in follow-up of her previously treated  right Carpal Tunnel Syndrome. She was last seen by Dr. Christie Banerjee. Chang Sixto in October, 2020 at which time she was treated with disagnostic steroid injection . She experienced moderate relief of her initial symptoms. She  has noticed symptom worsening over the last several weeks. She returns today with worsened symptoms of right palmar and requesting further treatment. I have today reviewed with Fish Powell the clinically relevant, past medical history, medications, allergies,  family history, social history, and Review Of Systems & I have documented any details relevant to today's presenting complaints in my history above. Ms. Dinora Torres self-reported past medical history, medications, allergies,  family history, social history, and Review Of Systems have been scanned into the chart under the \"Media\" tab. Physical Exam:  Vitals  Temp: 97 °F (36.1 °C)  Temp Source: Infrared  Resp: 16  Height: 5' 1\" (154.9 cm)  Weight: 205 lb (93 kg)  Ms. Fish Powell appears well, she is in no apparent distress, she demonstrates appropriate mood & affect. Skin: Normal in appearance, Normal Color and Free of Lesions Bilaterally   Digital range of motion is limited by pain on the Right, normal on the Left  Wrist range of motion is Full and equal bilaterally bilaterally  There is no evidence of gross joint instability bilaterally. Sensation is subjectively tingling in the index finger, middle finger and ring finger on the right,normal on the left and objectively present in the same distribution bilaterally. Vascular examination reveals normal, good capillary refill and good color bilaterally. Swelling is mild in the Dorsal Volar right forearm, normal on the left  Muscular strength is clinically appropriate bilaterally.   Examination for Carpal Tunnel Syndrome shows Carpal Tunnel Compression Test to be Mildly Positive on the right & Negative on the left. The patient displays mild baseline symptoms to potentially confound the exam.  The thenar musculature is mildly atrophied & weakened. Examination for Stenosing Tenosynovitis demonstrates mild tenderness, thickening & nodularity at the A-1 pulley(s) of the Right Middle Finger. There is a palpable Nota's Node. There is Inducible triggering on active flexion with pain. No other digits demonstrate evidence of Stenosing Tenosynovitis. Impression:  Ms. Jude Martin is showing evidence of recurrent Carpal Tunnel Syndrome after previous treatment and initial symptoms of right middle finger trigger finger. She requests additional treatment at this time. Plan:  I have had a thorough discussion with Ms. Jude Martin regarding the treatment options available for her worsened right carpal tunnel syndrome, which is causing her significant symptoms and difficulty. I have outlined for Ms. Jude Martin the risk, benefits and consequences of the various treatment modalities, including a reasonable expectation for the long term success of each. We have discussed the likelihood that further, more aggressive treatment may be required for her current presenting condition. Based upon our current discussion and a reasonable understating of the options available to her, Ms. Jude Martin has selected to proceed with a conservative plan of treatment consisting of: the use of protective splints, activity modification, and the judicious use of over-the-counter anti-inflammatory medications if allowed by her primary care physician. An appropriately sized Removable Carpal Tunnel Splint was provided. Instructions were given regarding splint use and wear as well as suggestions for use of the other modalities were discussed.   I have clearly explained to her that the above outlined treatment plan should not be expected to 'cure' her carpal tunnel syndrome, but we are rather treating the symptoms with which she presents. She has understood that in order to achieve more durable relief of her symptoms and to prevent future worsening or further damage, that definitive surgical treatment would be required. Ms. Marguerite Menezes  voiced an appropriate understanding of our discussion, the options available to her, and of the expectations of her selected  treatment. We discussed that the symptom relief that she experienced from her diagnostic injection indicates that her carpal tunnel syndrome has likely recurred. At this time we are not able to give her any more cortisone injections for her right carpal tunnel syndrome because she has already received a total of 4 , including the 3 that she received prior to her surgery in 2014. I have discussed with her that her current options are to use a carpal tunnel splint or further discuss a release for recurrent carpal tunnel syndrome. At this time she wishes to proceed conservatively. I have had a thorough discussion with Ms. Marguerite Menezes regarding the treatment options available for her initially presenting Right Middle Finger stenosing tenosynovitis, which is causing her functional limitations. I have outlined for Ms. Marguerite Menezes the benefits and consequences of the various treatment modalities, including a reasonable expectation for the long term success and the likelihood that further more aggressive treatment may be required for her current presenting condition. Based upon our current discussion and a reasonable understating of the options available to her, Ms. Marguerite Menezes has selected to proceed with  an injection to the right Middle Finger Flexor Tendon Sheath. I have outlined for her the nature of the injection, and the pre, shelby and post injection considerations and the appropriate expectations for this injection.  I have clearly explained to her that the above outlined treatment plan should not be expected to 'cure' her stenosing tenosynovitis, but we are rather treating the symptoms with which she presents. She has understood that in order to achieve long lasting relief of her symptoms and to prevent future worsening or further damage, that definitive surgical treatment would be required. Ms. Shannan Gloria  voiced an appropriate understanding of our discussion, the options available to her, and of the expectations of her selected  treatment. She did wish to proceed with Right Middle Finger Flexor Tendon Sheath injection. Procedure:  right Middle Finger Trigger Finger Injection  [first Injection]: After full discussion of the nature of this process and outlining a treatment plan with Ms. Shannan Gloria, we discussed the complications, limitations, expectations, alternatives, and risks of injection of the flexor tendon sheath. She understood this information well and verbally consented to this treatment. The skin of the symptomatic digit was prepped with Isopropyl Alcohol and under aseptic conditions the flexor tendon sheath was injected with a combination of 1/2 ml of 0.25% Bupivacaine without Epinephrine and 20 mg of Triamcinolone (40 mg/ml). Good filling of the flexor sheath was noted. A dry sterile bandage was applied and the patient tolerated the injection without difficulty. I advised the patient of the expected response, possible reactions and the instructions for care of the hand. I have also discussed with Ms. Shannan Gloria the other treatment options available to her for this condition. We have today selected to proceed with treatment by injection with steroid medication. She and I have agreed that if our current course of Injection treatment does not prove to be effective over the short term future, that she will schedule a follow-up appointment to discuss and select an alternate course of therapy including possibly further conservative treatment or surgical treatment.        Ms. Shannan Gloria has been given a full verbal list of instructions and precautions related to her present condition. I have asked her to followup with me in the office at the prescribed time. She is also specifically requested to call or return to the office sooner if her symptoms change or worsen prior to the next scheduled appointment.

## 2021-01-11 NOTE — PATIENT INSTRUCTIONS
Information & Instructions   After Finger, Hand, Wrist, or Elbow Injection    Neeraj Alonso MD    You have received an injection of local anesthetic (Bupivicaine without Epinephrine) for comfort & a steroid (Kenalog) for its strong anti-inflammatory effects. In order to give the medication a chance to reduce your inflammation and discomfort, it is recommended that you take it easy for a day or so. You may use your hand and arm as you feel comfortable, but you should avoid highly strenuous activity and heavy use for several days. Relief from the injection will often not begin for several days, and you may not feel full relief for up to one month. It is not uncommon to experience some local discomfort or pain at or around the injection site for a few days. To relieve these symptoms you may do the following if you feel necessary:       Apply ice to the affected area 20 minutes on and 20 minutes off. Do not apply ice directly to the skin. Use a thin layer (T-shirt, pillowcase, towel, etc.) to protect the skin. - If allowed by your other medical physicians, you may take -     2 Tylenol extra strength tablets every 4-6 hours       1-2 Aleve tablets twice a day     2-3 Advil tablets two to three times a day    If you are diabetic, the steroid medication may increase your blood sugar, so you are advised to monitor your sugar more closely so you can adjust it accordingly for a few days following your injection. If you need assistance with the control of you blood sugar, please contact you primary care physician for further advice. I will request that you please call the office one month after your injection at [phone] if you have not experienced relief of your symptoms (unless I have instructed you otherwise). If your injection has given you good relief of you symptoms as expected, then you only need to call the office if your symptoms return.

## 2021-01-12 LAB — HBV SURFACE AB TITR SER: 182.1 MIU/ML

## 2021-02-01 ENCOUNTER — OFFICE VISIT (OUTPATIENT)
Dept: ORTHOPEDIC SURGERY | Age: 61
End: 2021-02-01
Payer: MEDICARE

## 2021-02-01 VITALS — TEMPERATURE: 97 F

## 2021-02-01 DIAGNOSIS — Z96.651 HISTORY OF TOTAL KNEE ARTHROPLASTY, RIGHT: Primary | ICD-10-CM

## 2021-02-01 PROCEDURE — 99212 OFFICE O/P EST SF 10 MIN: CPT | Performed by: PHYSICIAN ASSISTANT

## 2021-02-01 NOTE — PROGRESS NOTES
Subjective:      Patient ID: Marylen Hoe is a 64 y.o.  female. Chief Complaint   Patient presents with    Knee Problem     Right TKA 2020        HPI:  Here for post op visit. S/P right knee arthroplasty. The date of procedure- 2020. Pain: 0-2/10      Review of Systems:   Negative for fever or chills.      Past Medical History:   Diagnosis Date    Allergic rhinitis     Arthritis     Asthma     not currently medically treated    Essential hypertension 2018    Hyperlipidemia     Neuropathy     Osteoarthritis     Type 2 diabetes mellitus without complication, without long-term current use of insulin ([address] 2019    Wears glasses        Family History   Problem Relation Age of Onset    Cancer Mother         pancreatic    Cirrhosis Father     High Blood Pressure Father        Past Surgical History:   Procedure Laterality Date    CARPAL TUNNEL RELEASE Right 7-10-14    FOOT SURGERY Right     rods placed    KNEE ARTHROSCOPY Right     MENISCECTOMY Right     TOTAL KNEE ARTHROPLASTY Right 2020    RIGHT TOTAL KNEE REPLACEMENT ROBOTIC ASSISTED performed by Maia Arreguin MD at [address] [place] [address] History     Occupational History    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   Tobacco Use    Smoking status: Former Smoker     Packs/day: 1.50     Years: 9.00     Pack years: 13.50     Types: Cigarettes     Quit date: 2009     Years since quittin.5    Smokeless tobacco: Never Used    Tobacco comment: quit:  7 years ago   Substance and Sexual Activity    Alcohol use: Yes     Comment: rare    Drug use: Never    Sexual activity: Never       Current Outpatient Medications   Medication Sig Dispense Refill    vitamin D (ERGOCALCIFEROL) 1.25 MG (76448 UT) CAPS capsule Take 1 capsule by mouth once a week 4 capsule 2    amLODIPine (NORVASC) 10 MG tablet Take 1 tablet by mouth daily 30 tablet 5    aspirin EC 81 MG EC tablet Take 1 tablet by mouth 2 times daily for 14 days Take for DVT blood clot rpophylaxis. Please avoid missing doses. 30 tablet 5    hydroCHLOROthiazide (HYDRODIURIL) 12.5 MG tablet Take one tab a day stop chlorthalidone 25 mg 30 tablet 3    losartan (COZAAR) 50 MG tablet Take 1 tablet by mouth daily 30 tablet 5    Blood Pressure KIT Ck blood pressure once a week 1 kit 0    metFORMIN (GLUCOPHAGE) 500 MG tablet Take 1 tablet by mouth 2 times daily (with meals) 60 tablet 5    tiZANidine (ZANAFLEX) 4 MG tablet Take 1 tablet by mouth 4 times daily as needed (prn spasm) 40 tablet 0    atorvastatin (LIPITOR) 40 MG tablet Take 1 tablet by mouth daily 30 tablet 5    fluticasone (FLONASE) 50 MCG/ACT nasal spray 1 spray by Nasal route daily 1 Bottle 3    acetaminophen (TYLENOL) 500 MG tablet Take 1,000 mg by mouth every 4 hours as needed for Pain       No current facility-administered medications for this visit. Objective:   She is alert, oriented x 3, pleasant, well nourished, developed and in no acute distress. Temp 97 °F (36.1 °C) (Temporal)   LMP 06/01/2014      Examination of the right knee:   Incision: Healed uneventfully. There is no swelling. The overall alignment of the knee is neutral.  AROM     Extension 0     Flexion  125   There is no instability with varus/valgus stress testing in full extension or mid flexion. There is no instability with anterior drawer testing. Extensor Mechanism is intact. Examination of the lower extremities are intact with sensation to light touch. Motor testing  5/5 in all major motor groups of the lower extremities. Examination of the lower extremities shows intact perfusion to all extremities. No cyanosis. Digits are warm to touch, capillary refill is less than 2 seconds. There is no edema noted. Examination of the skin over both lower extremities reveals: The skin to be intact without lacerations or abrasions. No significant erythema. No rashes or skin lesions.         X Rays: was performed in the office today:   AP Standing, Lateral and Sunrise Left Knee: There is a left uncemeted knee arthroplasty present. The alignment is satisfactory. There are no signs of failure or loosening. Diagnosis:        ICD-10-CM    1. History of total knee arthroplasty, right 2/12/2020  Z96.651 XR KNEE RIGHT (3 VIEWS)          Assessment/ Plan:       Assessment:    Clinically and radiographically stable left knee arthroplasty almost 1 year post op. 10 minutes was the total time spent on today's visit  including reviewing test results, obtaining or reviewing history, physical exam, time spent on documentation or ordering prescriptions, tests and procedures after the visit. Plan:    Continue with physical therapy/ rehab for knee arthroplasty per protocol. A home exercise program was reviewed today including ROM exercises and strengthening exercises. The patient verbalized understanding of these exercises as well as the importance of the exercise program to promote normal function. Activity restrictions discussed today. Need for prophylactic antibiotics discussed for dental and surgical procedures. Follow Up: 12 months. Call or return to clinic prn if these symptoms worsen or fail to improve as anticipated.

## 2021-06-25 ENCOUNTER — OFFICE VISIT (OUTPATIENT)
Dept: PRIMARY CARE CLINIC | Age: 61
End: 2021-06-25
Payer: MEDICARE

## 2021-06-25 VITALS
SYSTOLIC BLOOD PRESSURE: 151 MMHG | HEART RATE: 67 BPM | TEMPERATURE: 97 F | BODY MASS INDEX: 38.05 KG/M2 | OXYGEN SATURATION: 96 % | WEIGHT: 201.4 LBS | DIASTOLIC BLOOD PRESSURE: 93 MMHG

## 2021-06-25 DIAGNOSIS — E66.01 MORBIDLY OBESE (HCC): ICD-10-CM

## 2021-06-25 DIAGNOSIS — I10 ESSENTIAL HYPERTENSION: ICD-10-CM

## 2021-06-25 DIAGNOSIS — E11.9 TYPE 2 DIABETES MELLITUS WITHOUT COMPLICATION, WITHOUT LONG-TERM CURRENT USE OF INSULIN (HCC): ICD-10-CM

## 2021-06-25 DIAGNOSIS — E78.2 MIXED HYPERLIPIDEMIA: ICD-10-CM

## 2021-06-25 DIAGNOSIS — E55.9 VITAMIN D DEFICIENCY: ICD-10-CM

## 2021-06-25 DIAGNOSIS — Z12.11 SCREEN FOR COLON CANCER: ICD-10-CM

## 2021-06-25 DIAGNOSIS — I10 ESSENTIAL HYPERTENSION: Primary | ICD-10-CM

## 2021-06-25 DIAGNOSIS — Z78.9 IMMUNE TO HEPATITIS B: ICD-10-CM

## 2021-06-25 DIAGNOSIS — Z98.890 S/P COLONOSCOPY WITH POLYPECTOMY: ICD-10-CM

## 2021-06-25 DIAGNOSIS — J30.2 SEASONAL ALLERGIES: ICD-10-CM

## 2021-06-25 DIAGNOSIS — Z92.29 HISTORY OF RECEIPT OF HEPATITIS B IMMUNE GLOBULIN: ICD-10-CM

## 2021-06-25 LAB
ANION GAP SERPL CALCULATED.3IONS-SCNC: 11 MMOL/L (ref 3–16)
AST SERPL-CCNC: 18 U/L (ref 15–37)
BUN BLDV-MCNC: 8 MG/DL (ref 7–20)
CALCIUM SERPL-MCNC: 9.8 MG/DL (ref 8.3–10.6)
CHLORIDE BLD-SCNC: 103 MMOL/L (ref 99–110)
CHOLESTEROL, FASTING: 198 MG/DL (ref 0–199)
CO2: 29 MMOL/L (ref 21–32)
CREAT SERPL-MCNC: 0.9 MG/DL (ref 0.6–1.1)
GFR AFRICAN AMERICAN: >60
GFR NON-AFRICAN AMERICAN: >60
GLUCOSE BLD-MCNC: 93 MG/DL (ref 70–99)
HBA1C MFR BLD: 5.8 %
HCT VFR BLD CALC: 43 % (ref 36–48)
HDLC SERPL-MCNC: 53 MG/DL (ref 40–60)
HEMOGLOBIN: 13.7 G/DL (ref 12–16)
LDL CHOLESTEROL CALCULATED: 104 MG/DL
MCH RBC QN AUTO: 26.9 PG (ref 26–34)
MCHC RBC AUTO-ENTMCNC: 31.8 G/DL (ref 31–36)
MCV RBC AUTO: 84.5 FL (ref 80–100)
PDW BLD-RTO: 16 % (ref 12.4–15.4)
PLATELET # BLD: 220 K/UL (ref 135–450)
PMV BLD AUTO: 9.6 FL (ref 5–10.5)
POTASSIUM SERPL-SCNC: 4.9 MMOL/L (ref 3.5–5.1)
RBC # BLD: 5.09 M/UL (ref 4–5.2)
SODIUM BLD-SCNC: 143 MMOL/L (ref 136–145)
TRIGLYCERIDE, FASTING: 207 MG/DL (ref 0–150)
VITAMIN D 25-HYDROXY: 23.3 NG/ML
VLDLC SERPL CALC-MCNC: 41 MG/DL
WBC # BLD: 7.8 K/UL (ref 4–11)

## 2021-06-25 PROCEDURE — 83036 HEMOGLOBIN GLYCOSYLATED A1C: CPT | Performed by: FAMILY MEDICINE

## 2021-06-25 PROCEDURE — G8417 CALC BMI ABV UP PARAM F/U: HCPCS | Performed by: FAMILY MEDICINE

## 2021-06-25 PROCEDURE — 1036F TOBACCO NON-USER: CPT | Performed by: FAMILY MEDICINE

## 2021-06-25 PROCEDURE — 3017F COLORECTAL CA SCREEN DOC REV: CPT | Performed by: FAMILY MEDICINE

## 2021-06-25 PROCEDURE — 3046F HEMOGLOBIN A1C LEVEL >9.0%: CPT | Performed by: FAMILY MEDICINE

## 2021-06-25 PROCEDURE — G8427 DOCREV CUR MEDS BY ELIG CLIN: HCPCS | Performed by: FAMILY MEDICINE

## 2021-06-25 PROCEDURE — 99214 OFFICE O/P EST MOD 30 MIN: CPT | Performed by: FAMILY MEDICINE

## 2021-06-25 PROCEDURE — 2022F DILAT RTA XM EVC RTNOPTHY: CPT | Performed by: FAMILY MEDICINE

## 2021-06-25 RX ORDER — TIZANIDINE 4 MG/1
4 TABLET ORAL 4 TIMES DAILY PRN
Qty: 40 TABLET | Refills: 0 | Status: SHIPPED | OUTPATIENT
Start: 2021-06-25 | End: 2021-06-25

## 2021-06-25 RX ORDER — HYDROCHLOROTHIAZIDE 12.5 MG/1
TABLET ORAL
Qty: 30 TABLET | Refills: 3 | Status: SHIPPED
Start: 2021-06-25 | End: 2021-08-16 | Stop reason: DRUGHIGH

## 2021-06-25 RX ORDER — AMLODIPINE BESYLATE 10 MG/1
10 TABLET ORAL DAILY
Qty: 30 TABLET | Refills: 5 | Status: SHIPPED | OUTPATIENT
Start: 2021-06-25 | End: 2021-08-16 | Stop reason: SDUPTHER

## 2021-06-25 RX ORDER — FLUTICASONE PROPIONATE 50 MCG
1 SPRAY, SUSPENSION (ML) NASAL DAILY
Qty: 1 BOTTLE | Refills: 3 | Status: SHIPPED | OUTPATIENT
Start: 2021-06-25 | End: 2022-04-13

## 2021-06-25 RX ORDER — ATORVASTATIN CALCIUM 40 MG/1
40 TABLET, FILM COATED ORAL DAILY
Qty: 30 TABLET | Refills: 5 | Status: SHIPPED | OUTPATIENT
Start: 2021-06-25 | End: 2021-08-16 | Stop reason: SDUPTHER

## 2021-06-25 RX ORDER — ASPIRIN 81 MG/1
81 TABLET ORAL 2 TIMES DAILY
Qty: 30 TABLET | Refills: 5 | Status: SHIPPED | OUTPATIENT
Start: 2021-06-25 | End: 2021-09-29

## 2021-06-25 RX ORDER — ERGOCALCIFEROL 1.25 MG/1
50000 CAPSULE ORAL WEEKLY
Qty: 4 CAPSULE | Refills: 2 | Status: SHIPPED | OUTPATIENT
Start: 2021-06-25 | End: 2021-08-16 | Stop reason: SDUPTHER

## 2021-06-25 RX ORDER — LOSARTAN POTASSIUM 50 MG/1
50 TABLET ORAL DAILY
Qty: 30 TABLET | Refills: 5 | Status: SHIPPED | OUTPATIENT
Start: 2021-06-25 | End: 2021-08-16 | Stop reason: SDUPTHER

## 2021-06-25 SDOH — ECONOMIC STABILITY: FOOD INSECURITY: WITHIN THE PAST 12 MONTHS, YOU WORRIED THAT YOUR FOOD WOULD RUN OUT BEFORE YOU GOT MONEY TO BUY MORE.: NEVER TRUE

## 2021-06-25 SDOH — ECONOMIC STABILITY: FOOD INSECURITY: WITHIN THE PAST 12 MONTHS, THE FOOD YOU BOUGHT JUST DIDN'T LAST AND YOU DIDN'T HAVE MONEY TO GET MORE.: NEVER TRUE

## 2021-06-25 ASSESSMENT — PATIENT HEALTH QUESTIONNAIRE - PHQ9
SUM OF ALL RESPONSES TO PHQ9 QUESTIONS 1 & 2: 0
SUM OF ALL RESPONSES TO PHQ QUESTIONS 1-9: 0
1. LITTLE INTEREST OR PLEASURE IN DOING THINGS: 0
2. FEELING DOWN, DEPRESSED OR HOPELESS: 0
SUM OF ALL RESPONSES TO PHQ QUESTIONS 1-9: 0
SUM OF ALL RESPONSES TO PHQ QUESTIONS 1-9: 0

## 2021-06-25 ASSESSMENT — SOCIAL DETERMINANTS OF HEALTH (SDOH): HOW HARD IS IT FOR YOU TO PAY FOR THE VERY BASICS LIKE FOOD, HOUSING, MEDICAL CARE, AND HEATING?: NOT HARD AT ALL

## 2021-06-25 NOTE — PROGRESS NOTES
CC 3 month follow up    HPI 62 y/o female PMH htn, dm-2, hld, and seasonal allergies    She has not been taking meds    Hypertension  No chest pain, headache, sob, leg edema, stroke, kidney disease     Hyperlipidemia  No muscle aches or muscle weakness or cramps since last visit. Diabetes type 2  Last AIC 6.0  Does not check glucose    Seasonal allergies  Intermittent congestion  No current treatment needed    Morbid obese  Wt Readings from Last 3 Encounters:   06/25/21 201 lb 6.4 oz (91.4 kg)   01/11/21 205 lb (93 kg)   12/08/20 205 lb (93 kg)       Physical Exam  Constitutional:       General: She is not in acute distress. Appearance: She is well-developed. She is not diaphoretic. HENT:      Head: Normocephalic and atraumatic. Right Ear: External ear normal.      Left Ear: External ear normal.      Nose: Nose normal.      Mouth/Throat:      Pharynx: No oropharyngeal exudate. Eyes:      General: No scleral icterus. Left eye: No discharge. Conjunctiva/sclera: Conjunctivae normal.      Pupils: Pupils are equal, round, and reactive to light. Neck:      Thyroid: No thyromegaly. Vascular: No JVD. Trachea: No tracheal deviation. Cardiovascular:      Rate and Rhythm: Normal rate and regular rhythm. Heart sounds: Normal heart sounds. No murmur heard. No friction rub. No gallop. Pulmonary:      Effort: Pulmonary effort is normal. No respiratory distress. Breath sounds: Normal breath sounds. No stridor. No wheezing or rales. Chest:      Chest wall: No tenderness. Abdominal:      General: Bowel sounds are normal. There is no distension. Palpations: Abdomen is soft. There is no mass. Tenderness: There is no abdominal tenderness. There is no guarding or rebound. Musculoskeletal:         General: No tenderness. Normal range of motion. Cervical back: Normal range of motion and neck supple. Lymphadenopathy:      Cervical: No cervical adenopathy. Skin:     General: Skin is warm and dry. Coloration: Skin is not pale. Findings: No erythema or rash. Neurological:      Mental Status: She is alert and oriented to person, place, and time. Cranial Nerves: No cranial nerve deficit. Coordination: Coordination normal.      Deep Tendon Reflexes: Reflexes are normal and symmetric. Reflexes normal.   Psychiatric:         Behavior: Behavior normal.         Thought Content: Thought content normal.         Judgment: Judgment normal.           Visual inspection:  Deformity/amputation: absent  Skin lesions/pre-ulcerative calluses: absent  Edema: right- negative, left- negative    Sensory exam:  Monofilament sensation: normal  (minimum of 5 random plantar locations tested, avoiding callused areas - > 1 area with absence of sensation is + for neuropathy)    Plus at least one of the following:  Pulses: normal,   Pinprick: Intact  Proprioception: Intact  Vibration (128 Hz): Intact    1. Essential hypertension  bp not at goal < 130/80  Restart meds bp ck 4 weeks, labs, low salt  - amLODIPine (NORVASC) 10 MG tablet; Take 1 tablet by mouth daily  Dispense: 30 tablet; Refill: 5  - aspirin EC 81 MG EC tablet; Take 1 tablet by mouth 2 times daily for 14 days Take for DVT blood clot rpophylaxis. Please avoid missing doses. Dispense: 30 tablet; Refill: 5  - hydroCHLOROthiazide (HYDRODIURIL) 12.5 MG tablet; Take one tab a day stop chlorthalidone 25 mg  Dispense: 30 tablet; Refill: 3  - Basic Metabolic Panel; Future  - Lipid, Fasting; Future  - CBC; Future  - URINALYSIS    2. Type 2 diabetes mellitus without complication, without long-term current use of insulin ([facility])  AIC 6.0 to 5.8  Cont metformin  - aspirin EC 81 MG EC tablet; Take 1 tablet by mouth 2 times daily for 14 days Take for DVT blood clot rpophylaxis. Please avoid missing doses. Dispense: 30 tablet; Refill: 5  - atorvastatin (LIPITOR) 40 MG tablet;  Take 1 tablet by mouth daily  Dispense: 30 tablet; Refill: 5  - metFORMIN (GLUCOPHAGE) 500 MG tablet; Take 1 tablet by mouth 2 times daily (with meals)  Dispense: 60 tablet; Refill: 5  - losartan (COZAAR) 50 MG tablet; Take 1 tablet by mouth daily  Dispense: 30 tablet; Refill: 5  - POCT glycosylated hemoglobin (Hb A1C)  - Basic Metabolic Panel; Future  - Lipid, Fasting; Future  - HM DIABETES FOOT EXAM    3. Mixed hyperlipidemia  Ck lipids  Restart statin  - aspirin EC 81 MG EC tablet; Take 1 tablet by mouth 2 times daily for 14 days Take for DVT blood clot rpophylaxis. Please avoid missing doses. Dispense: 30 tablet; Refill: 5  - atorvastatin (LIPITOR) 40 MG tablet; Take 1 tablet by mouth daily  Dispense: 30 tablet; Refill: 5  - AST; Future  - Lipid, Fasting; Future    4. Seasonal allergies  Stable prn   - fluticasone (FLONASE) 50 MCG/ACT nasal spray; 1 spray by Nasal route daily  Dispense: 1 Bottle; Refill: 3    5. Vitamin D deficiency  Ck level  repllace  - vitamin D (ERGOCALCIFEROL) 1.25 MG (79996 UT) CAPS capsule; Take 1 capsule by mouth once a week  Dispense: 4 capsule; Refill: 2  - VITAMIN D 25 HYDROXY; Future    Mediterranean diet  exercise    7. S/P colonoscopy with polypectomy    - Manju Chapman MD, Gastroenterology, [facility]    8.  Screen for colon cancer

## 2021-06-25 NOTE — PATIENT INSTRUCTIONS
Instructions. \"     If you do not have an account, please click on the \"Sign Up Now\" link. Current as of: December 17, 2020               Content Version: 12.9  © 2006-2021 Healthwise, Incorporated. Care instructions adapted under license by [facility] ([facility]). If you have questions about a medical condition or this instruction, always ask your healthcare professional. [address] any warranty or liability for your use of this information. Patient Education        Learning About Low-Sodium Foods  What foods are low in sodium? The foods you eat contain nutrients, such as vitamins and minerals. Sodium is a nutrient. Your body needs the right amount to stay healthy and work as it should. You can use the list below to help you make choices about which foods to eat. Fruits  · Fresh, frozen, canned, or dried fruit  Vegetables  · Fresh or frozen vegetables, with no added salt  · Canned vegetables, low-sodium or with no added salt  Grains  · Bagels without salted tops  · Cereal with no added salt  · Corn tortillas  · Crackers with no added salt  · Oatmeal, cooked without salt  · Popcorn with no salt  · Pasta and noodles, cooked without salt  · Rice, cooked without salt  · Unsalted pretzels  Dairy and dairy alternatives  · Butter, unsalted  · Cream cheese  · Ice cream  · Milk  · Soy milk  Meats and other protein foods  · Beans and peas, canned with no salt  · Eggs  · Fresh fish (not smoked)  · Fresh meats (not smoked or cured)  · Nuts and nut butter, prepared without salt  · Poultry, not packaged with sodium solution  · Tofu, unseasoned  · Tuna, canned without salt  Seasonings  · Garlic  · Herbs and spices  · Lemon juice  · Mustard  · Olive oil  · Salt-free seasoning mixes  · Vinegar  Work with your doctor to find out how much of this nutrient you need. Depending on your health, you may need more or less of it in your diet. Where can you learn more?   Go to [email or web address].[email or web address] org and sign in to your [employer] account. Enter K760 in the [facility] box to learn more about \"Learning About Low-Sodium Foods. \"     If you do not have an account, please click on the \"Sign Up Now\" link. Current as of: December 17, 2020               Content Version: 12.9  © 2006-2021 [employer]. Care instructions adapted under license by [facility] ([facility]). If you have questions about a medical condition or this instruction, always ask your healthcare professional. [place] [address] any warranty or liability for your use of this information. Patient Education        How to Read a Food Label to Limit Sodium: Care Instructions  Overview  Limiting sodium can be an important part of managing some health problems. Processed foods, fast food, and restaurant foods are the major sources of dietary sodium. The most common name for sodium is salt. Most packaged foods have a Nutrition Facts label. This will tell you how much sodium is in one serving of food. Follow-up care is a key part of your treatment and safety. Be sure to make and go to all appointments, and call your doctor if you are having problems. It's also a good idea to know your test results and keep a list of the medicines you take. How can you care for yourself at home? Read ingredient lists on food labels  · Read the list of ingredients on food labels to help you find how much sodium is in a food. The label lists the ingredients in a food in descending order (from the most to the least). If salt or sodium is high on the list, there may be a lot of sodium in the food. · Know that sodium has different names. Sodium is also called monosodium glutamate (MSG, common in [facility] food), sodium citrate, sodium alginate, and sodium phosphate. Read Nutrition Facts labels  · On most foods, there is a Nutrition Facts label.  This will tell you how much sodium is in one serving of food. Look at both the serving size and the sodium amount. The serving size is located at the top of the label, usually right under the \"Nutrition Facts\" title. The amount of sodium is given in the list under the title. It is given in milligrams (mg). ? Check the serving size carefully. A single serving is often very small, and you may eat more than one serving. If this is the case, you will eat more sodium than listed on the label. For example, if the serving size for a canned soup is 1 cup and the sodium amount is 470 mg, if you have 2 cups you will eat 940 mg of sodium. · The nutrition facts for fresh fruits and vegetables are not listed on the food. They may be listed somewhere in the store. These foods usually have no sodium or low sodium. · The Nutrition Facts label also gives you the Percent Daily Value for sodium. This is how much of the recommended amount of sodium a serving contains. The daily value for sodium is less than 2,300 mg. So if the Percent Daily Value says 50%, this means one serving is giving you half of this, or 1,150 mg. Buy low-sodium foods  · Look for foods that are made with less sodium. Watch for the following words on the label. ? \"Unsalted\" means there is no sodium added to the food. But there may be sodium already in the food naturally. ? \"Sodium-free\" means a serving has less than 5 mg of sodium. ? \"Very low sodium\" means a serving has 35 mg or less of sodium. ? \"Low-sodium\" means a serving has 140 mg or less of sodium. · \"Reduced-sodium\" means that there is 25% less sodium than what the food normally has. This is still usually too much sodium. Try not to buy foods with this on the label. · Buy fresh vegetables, or frozen vegetables without added sauces. Buy low-sodium versions of canned vegetables, soups, and other canned goods. Where can you learn more? Go to https://stephania.[employer]. org and sign in to your [employer] account.  Enter [id number] in the [address] piece of fruit, ½ cup chopped or canned fruit, 1/4 cup dried fruit, or 4 ounces (½ cup) of fruit juice. Choose fruit more often than fruit juice. · Eat 4 to 5 servings of vegetables each day. A serving is 1 cup of lettuce or raw leafy vegetables, ½ cup of chopped or cooked vegetables, or 4 ounces (½ cup) of vegetable juice. Choose vegetables more often than vegetable juice. · Get 2 to 3 servings of low-fat and fat-free dairy each day. A serving is 8 ounces of milk, 1 cup of yogurt, or 1 ½ ounces of cheese. · Eat 6 to 8 servings of grains each day. A serving is 1 slice of bread, 1 ounce of dry cereal, or ½ cup of cooked rice, pasta, or cooked cereal. Try to choose whole-grain products as much as possible. · Limit lean meat, poultry, and fish to 2 servings each day. A serving is 3 ounces, about the size of a deck of cards. · Eat 4 to 5 servings of nuts, seeds, and legumes (cooked dried beans, lentils, and split peas) each week. A serving is 1/3 cup of nuts, 2 tablespoons of seeds, or ½ cup of cooked beans or peas. · Limit fats and oils to 2 to 3 servings each day. A serving is 1 teaspoon of vegetable oil or 2 tablespoons of salad dressing. · Limit sweets and added sugars to 5 servings or less a week. A serving is 1 tablespoon jelly or jam, ½ cup sorbet, or 1 cup of lemonade. · Eat less than 2,300 milligrams (mg) of sodium a day. If you limit your sodium to 1,500 mg a day, you can lower your blood pressure even more. · Be aware that all of these are the suggested number of servings for people who eat 1,800 to 2,000 calories a day. Your recommended number of servings may be different if you need more or fewer calories. Tips for success  · Start small. Do not try to make dramatic changes to your diet all at once. You might feel that you are missing out on your favorite foods and then be more likely to not follow the plan. Make small changes, and stick with them.  Once those changes become habit, add a few more changes. · Try some of the following:  ? Make it a goal to eat a fruit or vegetable at every meal and at snacks. This will make it easy to get the recommended amount of fruits and vegetables each day. ? Try yogurt topped with fruit and nuts for a snack or healthy dessert. ? Add lettuce, tomato, cucumber, and onion to sandwiches. ? Combine a ready-made pizza crust with low-fat mozzarella cheese and lots of vegetable toppings. Try using tomatoes, squash, spinach, broccoli, carrots, cauliflower, and onions. ? Have a variety of cut-up vegetables with a low-fat dip as an appetizer instead of chips and dip. ? Sprinkle sunflower seeds or chopped almonds over salads. Or try adding chopped walnuts or almonds to cooked vegetables. ? Try some vegetarian meals using beans and peas. Add garbanzo or kidney beans to salads. Make burritos and tacos with mashed little beans or black beans. Where can you learn more? Go to https://[email or web address].[email or web address] org and sign in to your [facility] account. Enter I188 in the [employer]It[employer] box to learn more about \"DASH Diet: Care Instructions. \"     If you do not have an account, please click on the \"Sign Up Now\" link. Current as of: August 31, 2020               Content Version: 12.9  © 6732-2187 [employer]. Care instructions adapted under license by [facility]. If you have questions about a medical condition or this instruction, always ask your healthcare professional. [place] [address] any warranty or liability for your use of this information. Patient Education        Learning About the Mediterranean Diet  What is the [address]? The Mediterranean diet is a style of eating rather than a diet plan. It features foods eaten in [place], [place], [place] and [place], and other countries along the [place].  It emphasizes eating foods like fish, fruits, vegetables, beans, high-fiber breads and whole grains, nuts, and olive oil. This style of eating includes limited red meat, cheese, and sweets. Why choose the Mediterranean diet? A Mediterranean-style diet may improve heart health. It contains more fat than other heart-healthy diets. But the fats are mainly from nuts, unsaturated oils (such as fish oils and olive oil), and certain nut or seed oils (such as canola, soybean, or flaxseed oil). These fats may help protect the heart and blood vessels. How can you get started on the Mediterranean diet? Here are some things you can do to switch to a more Mediterranean way of eating. What to eat  · Eat a variety of fruits and vegetables each day, such as grapes, blueberries, tomatoes, broccoli, peppers, figs, olives, spinach, eggplant, beans, lentils, and chickpeas. · Eat a variety of whole-grain foods each day, such as oats, brown rice, and whole wheat bread, pasta, and couscous. · Eat fish at least 2 times a week. Try tuna, salmon, mackerel, lake trout, herring, or sardines. · Eat moderate amounts of low-fat dairy products, such as milk, cheese, or yogurt. · Eat moderate amounts of poultry and eggs. · Choose healthy (unsaturated) fats, such as nuts, olive oil, and certain nut or seed oils like canola, soybean, and flaxseed. · Limit unhealthy (saturated) fats, such as butter, palm oil, and coconut oil. And limit fats found in animal products, such as meat and dairy products made with whole milk. Try to eat red meat only a few times a month in very small amounts. · Limit sweets and desserts to only a few times a week. This includes sugar-sweetened drinks like soda. The Mediterranean diet may also include red wine with your meal--1 glass each day for women and up to 2 glasses a day for men. Tips for eating at home  · Use herbs, spices, garlic, lemon zest, and citrus juice instead of salt to add flavor to foods. · Add avocado slices to your sandwich instead of neal.   · Have fish for lunch or dinner instead of red

## 2021-08-16 ENCOUNTER — OFFICE VISIT (OUTPATIENT)
Dept: PRIMARY CARE CLINIC | Age: 61
End: 2021-08-16
Payer: MEDICARE

## 2021-08-16 VITALS
WEIGHT: 205 LBS | HEIGHT: 61 IN | HEART RATE: 88 BPM | OXYGEN SATURATION: 97 % | SYSTOLIC BLOOD PRESSURE: 150 MMHG | TEMPERATURE: 97.2 F | BODY MASS INDEX: 38.71 KG/M2 | DIASTOLIC BLOOD PRESSURE: 100 MMHG

## 2021-08-16 DIAGNOSIS — E55.9 VITAMIN D DEFICIENCY: ICD-10-CM

## 2021-08-16 DIAGNOSIS — E78.2 MIXED HYPERLIPIDEMIA: ICD-10-CM

## 2021-08-16 DIAGNOSIS — I10 ESSENTIAL HYPERTENSION: Primary | ICD-10-CM

## 2021-08-16 DIAGNOSIS — E11.9 TYPE 2 DIABETES MELLITUS WITHOUT COMPLICATION, WITHOUT LONG-TERM CURRENT USE OF INSULIN (HCC): ICD-10-CM

## 2021-08-16 PROCEDURE — 3017F COLORECTAL CA SCREEN DOC REV: CPT | Performed by: FAMILY MEDICINE

## 2021-08-16 PROCEDURE — 1036F TOBACCO NON-USER: CPT | Performed by: FAMILY MEDICINE

## 2021-08-16 PROCEDURE — G8427 DOCREV CUR MEDS BY ELIG CLIN: HCPCS | Performed by: FAMILY MEDICINE

## 2021-08-16 PROCEDURE — 2022F DILAT RTA XM EVC RTNOPTHY: CPT | Performed by: FAMILY MEDICINE

## 2021-08-16 PROCEDURE — G8417 CALC BMI ABV UP PARAM F/U: HCPCS | Performed by: FAMILY MEDICINE

## 2021-08-16 PROCEDURE — 99213 OFFICE O/P EST LOW 20 MIN: CPT | Performed by: FAMILY MEDICINE

## 2021-08-16 PROCEDURE — 3044F HG A1C LEVEL LT 7.0%: CPT | Performed by: FAMILY MEDICINE

## 2021-08-16 RX ORDER — AMLODIPINE BESYLATE 10 MG/1
10 TABLET ORAL DAILY
Qty: 30 TABLET | Refills: 5 | Status: SHIPPED | OUTPATIENT
Start: 2021-08-16 | End: 2021-11-10

## 2021-08-16 RX ORDER — ATORVASTATIN CALCIUM 40 MG/1
40 TABLET, FILM COATED ORAL DAILY
Qty: 30 TABLET | Refills: 5 | Status: SHIPPED | OUTPATIENT
Start: 2021-08-16 | End: 2021-11-10 | Stop reason: SDUPTHER

## 2021-08-16 RX ORDER — HYDROCHLOROTHIAZIDE 25 MG/1
25 TABLET ORAL DAILY
Qty: 30 TABLET | Refills: 2 | Status: SHIPPED | OUTPATIENT
Start: 2021-08-16 | End: 2021-11-10 | Stop reason: SDUPTHER

## 2021-08-16 RX ORDER — ERGOCALCIFEROL 1.25 MG/1
50000 CAPSULE ORAL WEEKLY
Qty: 4 CAPSULE | Refills: 2 | Status: SHIPPED | OUTPATIENT
Start: 2021-08-16 | End: 2021-11-10 | Stop reason: SDUPTHER

## 2021-08-16 RX ORDER — LOSARTAN POTASSIUM 50 MG/1
50 TABLET ORAL DAILY
Qty: 30 TABLET | Refills: 5 | Status: SHIPPED | OUTPATIENT
Start: 2021-08-16 | End: 2021-11-10 | Stop reason: SDUPTHER

## 2021-08-16 NOTE — PROGRESS NOTES
CC fu for labs    HPI 62 y/o female known htn, hld, type 2 diabetes, vit d def, hyperlipidemia  Fu for labs today      Her labs are reviwed'  Hypertension  No chest pain, headache, sob, leg edema, stroke, kidney disease       Lab Results   Component Value Date    CHOL 190 02/14/2018    CHOL 204 (H) 05/23/2017    CHOL 184 04/11/2014     Lab Results   Component Value Date    TRIG 157 (H) 02/14/2018    TRIG 153 (H) 05/23/2017    TRIG 86 04/11/2014     Lab Results   Component Value Date    HDL 53 06/25/2021    HDL 55 01/11/2021    HDL 63 (H) 12/12/2019     Lab Results   Component Value Date    LDLCALC 104 (H) 06/25/2021    LDLCALC 108 (H) 01/11/2021    LDLCALC 119 (H) 12/12/2019     Lab Results   Component Value Date    LABVLDL 41 06/25/2021    LABVLDL 23 01/11/2021    LABVLDL 21 12/12/2019     No results found for: CHOLHDLRATIO      1. Essential hypertension  bp not at goal  Increase hctz  Recent labs  - hydroCHLOROthiazide (HYDRODIURIL) 25 MG tablet; Take 1 tablet by mouth daily Cancel hydrochlorothiazide 12.5 mg  Dispense: 30 tablet; Refill: 2  - amLODIPine (NORVASC) 10 MG tablet; Take 1 tablet by mouth daily  Dispense: 30 tablet; Refill: 5    2. Type 2 diabetes mellitus without complication, without long-term current use of insulin (HCC)  AIC 5.8  At goal  - losartan (COZAAR) 50 MG tablet; Take 1 tablet by mouth daily  Dispense: 30 tablet; Refill: 5  - atorvastatin (LIPITOR) 40 MG tablet; Take 1 tablet by mouth daily  Dispense: 30 tablet; Refill: 5    3. Vitamin D deficiency  Level 23  replacement  - vitamin D (ERGOCALCIFEROL) 1.25 MG (62118 UT) CAPS capsule; Take 1 capsule by mouth once a week  Dispense: 4 capsule; Refill: 2    4. Mixed hyperlipidemia  Near goal  Cont. statin  - atorvastatin (LIPITOR) 40 MG tablet; Take 1 tablet by mouth daily  Dispense: 30 tablet;  Refill: 5

## 2021-09-29 ENCOUNTER — HOSPITAL ENCOUNTER (INPATIENT)
Age: 61
LOS: 7 days | Discharge: HOME | DRG: 177 | End: 2021-10-06
Attending: EMERGENCY MEDICINE | Admitting: INTERNAL MEDICINE
Payer: MEDICARE

## 2021-09-29 ENCOUNTER — APPOINTMENT (OUTPATIENT)
Dept: CT IMAGING | Age: 61
DRG: 177 | End: 2021-09-29
Payer: MEDICARE

## 2021-09-29 DIAGNOSIS — R09.02 HYPOXIA: Primary | ICD-10-CM

## 2021-09-29 DIAGNOSIS — U07.1 PNEUMONIA DUE TO COVID-19 VIRUS: ICD-10-CM

## 2021-09-29 DIAGNOSIS — J12.82 PNEUMONIA DUE TO COVID-19 VIRUS: ICD-10-CM

## 2021-09-29 LAB
A/G RATIO: 1.1 (ref 1.1–2.2)
ALBUMIN SERPL-MCNC: 4.3 G/DL (ref 3.4–5)
ALP BLD-CCNC: 62 U/L (ref 40–129)
ALT SERPL-CCNC: 21 U/L (ref 10–40)
ANION GAP SERPL CALCULATED.3IONS-SCNC: 12 MMOL/L (ref 3–16)
AST SERPL-CCNC: 25 U/L (ref 15–37)
BASOPHILS ABSOLUTE: 0 K/UL (ref 0–0.2)
BASOPHILS RELATIVE PERCENT: 0.3 %
BILIRUB SERPL-MCNC: 0.3 MG/DL (ref 0–1)
BUN BLDV-MCNC: 9 MG/DL (ref 7–20)
CALCIUM SERPL-MCNC: 9.2 MG/DL (ref 8.3–10.6)
CHLORIDE BLD-SCNC: 94 MMOL/L (ref 99–110)
CO2: 28 MMOL/L (ref 21–32)
CREAT SERPL-MCNC: 1 MG/DL (ref 0.6–1.1)
EKG ATRIAL RATE: 86 BPM
EKG DIAGNOSIS: NORMAL
EKG P AXIS: 41 DEGREES
EKG P-R INTERVAL: 152 MS
EKG Q-T INTERVAL: 344 MS
EKG QRS DURATION: 78 MS
EKG QTC CALCULATION (BAZETT): 411 MS
EKG R AXIS: -1 DEGREES
EKG T AXIS: 35 DEGREES
EKG VENTRICULAR RATE: 86 BPM
EOSINOPHILS ABSOLUTE: 0 K/UL (ref 0–0.6)
EOSINOPHILS RELATIVE PERCENT: 0.1 %
GFR AFRICAN AMERICAN: >60
GFR NON-AFRICAN AMERICAN: 57
GLOBULIN: 4 G/DL
GLUCOSE BLD-MCNC: 107 MG/DL (ref 70–99)
GLUCOSE BLD-MCNC: 205 MG/DL (ref 70–99)
HCT VFR BLD CALC: 44.7 % (ref 36–48)
HEMOGLOBIN: 14 G/DL (ref 12–16)
LACTIC ACID: 1 MMOL/L (ref 0.4–2)
LYMPHOCYTES ABSOLUTE: 0.7 K/UL (ref 1–5.1)
LYMPHOCYTES RELATIVE PERCENT: 10.4 %
MCH RBC QN AUTO: 25.9 PG (ref 26–34)
MCHC RBC AUTO-ENTMCNC: 31.3 G/DL (ref 31–36)
MCV RBC AUTO: 82.9 FL (ref 80–100)
MONOCYTES ABSOLUTE: 0.5 K/UL (ref 0–1.3)
MONOCYTES RELATIVE PERCENT: 8.6 %
NEUTROPHILS ABSOLUTE: 5.1 K/UL (ref 1.7–7.7)
NEUTROPHILS RELATIVE PERCENT: 80.6 %
PDW BLD-RTO: 15.4 % (ref 12.4–15.4)
PERFORMED ON: ABNORMAL
PLATELET # BLD: 191 K/UL (ref 135–450)
PLATELET SLIDE REVIEW: ADEQUATE
PMV BLD AUTO: 9.9 FL (ref 5–10.5)
POTASSIUM REFLEX MAGNESIUM: 3.8 MMOL/L (ref 3.5–5.1)
PROCALCITONIN: 0.08 NG/ML (ref 0–0.15)
RBC # BLD: 5.39 M/UL (ref 4–5.2)
SLIDE REVIEW: ABNORMAL
SODIUM BLD-SCNC: 134 MMOL/L (ref 136–145)
TOTAL PROTEIN: 8.3 G/DL (ref 6.4–8.2)
TROPONIN: <0.01 NG/ML
WBC # BLD: 6.4 K/UL (ref 4–11)

## 2021-09-29 PROCEDURE — 96374 THER/PROPH/DIAG INJ IV PUSH: CPT

## 2021-09-29 PROCEDURE — 85025 COMPLETE CBC W/AUTO DIFF WBC: CPT

## 2021-09-29 PROCEDURE — 93005 ELECTROCARDIOGRAM TRACING: CPT | Performed by: PHYSICIAN ASSISTANT

## 2021-09-29 PROCEDURE — XW033E5 INTRODUCTION OF REMDESIVIR ANTI-INFECTIVE INTO PERIPHERAL VEIN, PERCUTANEOUS APPROACH, NEW TECHNOLOGY GROUP 5: ICD-10-PCS | Performed by: FAMILY MEDICINE

## 2021-09-29 PROCEDURE — 2700000000 HC OXYGEN THERAPY PER DAY

## 2021-09-29 PROCEDURE — 6360000004 HC RX CONTRAST MEDICATION: Performed by: EMERGENCY MEDICINE

## 2021-09-29 PROCEDURE — 93010 ELECTROCARDIOGRAM REPORT: CPT | Performed by: INTERNAL MEDICINE

## 2021-09-29 PROCEDURE — 1200000000 HC SEMI PRIVATE

## 2021-09-29 PROCEDURE — 6360000002 HC RX W HCPCS: Performed by: INTERNAL MEDICINE

## 2021-09-29 PROCEDURE — 84145 PROCALCITONIN (PCT): CPT

## 2021-09-29 PROCEDURE — 84484 ASSAY OF TROPONIN QUANT: CPT

## 2021-09-29 PROCEDURE — 71260 CT THORAX DX C+: CPT

## 2021-09-29 PROCEDURE — 83605 ASSAY OF LACTIC ACID: CPT

## 2021-09-29 PROCEDURE — 6360000002 HC RX W HCPCS: Performed by: PHYSICIAN ASSISTANT

## 2021-09-29 PROCEDURE — 6370000000 HC RX 637 (ALT 250 FOR IP): Performed by: INTERNAL MEDICINE

## 2021-09-29 PROCEDURE — 80053 COMPREHEN METABOLIC PANEL: CPT

## 2021-09-29 PROCEDURE — 2580000003 HC RX 258: Performed by: INTERNAL MEDICINE

## 2021-09-29 PROCEDURE — 94761 N-INVAS EAR/PLS OXIMETRY MLT: CPT

## 2021-09-29 PROCEDURE — 99284 EMERGENCY DEPT VISIT MOD MDM: CPT

## 2021-09-29 RX ORDER — LOSARTAN POTASSIUM 50 MG/1
50 TABLET ORAL DAILY
Status: DISCONTINUED | OUTPATIENT
Start: 2021-09-29 | End: 2021-10-06 | Stop reason: HOSPADM

## 2021-09-29 RX ORDER — ONDANSETRON 4 MG/1
4 TABLET, ORALLY DISINTEGRATING ORAL EVERY 8 HOURS PRN
Status: DISCONTINUED | OUTPATIENT
Start: 2021-09-29 | End: 2021-10-06 | Stop reason: HOSPADM

## 2021-09-29 RX ORDER — DEXAMETHASONE SODIUM PHOSPHATE 4 MG/ML
6 INJECTION, SOLUTION INTRA-ARTICULAR; INTRALESIONAL; INTRAMUSCULAR; INTRAVENOUS; SOFT TISSUE ONCE
Status: COMPLETED | OUTPATIENT
Start: 2021-09-29 | End: 2021-09-29

## 2021-09-29 RX ORDER — SODIUM CHLORIDE 9 MG/ML
25 INJECTION, SOLUTION INTRAVENOUS PRN
Status: DISCONTINUED | OUTPATIENT
Start: 2021-09-29 | End: 2021-10-06 | Stop reason: HOSPADM

## 2021-09-29 RX ORDER — AMLODIPINE BESYLATE 10 MG/1
10 TABLET ORAL DAILY
Status: DISCONTINUED | OUTPATIENT
Start: 2021-09-29 | End: 2021-10-06 | Stop reason: HOSPADM

## 2021-09-29 RX ORDER — ASPIRIN 81 MG/1
81 TABLET, CHEWABLE ORAL DAILY
Status: DISCONTINUED | OUTPATIENT
Start: 2021-09-29 | End: 2021-10-06 | Stop reason: HOSPADM

## 2021-09-29 RX ORDER — HYDROCHLOROTHIAZIDE 25 MG/1
25 TABLET ORAL DAILY
Status: DISCONTINUED | OUTPATIENT
Start: 2021-09-29 | End: 2021-10-06 | Stop reason: HOSPADM

## 2021-09-29 RX ORDER — ONDANSETRON 2 MG/ML
4 INJECTION INTRAMUSCULAR; INTRAVENOUS EVERY 6 HOURS PRN
Status: DISCONTINUED | OUTPATIENT
Start: 2021-09-29 | End: 2021-10-06 | Stop reason: HOSPADM

## 2021-09-29 RX ORDER — POLYETHYLENE GLYCOL 3350 17 G/17G
17 POWDER, FOR SOLUTION ORAL DAILY PRN
Status: DISCONTINUED | OUTPATIENT
Start: 2021-09-29 | End: 2021-10-06 | Stop reason: HOSPADM

## 2021-09-29 RX ORDER — ASPIRIN 81 MG/1
81 TABLET, CHEWABLE ORAL DAILY
COMMUNITY

## 2021-09-29 RX ORDER — ATORVASTATIN CALCIUM 40 MG/1
40 TABLET, FILM COATED ORAL DAILY
Status: DISCONTINUED | OUTPATIENT
Start: 2021-09-29 | End: 2021-10-06 | Stop reason: HOSPADM

## 2021-09-29 RX ORDER — ACETAMINOPHEN 650 MG/1
650 SUPPOSITORY RECTAL EVERY 6 HOURS PRN
Status: DISCONTINUED | OUTPATIENT
Start: 2021-09-29 | End: 2021-10-06 | Stop reason: HOSPADM

## 2021-09-29 RX ORDER — DEXAMETHASONE 6 MG/1
6 TABLET ORAL DAILY
Status: DISCONTINUED | OUTPATIENT
Start: 2021-09-29 | End: 2021-10-06 | Stop reason: HOSPADM

## 2021-09-29 RX ORDER — ERGOCALCIFEROL 1.25 MG/1
50000 CAPSULE ORAL WEEKLY
Status: DISCONTINUED | OUTPATIENT
Start: 2021-10-02 | End: 2021-10-06 | Stop reason: HOSPADM

## 2021-09-29 RX ORDER — SODIUM CHLORIDE 0.9 % (FLUSH) 0.9 %
5-40 SYRINGE (ML) INJECTION PRN
Status: DISCONTINUED | OUTPATIENT
Start: 2021-09-29 | End: 2021-10-06 | Stop reason: HOSPADM

## 2021-09-29 RX ORDER — SODIUM CHLORIDE 0.9 % (FLUSH) 0.9 %
5-40 SYRINGE (ML) INJECTION EVERY 12 HOURS SCHEDULED
Status: DISCONTINUED | OUTPATIENT
Start: 2021-09-29 | End: 2021-10-06 | Stop reason: HOSPADM

## 2021-09-29 RX ORDER — ACETAMINOPHEN 325 MG/1
650 TABLET ORAL EVERY 6 HOURS PRN
Status: DISCONTINUED | OUTPATIENT
Start: 2021-09-29 | End: 2021-10-06 | Stop reason: HOSPADM

## 2021-09-29 RX ORDER — FLUTICASONE PROPIONATE 50 MCG
1 SPRAY, SUSPENSION (ML) NASAL DAILY
Status: DISCONTINUED | OUTPATIENT
Start: 2021-09-30 | End: 2021-10-06 | Stop reason: HOSPADM

## 2021-09-29 RX ADMIN — SODIUM CHLORIDE, PRESERVATIVE FREE 10 ML: 5 INJECTION INTRAVENOUS at 21:16

## 2021-09-29 RX ADMIN — ASPIRIN 81 MG CHEWABLE TABLET 81 MG: 81 TABLET CHEWABLE at 21:13

## 2021-09-29 RX ADMIN — DEXAMETHASONE SODIUM PHOSPHATE 6 MG: 4 INJECTION, SOLUTION INTRAMUSCULAR; INTRAVENOUS at 12:48

## 2021-09-29 RX ADMIN — HYDROCHLOROTHIAZIDE 25 MG: 25 TABLET ORAL at 21:14

## 2021-09-29 RX ADMIN — ACETAMINOPHEN 650 MG: 325 TABLET ORAL at 18:49

## 2021-09-29 RX ADMIN — LOSARTAN POTASSIUM 50 MG: 50 TABLET, FILM COATED ORAL at 21:13

## 2021-09-29 RX ADMIN — ATORVASTATIN CALCIUM 40 MG: 40 TABLET, FILM COATED ORAL at 21:14

## 2021-09-29 RX ADMIN — AMLODIPINE BESYLATE 10 MG: 10 TABLET ORAL at 21:14

## 2021-09-29 RX ADMIN — ENOXAPARIN SODIUM 40 MG: 40 INJECTION SUBCUTANEOUS at 21:14

## 2021-09-29 RX ADMIN — IOPAMIDOL 75 ML: 755 INJECTION, SOLUTION INTRAVENOUS at 13:02

## 2021-09-29 ASSESSMENT — ENCOUNTER SYMPTOMS
TROUBLE SWALLOWING: 0
COUGH: 1
DIARRHEA: 0
NAUSEA: 1
SORE THROAT: 0
ABDOMINAL PAIN: 0
CHEST TIGHTNESS: 1
VOMITING: 0
SHORTNESS OF BREATH: 0

## 2021-09-29 ASSESSMENT — PAIN SCALES - GENERAL
PAINLEVEL_OUTOF10: 8
PAINLEVEL_OUTOF10: 0
PAINLEVEL_OUTOF10: 0

## 2021-09-29 ASSESSMENT — PAIN DESCRIPTION - LOCATION: LOCATION: GENERALIZED

## 2021-09-29 NOTE — PROGRESS NOTES
Admitted patient to room 4258 from the Emergency Room with COVID-19. VS stable (see flowsheet). Patient's breathing regular and unlabored, denies pain. Oriented to room, call light, TV, phone, patient rights and responsibilities. Bed in lowest position and locked, exit alarm in place. Non-slip socks on. ID bracelet on and correct per pt verbally reporting name and date of birth. Call light within reach. Needed items within reach.

## 2021-09-29 NOTE — ED NOTES
Notified EDPA of the pt O2 on RA; 88%. New orders provided. I placed the pt on 2L of NC; O2 saturation 99%.      Flori Heath RN  09/29/21 3475

## 2021-09-29 NOTE — ED NOTES
Handoff report given to Eduard Cramer RN to assume care. No further questions at this time. I placed transport to room 4256. If any questions arise, please call [phone].      María Elena Clifford RN  09/29/21 2369

## 2021-09-29 NOTE — ED PROVIDER NOTES
I have personally performed a face to face diagnostic evaluation on this patient. I have fully participated in the care of this patient. I have reviewed and agree with all pertinent clinical information including history, physical exam, diagnostic tests, and the plan. HPI: Cara Smith presented with shortness of breath, cough, hemoptysis. Patient tested positive for coronavirus today and states she is feeling worse. Symptoms been going on for last 2 days. Multiple sick contacts. Unvaccinated. No chest pain at this time. Otherwise resting comfortably in bed. Chief Complaint   Patient presents with    Positive For Covid-19     cough, mucus, tossing and turning. Tested positive today      Review of Systems: See MATT note  Vital Signs: /87 Comment: Has not taken BP meds in 3 weeks  Pulse 92   Temp 98 °F (36.7 °C) (Temporal)   Resp 18   LMP 06/01/2014   SpO2 93%     Alert 63 y.o. female who does not appear toxic or acutely ill  HENT: Atraumatic, oral mucosa moist  Neck: Grossly normal ROM  Chest/Lungs: respiratory effort normal, lung sounds clear  Abdomen: Soft nontender  Extremities: 2+ radial bilaterally  Musculoskeletal: Grossly normal ROM  Skin: No palor or diaphoresis    Medical Decision Making and Plan:  Pertinent Labs & Imaging studies reviewed. (See MATT chart for details)  I agree with assessment and plan. Will obtain labs including cardiac work-up and CT a to rule out pulmonary embolism. Patient otherwise afebrile not tachycardic saturating 93 to 95% on room air. Will monitor closely. Update:  Patient now hypoxic. Will give steroids and admit.      EKG Interpretation    Interpreted by emergency department physician    Rhythm: normal sinus   Rate: normal  Axis: normal  Ectopy: none  Conduction: normal  ST Segments: no acute change  T Waves: non specific changes  Q Waves: none    Clinical Impression: non-specific EKG    MD Onofre Porras MD  09/29/21 [address]

## 2021-09-29 NOTE — ED NOTES
Pharmacy Medication Reconciliation Note     List of medications patient is currently taking is complete. Source of information:   1. EMR/disp record  2. patient    Notes regarding home medications:   1. Reports she has not taken any of her regular medications for the past 2 weeks since she has been feeling ill  2. Has been using some OTC cold/flu medications to treat her symptoms.      Denies taking any other OTC or herbal medications    Delmas Boas, PharmD, BCPS  9/29/2021  5:09 PM

## 2021-09-29 NOTE — H&P
Hospital Medicine History & Physical      PCP: Randy Gordillo MD    Date of Admission: 9/29/2021    Date of Service: Pt seen/examined on 9/29/2021    Chief Complaint:      Chief Complaint   Patient presents with    Positive For Covid-19     cough, mucus, tossing and turning. Tested positive today       History Of Present Illness:     68-year-old female with past medical history of hypertension diabetes mellitus, morbid obesity presented to the hospital feeling unwell for the past 2 days. Patient states that she got her first dose of the vaccine on 911. Few days later she had been feeling sick with cough and congestion. She believes that she may have been infected after exposure to her daughter who is also sick. Over the past couple of weeks she said she has been having fever, chills, nausea, congestion, body aches, headaches. She tried to alleviate most of the symptoms at home but with no improvement decided to come to the hospital.  On average she was noted to be hemodynamically stable. Lab work was fairly unremarkable. CT of the chest was performed which showed multifocal airspace disease consistent with Covid pneumonia.   Patient was admitted to the hospital for the work-up and management      Past Medical History:        Diagnosis Date    Allergic rhinitis     Arthritis     Asthma     not currently medically treated    Essential hypertension 2/16/2018    Hyperlipidemia     Neuropathy     Osteoarthritis     Type 2 diabetes mellitus without complication, without long-term current use of insulin ([address] 4/17/2019    Wears glasses        Past Surgical History:        Procedure Laterality Date    CARPAL TUNNEL RELEASE Right 7-10-14    FOOT SURGERY Right     rods placed    KNEE ARTHROSCOPY Right     MENISCECTOMY Right     TOTAL KNEE ARTHROPLASTY Right 2/12/2020    RIGHT TOTAL KNEE REPLACEMENT ROBOTIC ASSISTED performed by Tammy Blum MD at [place] [address]       Medications Prior to Admission: Prior to Admission medications    Medication Sig Start Date End Date Taking? Authorizing Provider   aspirin 81 MG chewable tablet Take 81 mg by mouth daily   Yes Historical Provider, MD   hydroCHLOROthiazide (HYDRODIURIL) 25 MG tablet Take 1 tablet by mouth daily Cancel hydrochlorothiazide 12.5 mg 8/16/21  Yes Danielle Snyder MD   amLODIPine (NORVASC) 10 MG tablet Take 1 tablet by mouth daily 8/16/21  Yes Danielle Snyder MD   losartan (COZAAR) 50 MG tablet Take 1 tablet by mouth daily 8/16/21  Yes Danielle Snyder MD   vitamin D (ERGOCALCIFEROL) 1.25 MG (37937 UT) CAPS capsule Take 1 capsule by mouth once a week 8/16/21  Yes Danielle Snyder MD   atorvastatin (LIPITOR) 40 MG tablet Take 1 tablet by mouth daily 8/16/21  Yes Danielle Snyder MD   metFORMIN (GLUCOPHAGE) 500 MG tablet Take 1 tablet by mouth 2 times daily (with meals) 6/25/21  Yes Danielle Snyder MD   fluticasone [facility]) 50 MCG/ACT nasal spray 1 spray by Nasal route daily 6/25/21  Yes Danielle Snyder MD   acetaminophen (TYLENOL) 500 MG tablet Take 1,000 mg by mouth every 4 hours as needed for Pain   Yes Historical Provider, MD   Blood Pressure KIT Ck blood pressure once a week 6/4/20   Danielle Snyder MD       Allergies:  Patient has no known allergies. Social History:       reports that she quit smoking about 12 years ago. Her smoking use included cigarettes. She has a 13.50 pack-year smoking history. She has never used smokeless tobacco. She reports current alcohol use. She reports that she does not use drugs. Family History:  Reviewed in detail and Positive as follows:        Problem Relation Age of Onset   [employer] Cancer Mother         pancreatic    Cirrhosis Father     High Blood Pressure Father        REVIEW OF SYSTEMS:   Positive review  noted in the HPI. All other systems reviewed and negative.     PHYSICAL EXAM:    /74   Pulse 84   Temp 98 °F (36.7 °C) (Temporal)   Resp 18   LMP 06/01/2014   SpO2 96%   General Appearance: alert and oriented to person, place and time, well developed and well- nourished, in no acute distress  Skin: warm and dry, no rash or erythema  Head: normocephalic and atraumatic  Eyes: pupils equal, round, and reactive to light, extraocular eye movements intact, conjunctivae normal  ENT: tympanic membrane, external ear and ear canal normal bilaterally, nose without deformity, nasal mucosa and turbinates normal without polyps  Neck: supple and non-tender without mass, no thyromegaly or thyroid nodules, no cervical lymphadenopathy  Pulmonary/Chest: clear to auscultation bilaterally- no wheezes, rales or rhonchi, normal air movement, no respiratory distress  Cardiovascular: normal rate, regular rhythm, normal S1 and S2, no murmurs, rubs, clicks, or gallops, Peripheral pulses good, Cap refill <3 sec, no carotid bruits  Abdomen: soft, non-tender, non-distended, normal bowel sounds, no masses or organomegaly  Extremities: no cyanosis, clubbing or edema  Musculoskeletal: normal range of motion, no joint swelling, deformity or tenderness  Neurologic: reflexes normal and symmetric, no cranial nerve deficit, gait, coordination and speech normal      LABS:      CBC   Recent Labs     09/29/21  1202   WBC 6.4   HGB 14.0   HCT 44.7         RENAL  Recent Labs     09/29/21  1202   *   K 3.8   CL 94*   CO2 28   BUN 9   CREATININE 1.0     LFT'S  Recent Labs     09/29/21  1202   AST 25   ALT 21   BILITOT 0.3   ALKPHOS 62     COAG  No results for input(s): INR in the last 72 hours.   CARDIAC ENZYMES  Recent Labs     09/29/21  1202   TROPONINI <0.01       U/A:    Lab Results   Component Value Date    COLORU YELLOW 01/29/2020    WBCUA 3 12/09/2019    RBCUA 3 12/09/2019    BACTERIA 1+ 12/09/2019    CLARITYU Clear 01/29/2020    SPECGRAV 1.026 01/29/2020    LEUKOCYTESUR Negative 01/29/2020    BLOODU Negative 01/29/2020    GLUCOSEU Negative 01/29/2020       ABG  No results found for: OKV9IYZ, BEART, F2JWHVPX, PHART, THGBART, IIQ4JCH, PO2ART, EBY3WEQ    UA:No results for input(s): NITRITE, COLORU, PHUR, LABCAST, WBCUA, RBCUA, MUCUS, TRICHOMONAS, YEAST, BACTERIA, CLARITYU, SPECGRAV, LEUKOCYTESUR, UROBILINOGEN, BILIRUBINUR, BLOODU, GLUCOSEU, KETUA, AMORPHOUS in the last 72 hours. Microbiology:  No results for input(s): LABGRAM, LABANAE, ORG, CXSURG in the last 72 hours. Nasal Culture: No results for input(s): ORG, MRSAPCR in the last 72 hours. Blood Culture: No results for input(s): BC, BLOODCULT2, ORG in the last 72 hours. Fungal Culture:   No results for input(s): FUNGSM in the last 72 hours. No results for input(s): FUNCXBLD in the last 72 hours. CSF Culture:  No results for input(s): COLORCSF, APPEARCSF, CFTUBE, CLOTCSF, WBCCSF, RBCCSF, NEUTCSF, NUMCELLSCSF, LYMPHSCSF, MONOCSF, GLUCCSF, VOLCSF in the last 72 hours. Respiratory Culture:  No results for input(s): Vicente Kalia in the last 72 hours. AFB:No results for input(s): AFBSMEAR in the last 72 hours. Urine Culture  No results for input(s): LABURIN in the last 72 hours. RADIOLOGY:    CT CHEST PULMONARY EMBOLISM W CONTRAST   Final Result   Somewhat limited evaluation for pulmonary embolism due to suboptimal contrast   bolus timing. No evidence of central pulmonary embolism. Multifocal airspace disease, given history of COVID, this is consistent with   COVID pneumonia.              Previous medical records personally reviewed and analyzed         PHYSICIAN CERTIFICATION    I certify that Deonte Barroso is expected to be hospitalized for >2 midnights based on the following assessment and plan:    ASSESSMENT/PLAN:  Active Hospital Problems    Diagnosis Date Noted    COVID-19 [U07.1] 09/29/2021     Covid pneumonia  Acute hypoxic respiratory failure  -patient is mildly hypoxic with 88% on room air, will start Decadron  -Continue symptomatic management for now     Hypertension  -Continue home blood pressure medications    Diabetes mellitus  -Continue insulin sliding scale    Morbid obesity with BMI of 38  -Complicating medical problem and placing patient at high risk of morbidity and mortality    DVT Prophylaxis: Lovenox  Diet: Carb controlled  Code Status: Full    Dispo -pending clinical course       Maria M Beard MD  The note was completed using EMR. Every effort was made to ensure accuracy; however, inadvertent computerized transcription errors may be present. Thank you Uma Camara MD for the opportunity to be involved in this patient's care. If you have any questions or concerns please feel free to contact me at [phone].

## 2021-09-29 NOTE — ED NOTES
Pt was given a [place] ([facility]) sandwich.       Norbert Peguero RN  09/29/21 3085

## 2021-09-29 NOTE — ED PROVIDER NOTES
[facility]        Pt Name: Helen Faust  MRN: [id number]  [place] 1960  Date of evaluation: 9/29/2021  Provider: SAGE Cancino  PCP: Savanna Byrne MD  Note Started: 11:43 AM EDT        I have seen and evaluated this patient with my supervising physician Booker Lefort, MD.    [address] [place] [address]       Chief Complaint   Patient presents with    Positive For Covid-19     cough, mucus, tossing and turning. Tested positive today       HISTORY OF PRESENT ILLNESS   (Location, Timing/Onset, Context/Setting, Quality, Duration, Modifying Factors, Severity, Associated Signs and Symptoms)  Note limiting factors. Chief Complaint: Ria Flores is a 63 y.o. female who presents to the ED today with complaints of COVID. She got her first vaccine on 9/11, and a few days later developed cough and congestion. States that she was around her daughter, who was diagnosed with Covid, and she believes that is how she was exposed. She states she has been feeling unwell for about 2 weeks, but after she tested positive today and was feeling so bad she decided to come in for further evaluation. She has some cough, chest tightness, facial congestion, nausea, body aches, and headaches. She states she is otherwise healthy. She has no further complaints at this time. Nursing Notes were all reviewed and agreed with or any disagreements were addressed in the HPI. REVIEW OF SYSTEMS    (2-9 systems for level 4, 10 or more for level 5)     Review of Systems   Constitutional: Positive for fatigue. Negative for chills and fever. HENT: Positive for congestion. Negative for sore throat and trouble swallowing. Respiratory: Positive for cough and chest tightness. Negative for shortness of breath. Cardiovascular: Negative for chest pain and palpitations. Gastrointestinal: Positive for nausea.  Negative for abdominal pain, diarrhea and vomiting. Musculoskeletal: Positive for myalgias. Negative for arthralgias. Neurological: Positive for headaches. Negative for dizziness, weakness, light-headedness and numbness. Positives and Pertinent negatives as per HPI. Except as noted above in the ROS, all other systems were reviewed and negative.        PAST MEDICAL HISTORY     Past Medical History:   Diagnosis Date    Allergic rhinitis     Arthritis     Asthma     not currently medically treated    Essential hypertension 2/16/2018    Hyperlipidemia     Neuropathy     Osteoarthritis     Type 2 diabetes mellitus without complication, without long-term current use of insulin ([facility] [address] 4/17/2019    Wears glasses          SURGICAL HISTORY     Past Surgical History:   Procedure Laterality Date    CARPAL TUNNEL RELEASE Right 7-10-14    FOOT SURGERY Right     rods placed    KNEE ARTHROSCOPY Right     MENISCECTOMY Right     TOTAL KNEE ARTHROPLASTY Right 2/12/2020    RIGHT TOTAL KNEE REPLACEMENT ROBOTIC ASSISTED performed by Minh Noe MD at [address]       Previous Medications    ACETAMINOPHEN (TYLENOL) 500 MG TABLET    Take 1,000 mg by mouth every 4 hours as needed for Pain    AMLODIPINE (NORVASC) 10 MG TABLET    Take 1 tablet by mouth daily    ASPIRIN 81 MG CHEWABLE TABLET    Take 81 mg by mouth daily    ATORVASTATIN (LIPITOR) 40 MG TABLET    Take 1 tablet by mouth daily    BLOOD PRESSURE KIT    Ck blood pressure once a week    FLUTICASONE (FLONASE) 50 MCG/ACT NASAL SPRAY    1 spray by Nasal route daily    HYDROCHLOROTHIAZIDE (HYDRODIURIL) 25 MG TABLET    Take 1 tablet by mouth daily Cancel hydrochlorothiazide 12.5 mg    LOSARTAN (COZAAR) 50 MG TABLET    Take 1 tablet by mouth daily    METFORMIN (GLUCOPHAGE) 500 MG TABLET    Take 1 tablet by mouth 2 times daily (with meals)    VITAMIN D (ERGOCALCIFEROL) 1.25 MG (64144 UT) CAPS CAPSULE    Take 1 capsule by mouth once a week         ALLERGIES     Patient has no known allergies. FAMILYHISTORY       Family History   Problem Relation Age of Onset    Cancer Mother         pancreatic    Cirrhosis Father     High Blood Pressure Father           SOCIAL HISTORY       Social History     Tobacco Use    Smoking status: Former Smoker     Packs/day: 1.50     Years: 9.00     Pack years: 13.50     Types: Cigarettes     Quit date: 2009     Years since quittin.2    Smokeless tobacco: Never Used    Tobacco comment: quit:  7 years ago   Substance Use Topics    Alcohol use: Yes     Comment: rare    Drug use: Never       SCREENINGS             PHYSICAL EXAM    (up to 7 for level 4, 8 or more for level 5)     ED Triage Vitals [21 1108]   BP Temp Temp Source Pulse Resp SpO2 Height Weight   126/87 98 °F (36.7 °C) Temporal 92 18 93 % -- --       Physical Exam  Vitals and nursing note reviewed. Constitutional:       General: She is not in acute distress. Appearance: Normal appearance. She is well-developed. She is not ill-appearing, toxic-appearing or diaphoretic. HENT:      Head: Normocephalic and atraumatic. Right Ear: Tympanic membrane and ear canal normal.      Left Ear: Tympanic membrane and ear canal normal.      Nose: Nose normal.      Mouth/Throat:      Mouth: Mucous membranes are moist.      Pharynx: Oropharynx is clear. No oropharyngeal exudate or posterior oropharyngeal erythema. Eyes:      Conjunctiva/sclera: Conjunctivae normal.      Pupils: Pupils are equal, round, and reactive to light. Cardiovascular:      Rate and Rhythm: Normal rate and regular rhythm. Pulses: Normal pulses. Pulmonary:      Effort: Pulmonary effort is normal. No respiratory distress. Abdominal:      General: Bowel sounds are normal. There is no distension. Palpations: Abdomen is soft. Tenderness: There is no abdominal tenderness. Musculoskeletal:      Cervical back: Normal range of motion and neck supple. Right lower leg: No edema.       Left lower leg: No edema. Skin:     General: Skin is warm and dry. Neurological:      Mental Status: She is alert and oriented to person, place, and time. Psychiatric:         Behavior: Behavior normal. Behavior is cooperative. Thought Content: Thought content normal.         DIAGNOSTIC RESULTS   LABS:    Labs Reviewed   CBC WITH AUTO DIFFERENTIAL - Abnormal; Notable for the following components:       Result Value    RBC 5.39 (*)     MCH 25.9 (*)     Lymphocytes Absolute 0.7 (*)     All other components within normal limits    Narrative:     Performed at:  [address] [place] [address]   Phone [phone]   COMPREHENSIVE METABOLIC PANEL W/ REFLEX TO MG FOR LOW K - Abnormal; Notable for the following components:    Sodium 134 (*)     Chloride 94 (*)     Glucose 107 (*)     GFR Non- 57 (*)     Total Protein 8.3 (*)     All other components within normal limits    Narrative:     Performed at:  [address] [facility] [place] [address]   Phone [phone]   TROPONIN    Narrative:     Performed at:  [address] [facility] [place] [address]   Phone [phone]   LACTIC ACID, PLASMA    Narrative:     Performed at:  [address] [place] [address]   Phone [phone]       When ordered only abnormal lab results are displayed. All other labs were within normal range or not returned as of this dictation. EKG: When ordered, EKG's are interpreted by the Emergency Department Physician in the absence of a cardiologist.  Please see their note for interpretation of EKG.     RADIOLOGY:   Non-plain film images such as CT, Ultrasound and MRI are read by the radiologist. Plain radiographic images are visualized and preliminarily interpreted by the ED Provider with the below findings:        Interpretation per the Radiologist below, if available at the time of this note:    CT CHEST PULMONARY EMBOLISM W CONTRAST   Final Result   Somewhat limited evaluation for pulmonary embolism due to suboptimal contrast   bolus timing. No evidence of central pulmonary embolism. Multifocal airspace disease, given history of COVID, this is consistent with   COVID pneumonia. No results found. PROCEDURES   Unless otherwise noted below, none     Procedures    CRITICAL CARE TIME   N/A    CONSULTS:  IP CONSULT TO HOSPITALIST      EMERGENCY DEPARTMENT COURSE and DIFFERENTIAL DIAGNOSIS/MDM:   Vitals:    Vitals:    09/29/21 1215 09/29/21 1245 09/29/21 1315 09/29/21 1348   BP:    134/87   Pulse: 88 90 88 90   Resp:       Temp:       TempSrc:       SpO2: 91% (!) 88% 100% 97%       Patient was given the following medications:  Medications   dexamethasone (DECADRON) injection 6 mg (6 mg IntraVENous Given 9/29/21 1248)   iopamidol (ISOVUE-370) 76 % injection 75 mL (75 mLs IntraVENous Given 9/29/21 1302)           ED COURSE & MEDICAL DECISION MAKING    - The patient presented to the ER with complaints of congestion, concern for COVID. Vital signs were reviewed. Exam with WDWN female, no acute distress but with O2 saturation 88% on room air at rest. \Peripheral IV placed. \Labs, Imaging ordered. - Pertinent Labs & Imaging studies reviewed. (See chart for details)   -  Patient seen and evaluated in the emergency department. -  Triage and nursing notes reviewed and incorporated. -  Old chart records reviewed and incorporated.   -   I have seen and evaluated this patient with my supervising physician Cleo Schultz MD.  -  Differential diagnosis includes: acute coronary syndrome, pulmonary embolism, COPD/asthma, pneumonia, sepsis, pericardial tamponade, pneumothorax, CHF, thoracic aortic dissection, anxiety  -  Work-up included:  See above  -  ED treatment included:  supplemental O2, decadron - Consults: Hospitalist for admission  -  Results discussed with patient and/or family. Labs show no leukocytosis or concerning anemia, there are no concerning electrolyte abnormalities, her troponin is less than 0.01, lactic acid is not elevated at 1. Imaging studies show no evidence of central pulmonary embolism, but she does have multifocal airspace disease that is consistent with Covid pneumonia. Patient feels improved with the oxygen, Decadron. At this time, we recommend admission, as the patient has a new oxygen requirement. The patient and/or family is agreeable with plan of care and disposition.  -  Disposition:  Admission  - Critical Care: Because of high probability of sudden clinical deterioration of the patient's condition or  further deterioration, critical care time included my full attention to the patient's condition, including chart data review, documentation, medication ordering, reviewing the patient's old records, reevaluation patient's cardiac, pulmonary and neurological status. Reassessment of vital signs. Consultations with ED attending and admitting physician. Ordering, interpreting reviewing diagnostic testing. Therefore, my critical care time was 16 minutes of direct attention to the patient's condition did not include time spent on separately billable procedures. FINAL IMPRESSION      1. Hypoxia    2. Pneumonia due to COVID-19 virus          DISPOSITION/PLAN   DISPOSITION        PATIENT REFERRED TO:  No follow-up provider specified. DISCHARGE MEDICATIONS:  New Prescriptions    No medications on file       DISCONTINUED MEDICATIONS:  Discontinued Medications    ASPIRIN EC 81 MG EC TABLET    Take 1 tablet by mouth 2 times daily for 14 days Take for DVT blood clot rpophylaxis. Please avoid missing doses.               (Please note that portions of this note were completed with a voice recognition program.  Efforts were made to edit the dictations but occasionally words are mis-transcribed.)    SAGE Webb (electronically signed)            Akhil Webbma  09/29/21 0015

## 2021-09-30 LAB
ALBUMIN SERPL-MCNC: 3.9 G/DL (ref 3.4–5)
ALP BLD-CCNC: 59 U/L (ref 40–129)
ALT SERPL-CCNC: 18 U/L (ref 10–40)
ANION GAP SERPL CALCULATED.3IONS-SCNC: 15 MMOL/L (ref 3–16)
AST SERPL-CCNC: 21 U/L (ref 15–37)
BASOPHILS ABSOLUTE: 0 K/UL (ref 0–0.2)
BASOPHILS RELATIVE PERCENT: 0.1 %
BILIRUB SERPL-MCNC: <0.2 MG/DL (ref 0–1)
BILIRUBIN DIRECT: <0.2 MG/DL (ref 0–0.3)
BILIRUBIN, INDIRECT: NORMAL MG/DL (ref 0–1)
BUN BLDV-MCNC: 20 MG/DL (ref 7–20)
C-REACTIVE PROTEIN: 100.8 MG/L (ref 0–5.1)
CALCIUM SERPL-MCNC: 9 MG/DL (ref 8.3–10.6)
CHLORIDE BLD-SCNC: 96 MMOL/L (ref 99–110)
CO2: 26 MMOL/L (ref 21–32)
CREAT SERPL-MCNC: 1 MG/DL (ref 0.6–1.1)
D DIMER: 360 NG/ML DDU (ref 0–229)
EOSINOPHILS ABSOLUTE: 0 K/UL (ref 0–0.6)
EOSINOPHILS RELATIVE PERCENT: 0 %
FERRITIN: 622.7 NG/ML (ref 15–150)
GFR AFRICAN AMERICAN: >60
GFR NON-AFRICAN AMERICAN: 57
GLUCOSE BLD-MCNC: 133 MG/DL (ref 70–99)
GLUCOSE BLD-MCNC: 142 MG/DL (ref 70–99)
GLUCOSE BLD-MCNC: 153 MG/DL (ref 70–99)
GLUCOSE BLD-MCNC: 185 MG/DL (ref 70–99)
GLUCOSE BLD-MCNC: 259 MG/DL (ref 70–99)
HCT VFR BLD CALC: 42.1 % (ref 36–48)
HEMOGLOBIN: 13.7 G/DL (ref 12–16)
LYMPHOCYTES ABSOLUTE: 0.6 K/UL (ref 1–5.1)
LYMPHOCYTES RELATIVE PERCENT: 6.1 %
MCH RBC QN AUTO: 26.6 PG (ref 26–34)
MCHC RBC AUTO-ENTMCNC: 32.5 G/DL (ref 31–36)
MCV RBC AUTO: 81.9 FL (ref 80–100)
MONOCYTES ABSOLUTE: 0.5 K/UL (ref 0–1.3)
MONOCYTES RELATIVE PERCENT: 5.4 %
NEUTROPHILS ABSOLUTE: 8.3 K/UL (ref 1.7–7.7)
NEUTROPHILS RELATIVE PERCENT: 88.4 %
PDW BLD-RTO: 15.1 % (ref 12.4–15.4)
PERFORMED ON: ABNORMAL
PLATELET # BLD: 188 K/UL (ref 135–450)
PMV BLD AUTO: 9.7 FL (ref 5–10.5)
POTASSIUM REFLEX MAGNESIUM: 3.7 MMOL/L (ref 3.5–5.1)
RBC # BLD: 5.14 M/UL (ref 4–5.2)
SARS-COV-2: DETECTED
SEDIMENTATION RATE, ERYTHROCYTE: 58 MM/HR (ref 0–30)
SODIUM BLD-SCNC: 137 MMOL/L (ref 136–145)
TOTAL PROTEIN: 7.9 G/DL (ref 6.4–8.2)
WBC # BLD: 9.3 K/UL (ref 4–11)

## 2021-09-30 PROCEDURE — 36415 COLL VENOUS BLD VENIPUNCTURE: CPT

## 2021-09-30 PROCEDURE — U0003 INFECTIOUS AGENT DETECTION BY NUCLEIC ACID (DNA OR RNA); SEVERE ACUTE RESPIRATORY SYNDROME CORONAVIRUS 2 (SARS-COV-2) (CORONAVIRUS DISEASE [COVID-19]), AMPLIFIED PROBE TECHNIQUE, MAKING USE OF HIGH THROUGHPUT TECHNOLOGIES AS DESCRIBED BY CMS-2020-01-R: HCPCS

## 2021-09-30 PROCEDURE — 85379 FIBRIN DEGRADATION QUANT: CPT

## 2021-09-30 PROCEDURE — 85652 RBC SED RATE AUTOMATED: CPT

## 2021-09-30 PROCEDURE — 6370000000 HC RX 637 (ALT 250 FOR IP): Performed by: INTERNAL MEDICINE

## 2021-09-30 PROCEDURE — 2580000003 HC RX 258: Performed by: INTERNAL MEDICINE

## 2021-09-30 PROCEDURE — 2500000003 HC RX 250 WO HCPCS: Performed by: INTERNAL MEDICINE

## 2021-09-30 PROCEDURE — 80048 BASIC METABOLIC PNL TOTAL CA: CPT

## 2021-09-30 PROCEDURE — 1200000000 HC SEMI PRIVATE

## 2021-09-30 PROCEDURE — 86140 C-REACTIVE PROTEIN: CPT

## 2021-09-30 PROCEDURE — 85025 COMPLETE CBC W/AUTO DIFF WBC: CPT

## 2021-09-30 PROCEDURE — 80076 HEPATIC FUNCTION PANEL: CPT

## 2021-09-30 PROCEDURE — 6360000002 HC RX W HCPCS: Performed by: INTERNAL MEDICINE

## 2021-09-30 PROCEDURE — U0005 INFEC AGEN DETEC AMPLI PROBE: HCPCS

## 2021-09-30 PROCEDURE — 82728 ASSAY OF FERRITIN: CPT

## 2021-09-30 RX ORDER — NICOTINE POLACRILEX 4 MG
15 LOZENGE BUCCAL PRN
Status: DISCONTINUED | OUTPATIENT
Start: 2021-09-30 | End: 2021-10-06 | Stop reason: HOSPADM

## 2021-09-30 RX ORDER — GUAIFENESIN 600 MG/1
600 TABLET, EXTENDED RELEASE ORAL 2 TIMES DAILY
Status: DISCONTINUED | OUTPATIENT
Start: 2021-09-30 | End: 2021-10-06 | Stop reason: HOSPADM

## 2021-09-30 RX ORDER — DEXTROSE MONOHYDRATE 50 MG/ML
100 INJECTION, SOLUTION INTRAVENOUS PRN
Status: DISCONTINUED | OUTPATIENT
Start: 2021-09-30 | End: 2021-10-06 | Stop reason: HOSPADM

## 2021-09-30 RX ORDER — DEXTROSE MONOHYDRATE 25 G/50ML
12.5 INJECTION, SOLUTION INTRAVENOUS PRN
Status: DISCONTINUED | OUTPATIENT
Start: 2021-09-30 | End: 2021-10-06 | Stop reason: HOSPADM

## 2021-09-30 RX ADMIN — ATORVASTATIN CALCIUM 40 MG: 40 TABLET, FILM COATED ORAL at 09:57

## 2021-09-30 RX ADMIN — GUAIFENESIN 600 MG: 600 TABLET, EXTENDED RELEASE ORAL at 12:17

## 2021-09-30 RX ADMIN — SODIUM CHLORIDE 25 ML: 9 INJECTION, SOLUTION INTRAVENOUS at 15:27

## 2021-09-30 RX ADMIN — REMDESIVIR 200 MG: 100 INJECTION, POWDER, LYOPHILIZED, FOR SOLUTION INTRAVENOUS at 15:27

## 2021-09-30 RX ADMIN — HYDROCHLOROTHIAZIDE 25 MG: 25 TABLET ORAL at 09:56

## 2021-09-30 RX ADMIN — ENOXAPARIN SODIUM 40 MG: 40 INJECTION SUBCUTANEOUS at 09:57

## 2021-09-30 RX ADMIN — FLUTICASONE PROPIONATE 1 SPRAY: 50 SPRAY, METERED NASAL at 09:55

## 2021-09-30 RX ADMIN — INSULIN LISPRO 2 UNITS: 100 INJECTION, SOLUTION INTRAVENOUS; SUBCUTANEOUS at 17:40

## 2021-09-30 RX ADMIN — ENOXAPARIN SODIUM 30 MG: 30 INJECTION SUBCUTANEOUS at 20:59

## 2021-09-30 RX ADMIN — ACETAMINOPHEN 650 MG: 325 TABLET ORAL at 05:55

## 2021-09-30 RX ADMIN — ASPIRIN 81 MG CHEWABLE TABLET 81 MG: 81 TABLET CHEWABLE at 09:57

## 2021-09-30 RX ADMIN — DEXAMETHASONE 6 MG: 6 TABLET ORAL at 09:56

## 2021-09-30 RX ADMIN — AMLODIPINE BESYLATE 10 MG: 10 TABLET ORAL at 09:56

## 2021-09-30 RX ADMIN — GUAIFENESIN 600 MG: 600 TABLET, EXTENDED RELEASE ORAL at 20:59

## 2021-09-30 RX ADMIN — LOSARTAN POTASSIUM 50 MG: 50 TABLET, FILM COATED ORAL at 09:56

## 2021-09-30 RX ADMIN — ACETAMINOPHEN 650 MG: 325 TABLET ORAL at 20:59

## 2021-09-30 RX ADMIN — ACETAMINOPHEN 650 MG: 325 TABLET ORAL at 11:45

## 2021-09-30 RX ADMIN — INSULIN LISPRO 3 UNITS: 100 INJECTION, SOLUTION INTRAVENOUS; SUBCUTANEOUS at 20:59

## 2021-09-30 ASSESSMENT — PAIN SCALES - GENERAL
PAINLEVEL_OUTOF10: 0
PAINLEVEL_OUTOF10: 1
PAINLEVEL_OUTOF10: 0

## 2021-09-30 NOTE — PROGRESS NOTES
Pt awake and alert resting in bed. Pt denies pain, c/o being sweaty. Temp 99.3. gown and pillow cases changed. Educated pt on hydration and prone sleeping. Call light and needs in reach.    Electronically signed by Moy Hand RN on 9/29/2021 at 10:03 PM

## 2021-09-30 NOTE — ACP (ADVANCE CARE PLANNING)
Advance Care Planning     Advance Care Planning Activator (Inpatient)  Conversation Note      Date of ACP Conversation: 9/30/2021     Conversation Conducted with: Patient with Decision Making Capacity    ACP Activator: PARVEZ Dick[place], [place]    Health Care Decision Maker:     Current Designated Health Care Decision Maker:     Primary Decision Maker: Gabriela Shaffer Child - [phone]    Primary Decision Maker: Mabel Arciniega - Child - [phone]    Primary Decision Maker: Lonzell Kayser Child - [phone]    Care Preferences    Ventilation: \"If you were in your present state of health and suddenly became very ill and were unable to breathe on your own, what would your preference be about the use of a ventilator (breathing machine) if it were available to you? \"      Would the patient desire the use of ventilator (breathing machine)?: yes    \"If your health worsens and it becomes clear that your chance of recovery is unlikely, what would your preference be about the use of a ventilator (breathing machine) if it were available to you? \"     Would the patient desire the use of ventilator (breathing machine)?: No      Resuscitation  \"CPR works best to restart the heart when there is a sudden event, like a heart attack, in someone who is otherwise healthy. Unfortunately, CPR does not typically restart the heart for people who have serious health conditions or who are very sick. \"    \"In the event your heart stopped as a result of an underlying serious health condition, would you want attempts to be made to restart your heart (answer \"yes\" for attempt to resuscitate) or would you prefer a natural death (answer \"no\" for do not attempt to resuscitate)? \" yes       [] Yes   [x] No   Educated Patient / Anjana Linares regarding differences between Advance Directives and portable DNR orders.     Length of ACP Conversation in minutes:   5    Conversation Outcomes:  [x] ACP discussion completed  [] Existing advance directive reviewed with patient; no changes to patient's previously recorded wishes  [] New Advance Directive completed  [] Portable Do Not Rescitate prepared for Provider review and signature  [] POLST/POST/MOLST/MOST prepared for Provider review and signature      Follow-up plan:    [] Schedule follow-up conversation to continue planning  [] Referred individual to Provider for additional questions/concerns   [] Advised patient/agent/surrogate to review completed ACP document and update if needed with changes in condition, patient preferences or care setting    [x] This note routed to one or more involved healthcare providers    Electronically signed by PARVEZ Wheat, DILIA on 9/30/2021 at 2:54 PM

## 2021-09-30 NOTE — PROGRESS NOTES
acetaminophen      Intake/Output Summary (Last 24 hours) at 9/30/2021 1028  Last data filed at 9/30/2021 0548  Gross per 24 hour   Intake 240 ml   Output --   Net 240 ml       Exam:    /77   Pulse 110   Temp 100.6 °F (38.1 °C) (Oral)   Resp 18   Wt 194 lb 10.7 oz (88.3 kg)   LMP 06/01/2014   SpO2 95%   BMI 36.78 kg/m²     General appearance: No apparent distress, appears stated age and cooperative. HEENT: Pupils equal, round, and reactive to light. Conjunctivae/corneas clear. Neck: Supple, with full range of motion. No jugular venous distention. Trachea midline. Respiratory:  Normal respiratory effort. Clear to auscultation, bilaterally without Rales/Wheezes/Rhonchi. Cardiovascular: Regular rate and rhythm with normal S1/S2 without murmurs, rubs or gallops. Abdomen: Soft, non-tender, non-distended with normal bowel sounds. Musculoskeletal: No clubbing, cyanosis or edema bilaterally. Full range of motion without deformity. Skin: Skin color, texture, turgor normal.  No rashes or lesions. Neurologic:  Neurovascularly intact without any focal sensory/motor deficits. Cranial nerves: II-XII intact, grossly non-focal.  Psychiatric: Alert and oriented, thought content appropriate, normal insight  Capillary Refill: Brisk,< 3 seconds   Peripheral Pulses: +2 palpable, equal bilaterally       Labs:   Recent Labs     09/29/21  1202 09/30/21  0741   WBC 6.4 9.3   HGB 14.0 13.7   HCT 44.7 42.1    188     Recent Labs     09/29/21  1202 09/30/21  0741   * 137   K 3.8 3.7   CL 94* 96*   CO2 28 26   BUN 9 20   CREATININE 1.0 1.0   CALCIUM 9.2 9.0     Recent Labs     09/29/21  1202   AST 25   ALT 21   BILITOT 0.3   ALKPHOS 62     No results for input(s): INR in the last 72 hours.   Recent Labs     09/29/21  1202   TROPONINI <0.01       Urinalysis:      Lab Results   Component Value Date    NITRU Negative 01/29/2020    WBCUA 3 12/09/2019    BACTERIA 1+ 12/09/2019    RBCUA 3 12/09/2019    BLOODU Negative 01/29/2020    SPECGRAV 1.026 01/29/2020    GLUCOSEU Negative 01/29/2020       Radiology:  CT CHEST PULMONARY EMBOLISM W CONTRAST   Final Result   Somewhat limited evaluation for pulmonary embolism due to suboptimal contrast   bolus timing. No evidence of central pulmonary embolism. Multifocal airspace disease, given history of COVID, this is consistent with   COVID pneumonia. Assessment/Plan:    Active Hospital Problems    Diagnosis Date Noted    COVID-19 [U07.1] 09/29/2021       Covid-19 pneumonia  Acute hypoxic respiratory failure  -patient is mildly hypoxic with 88% on room air, will start Decadron  -IV Remdesivir per family request reasonable given hypoxia  -Continue symptomatic management for now    Remdesivir Initiation Note    This patient meets criteria for initiation of remdesivir based on the following:  · Proven COVID-19  · Moderate disease (Requiring supplemental O2)  · Acceptable hepatic function (ALT within 5 times ULN)    Exclusion Criteria:   Severe disease requiring invasive or non-invasive mechanical ventilation (includes HFNC & BiPAP)   Could consider use in patients requiring high flow if early on in the disease course (based on symptom duration)   Use of more than 1 vasopressor prior to remdesivir initiation   Already improving on supportive treatment and/or impending discharge   Patients in whom the clinical team think death is in the immediate short-term where remdesivir is unlikely to change the clinical outcome     Liver function tests will be monitored daily while on remdesivir.       Hypertension  -Continue home blood pressure medications     Diabetes mellitus  -Continue insulin sliding scale     Morbid obesity with BMI of 38  -Complicating medical problem and placing patient at high risk of morbidity and mortality    DVT Prophylaxis: Lovenox  Diet: ADULT DIET;  Regular; 3 carb choices (45 gm/meal)  Code Status: Full Code    PT/OT Eval Status: defer    Dispo - continue care    Ty MD Arturo

## 2021-10-01 LAB
A/G RATIO: 1 (ref 1.1–2.2)
ALBUMIN SERPL-MCNC: 3.8 G/DL (ref 3.4–5)
ALP BLD-CCNC: 55 U/L (ref 40–129)
ALT SERPL-CCNC: 17 U/L (ref 10–40)
ANION GAP SERPL CALCULATED.3IONS-SCNC: 18 MMOL/L (ref 3–16)
AST SERPL-CCNC: 21 U/L (ref 15–37)
BASOPHILS ABSOLUTE: 0 K/UL (ref 0–0.2)
BASOPHILS RELATIVE PERCENT: 0.1 %
BILIRUB SERPL-MCNC: 0.3 MG/DL (ref 0–1)
BUN BLDV-MCNC: 27 MG/DL (ref 7–20)
C-REACTIVE PROTEIN: 171.9 MG/L (ref 0–5.1)
CALCIUM SERPL-MCNC: 9.1 MG/DL (ref 8.3–10.6)
CHLORIDE BLD-SCNC: 94 MMOL/L (ref 99–110)
CO2: 24 MMOL/L (ref 21–32)
CREAT SERPL-MCNC: 1 MG/DL (ref 0.6–1.1)
D DIMER: 359 NG/ML DDU (ref 0–229)
EOSINOPHILS ABSOLUTE: 0 K/UL (ref 0–0.6)
EOSINOPHILS RELATIVE PERCENT: 0 %
FERRITIN: 750.9 NG/ML (ref 15–150)
GFR AFRICAN AMERICAN: >60
GFR NON-AFRICAN AMERICAN: 57
GLOBULIN: 4 G/DL
GLUCOSE BLD-MCNC: 131 MG/DL (ref 70–99)
GLUCOSE BLD-MCNC: 134 MG/DL (ref 70–99)
GLUCOSE BLD-MCNC: 135 MG/DL (ref 70–99)
GLUCOSE BLD-MCNC: 153 MG/DL (ref 70–99)
GLUCOSE BLD-MCNC: 227 MG/DL (ref 70–99)
HCT VFR BLD CALC: 42.7 % (ref 36–48)
HEMOGLOBIN: 13.4 G/DL (ref 12–16)
LYMPHOCYTES ABSOLUTE: 0.6 K/UL (ref 1–5.1)
LYMPHOCYTES RELATIVE PERCENT: 4.8 %
MCH RBC QN AUTO: 25.7 PG (ref 26–34)
MCHC RBC AUTO-ENTMCNC: 31.4 G/DL (ref 31–36)
MCV RBC AUTO: 81.8 FL (ref 80–100)
MONOCYTES ABSOLUTE: 0.5 K/UL (ref 0–1.3)
MONOCYTES RELATIVE PERCENT: 4.5 %
NEUTROPHILS ABSOLUTE: 10.5 K/UL (ref 1.7–7.7)
NEUTROPHILS RELATIVE PERCENT: 90.6 %
PDW BLD-RTO: 15.3 % (ref 12.4–15.4)
PERFORMED ON: ABNORMAL
PLATELET # BLD: 213 K/UL (ref 135–450)
PMV BLD AUTO: 9.3 FL (ref 5–10.5)
POTASSIUM REFLEX MAGNESIUM: 3.8 MMOL/L (ref 3.5–5.1)
PROCALCITONIN: 0.08 NG/ML (ref 0–0.15)
RBC # BLD: 5.23 M/UL (ref 4–5.2)
SEDIMENTATION RATE, ERYTHROCYTE: 79 MM/HR (ref 0–30)
SODIUM BLD-SCNC: 136 MMOL/L (ref 136–145)
TOTAL PROTEIN: 7.8 G/DL (ref 6.4–8.2)
WBC # BLD: 11.6 K/UL (ref 4–11)

## 2021-10-01 PROCEDURE — 6370000000 HC RX 637 (ALT 250 FOR IP): Performed by: INTERNAL MEDICINE

## 2021-10-01 PROCEDURE — 85025 COMPLETE CBC W/AUTO DIFF WBC: CPT

## 2021-10-01 PROCEDURE — 82728 ASSAY OF FERRITIN: CPT

## 2021-10-01 PROCEDURE — 80053 COMPREHEN METABOLIC PANEL: CPT

## 2021-10-01 PROCEDURE — 6360000002 HC RX W HCPCS: Performed by: INTERNAL MEDICINE

## 2021-10-01 PROCEDURE — 2700000000 HC OXYGEN THERAPY PER DAY

## 2021-10-01 PROCEDURE — 85652 RBC SED RATE AUTOMATED: CPT

## 2021-10-01 PROCEDURE — 85379 FIBRIN DEGRADATION QUANT: CPT

## 2021-10-01 PROCEDURE — 84145 PROCALCITONIN (PCT): CPT

## 2021-10-01 PROCEDURE — 1200000000 HC SEMI PRIVATE

## 2021-10-01 PROCEDURE — 2580000003 HC RX 258: Performed by: INTERNAL MEDICINE

## 2021-10-01 PROCEDURE — 86140 C-REACTIVE PROTEIN: CPT

## 2021-10-01 PROCEDURE — 94761 N-INVAS EAR/PLS OXIMETRY MLT: CPT

## 2021-10-01 PROCEDURE — 36415 COLL VENOUS BLD VENIPUNCTURE: CPT

## 2021-10-01 PROCEDURE — 2500000003 HC RX 250 WO HCPCS: Performed by: INTERNAL MEDICINE

## 2021-10-01 RX ADMIN — ACETAMINOPHEN 650 MG: 325 TABLET ORAL at 08:37

## 2021-10-01 RX ADMIN — ATORVASTATIN CALCIUM 40 MG: 40 TABLET, FILM COATED ORAL at 08:37

## 2021-10-01 RX ADMIN — FLUTICASONE PROPIONATE 1 SPRAY: 50 SPRAY, METERED NASAL at 08:39

## 2021-10-01 RX ADMIN — DEXAMETHASONE 6 MG: 6 TABLET ORAL at 08:37

## 2021-10-01 RX ADMIN — ASPIRIN 81 MG CHEWABLE TABLET 81 MG: 81 TABLET CHEWABLE at 08:37

## 2021-10-01 RX ADMIN — GUAIFENESIN 600 MG: 600 TABLET, EXTENDED RELEASE ORAL at 08:37

## 2021-10-01 RX ADMIN — ENOXAPARIN SODIUM 30 MG: 30 INJECTION SUBCUTANEOUS at 20:27

## 2021-10-01 RX ADMIN — SODIUM CHLORIDE, PRESERVATIVE FREE 10 ML: 5 INJECTION INTRAVENOUS at 09:41

## 2021-10-01 RX ADMIN — ACETAMINOPHEN 650 MG: 325 TABLET ORAL at 02:06

## 2021-10-01 RX ADMIN — AMLODIPINE BESYLATE 10 MG: 10 TABLET ORAL at 08:37

## 2021-10-01 RX ADMIN — REMDESIVIR 100 MG: 100 INJECTION, POWDER, LYOPHILIZED, FOR SOLUTION INTRAVENOUS at 15:48

## 2021-10-01 RX ADMIN — SODIUM CHLORIDE, PRESERVATIVE FREE 10 ML: 5 INJECTION INTRAVENOUS at 20:28

## 2021-10-01 RX ADMIN — ENOXAPARIN SODIUM 30 MG: 30 INJECTION SUBCUTANEOUS at 08:38

## 2021-10-01 RX ADMIN — GUAIFENESIN 600 MG: 600 TABLET, EXTENDED RELEASE ORAL at 20:28

## 2021-10-01 ASSESSMENT — PAIN SCALES - GENERAL
PAINLEVEL_OUTOF10: 3
PAINLEVEL_OUTOF10: 2
PAINLEVEL_OUTOF10: 3
PAINLEVEL_OUTOF10: 2
PAINLEVEL_OUTOF10: 0
PAINLEVEL_OUTOF10: 0

## 2021-10-01 NOTE — PROGRESS NOTES
Hospitalist Progress Note      PCP: Zoe Allred MD    Date of Admission: 9/29/2021    Chief Complaint: shortness of breath    Hospital Course: 56-year-old female with past medical history of hypertension diabetes mellitus, morbid obesity presented to the hospital feeling unwell for the past 2 days. Patient states that she got her first dose of the vaccine on 911. Few days later she had been feeling sick with cough and congestion. She believes that she may have been infected after exposure to her daughter who is also sick. Over the past couple of weeks she said she has been having fever, chills, nausea, congestion, body aches, headaches. She tried to alleviate most of the symptoms at home but with no improvement decided to come to the hospital.  On average she was noted to be hemodynamically stable. Lab work was fairly unremarkable. CT of the chest was performed which showed multifocal airspace disease consistent with Covid pneumonia. Patient was admitted to the hospital for the work-up and management. Subjective: Pt seen and examined. States she feels slightly better. Down to 1L of oxygen this morning.       Medications:  Reviewed    Infusion Medications    dextrose      sodium chloride Stopped (09/30/21 1624)     Scheduled Medications    insulin lispro  0-12 Units SubCUTAneous TID WC    insulin lispro  0-6 Units SubCUTAneous Nightly    enoxaparin  30 mg SubCUTAneous BID    guaiFENesin  600 mg Oral BID    remdesivir IVPB  100 mg IntraVENous Q24H    fluticasone  1 spray Nasal Daily    hydroCHLOROthiazide  25 mg Oral Daily    amLODIPine  10 mg Oral Daily    losartan  50 mg Oral Daily    [START ON 10/2/2021] vitamin D  50,000 Units Oral Weekly    atorvastatin  40 mg Oral Daily    aspirin  81 mg Oral Daily    sodium chloride flush  5-40 mL IntraVENous 2 times per day    dexamethasone  6 mg Oral Daily     PRN Meds: glucose, dextrose, glucagon (rDNA), dextrose, sodium chloride flush, sodium chloride, ondansetron **OR** ondansetron, polyethylene glycol, acetaminophen **OR** acetaminophen      Intake/Output Summary (Last 24 hours) at 10/1/2021 1058  Last data filed at 10/1/2021 3377  Gross per 24 hour   Intake 1117.17 ml   Output --   Net 1117.17 ml       Exam:    /73   Pulse 70   Temp 98.6 °F (37 °C) (Oral)   Resp 18   Wt 203 lb 4.2 oz (92.2 kg)   LMP 06/01/2014   SpO2 94%   BMI 38.41 kg/m²     General appearance: No apparent distress, appears stated age and cooperative. HEENT: Pupils equal, round, and reactive to light. Conjunctivae/corneas clear. Neck: Supple, with full range of motion. No jugular venous distention. Trachea midline. Respiratory:  Normal respiratory effort. Clear to auscultation, bilaterally without Rales/Wheezes/Rhonchi. Cardiovascular: Regular rate and rhythm with normal S1/S2 without murmurs, rubs or gallops. Abdomen: Soft, non-tender, non-distended with normal bowel sounds. Musculoskeletal: No clubbing, cyanosis or edema bilaterally. Full range of motion without deformity. Skin: Skin color, texture, turgor normal.  No rashes or lesions. Neurologic:  Neurovascularly intact without any focal sensory/motor deficits.  Cranial nerves: II-XII intact, grossly non-focal.  Psychiatric: Alert and oriented, thought content appropriate, normal insight  Capillary Refill: Brisk,< 3 seconds   Peripheral Pulses: +2 palpable, equal bilaterally       Labs:   Recent Labs     09/29/21  1202 09/30/21  0741 10/01/21  0709   WBC 6.4 9.3 11.6*   HGB 14.0 13.7 13.4   HCT 44.7 42.1 42.7    188 213     Recent Labs     09/29/21  1202 09/30/21  0741 10/01/21  0709   * 137 136   K 3.8 3.7 3.8   CL 94* 96* 94*   CO2 28 26 24   BUN 9 20 27*   CREATININE 1.0 1.0 1.0   CALCIUM 9.2 9.0 9.1     Recent Labs     09/29/21  1202 09/30/21  0923 10/01/21  0709   AST 25 21 21   ALT 21 18 17   BILIDIR  --  <0.2  --    BILITOT 0.3 <0.2 0.3   ALKPHOS 62 59 55     No results for input(s): INR in the last 72 hours. Recent Labs     09/29/21  1202   TROPONINI <0.01       Urinalysis:      Lab Results   Component Value Date    NITRU Negative 01/29/2020    WBCUA 3 12/09/2019    BACTERIA 1+ 12/09/2019    RBCUA 3 12/09/2019    BLOODU Negative 01/29/2020    SPECGRAV 1.026 01/29/2020    GLUCOSEU Negative 01/29/2020       Radiology:  CT CHEST PULMONARY EMBOLISM W CONTRAST   Final Result   Somewhat limited evaluation for pulmonary embolism due to suboptimal contrast   bolus timing. No evidence of central pulmonary embolism. Multifocal airspace disease, given history of COVID, this is consistent with   COVID pneumonia.                  Assessment/Plan:    Active Hospital Problems    Diagnosis Date Noted    COVID-19 [U07.1] 09/29/2021       Covid-19 pneumonia  Acute hypoxic respiratory failure  -patient is mildly hypoxic with 88% on room air, will start Decadron  -IV Remdesivir per family request reasonable given hypoxia; complete 5 days of therapy  -Continue symptomatic management for now    Remdesivir Initiation Note    This patient meets criteria for initiation of remdesivir based on the following:  · Proven COVID-19  · Moderate disease (Requiring supplemental O2)  · Acceptable hepatic function (ALT within 5 times ULN)    Exclusion Criteria:   Severe disease requiring invasive or non-invasive mechanical ventilation (includes HFNC & BiPAP)   Could consider use in patients requiring high flow if early on in the disease course (based on symptom duration)   Use of more than 1 vasopressor prior to remdesivir initiation   Already improving on supportive treatment and/or impending discharge   Patients in whom the clinical team think death is in the immediate short-term where remdesivir is unlikely to change the clinical outcome     Liver function tests will be monitored daily while on remdesivir.       Hypertension  -Continue home blood pressure medications     Diabetes mellitus  -Continue insulin sliding scale  --hypoglycemia protocol  --POCT glucose checks  --carb control diet     Morbid obesity with BMI of 38  -Complicating medical problem and placing patient at high risk of morbidity and mortality    DVT Prophylaxis: Lovenox  Diet: ADULT DIET;  Regular; 3 carb choices (45 gm/meal)  Code Status: Full Code    PT/OT Eval Status: defer    Dispo - continue care    Diane Hernández MD

## 2021-10-01 NOTE — PROGRESS NOTES
Pt awake and alert. Stated feels better,still with low grade temp. O2 on 1l,cough productive. Call light in reach. Monitor showing NSR.

## 2021-10-02 LAB
A/G RATIO: 0.8 (ref 1.1–2.2)
ALBUMIN SERPL-MCNC: 3.5 G/DL (ref 3.4–5)
ALP BLD-CCNC: 57 U/L (ref 40–129)
ALT SERPL-CCNC: 16 U/L (ref 10–40)
ANION GAP SERPL CALCULATED.3IONS-SCNC: 16 MMOL/L (ref 3–16)
AST SERPL-CCNC: 17 U/L (ref 15–37)
BASOPHILS ABSOLUTE: 0 K/UL (ref 0–0.2)
BASOPHILS RELATIVE PERCENT: 0.2 %
BILIRUB SERPL-MCNC: 0.3 MG/DL (ref 0–1)
BUN BLDV-MCNC: 23 MG/DL (ref 7–20)
C-REACTIVE PROTEIN: 84.9 MG/L (ref 0–5.1)
CALCIUM SERPL-MCNC: 9.3 MG/DL (ref 8.3–10.6)
CHLORIDE BLD-SCNC: 96 MMOL/L (ref 99–110)
CO2: 21 MMOL/L (ref 21–32)
CREAT SERPL-MCNC: 0.9 MG/DL (ref 0.6–1.1)
D DIMER: 362 NG/ML DDU (ref 0–229)
EOSINOPHILS ABSOLUTE: 0 K/UL (ref 0–0.6)
EOSINOPHILS RELATIVE PERCENT: 0 %
FERRITIN: 1265 NG/ML (ref 15–150)
GFR AFRICAN AMERICAN: >60
GFR NON-AFRICAN AMERICAN: >60
GLOBULIN: 4.2 G/DL
GLUCOSE BLD-MCNC: 161 MG/DL (ref 70–99)
GLUCOSE BLD-MCNC: 190 MG/DL (ref 70–99)
GLUCOSE BLD-MCNC: 218 MG/DL (ref 70–99)
GLUCOSE BLD-MCNC: 220 MG/DL (ref 70–99)
GLUCOSE BLD-MCNC: 306 MG/DL (ref 70–99)
HCT VFR BLD CALC: 44.2 % (ref 36–48)
HEMOGLOBIN: 13.9 G/DL (ref 12–16)
LYMPHOCYTES ABSOLUTE: 0.7 K/UL (ref 1–5.1)
LYMPHOCYTES RELATIVE PERCENT: 6.2 %
MCH RBC QN AUTO: 25.8 PG (ref 26–34)
MCHC RBC AUTO-ENTMCNC: 31.5 G/DL (ref 31–36)
MCV RBC AUTO: 81.7 FL (ref 80–100)
MONOCYTES ABSOLUTE: 0.9 K/UL (ref 0–1.3)
MONOCYTES RELATIVE PERCENT: 8.2 %
NEUTROPHILS ABSOLUTE: 9.6 K/UL (ref 1.7–7.7)
NEUTROPHILS RELATIVE PERCENT: 85.4 %
PDW BLD-RTO: 15.5 % (ref 12.4–15.4)
PERFORMED ON: ABNORMAL
PLATELET # BLD: 239 K/UL (ref 135–450)
PMV BLD AUTO: 8.9 FL (ref 5–10.5)
POTASSIUM REFLEX MAGNESIUM: 3.8 MMOL/L (ref 3.5–5.1)
PROCALCITONIN: 0.06 NG/ML (ref 0–0.15)
RBC # BLD: 5.41 M/UL (ref 4–5.2)
SEDIMENTATION RATE, ERYTHROCYTE: 64 MM/HR (ref 0–30)
SODIUM BLD-SCNC: 133 MMOL/L (ref 136–145)
TOTAL PROTEIN: 7.7 G/DL (ref 6.4–8.2)
WBC # BLD: 11.2 K/UL (ref 4–11)

## 2021-10-02 PROCEDURE — 36415 COLL VENOUS BLD VENIPUNCTURE: CPT

## 2021-10-02 PROCEDURE — 85379 FIBRIN DEGRADATION QUANT: CPT

## 2021-10-02 PROCEDURE — 2580000003 HC RX 258: Performed by: INTERNAL MEDICINE

## 2021-10-02 PROCEDURE — 85025 COMPLETE CBC W/AUTO DIFF WBC: CPT

## 2021-10-02 PROCEDURE — 6370000000 HC RX 637 (ALT 250 FOR IP): Performed by: INTERNAL MEDICINE

## 2021-10-02 PROCEDURE — 6360000002 HC RX W HCPCS: Performed by: INTERNAL MEDICINE

## 2021-10-02 PROCEDURE — 2500000003 HC RX 250 WO HCPCS: Performed by: INTERNAL MEDICINE

## 2021-10-02 PROCEDURE — 80053 COMPREHEN METABOLIC PANEL: CPT

## 2021-10-02 PROCEDURE — 1200000000 HC SEMI PRIVATE

## 2021-10-02 PROCEDURE — 82728 ASSAY OF FERRITIN: CPT

## 2021-10-02 PROCEDURE — 85652 RBC SED RATE AUTOMATED: CPT

## 2021-10-02 PROCEDURE — 2700000000 HC OXYGEN THERAPY PER DAY

## 2021-10-02 PROCEDURE — 86140 C-REACTIVE PROTEIN: CPT

## 2021-10-02 PROCEDURE — 94760 N-INVAS EAR/PLS OXIMETRY 1: CPT

## 2021-10-02 PROCEDURE — 84145 PROCALCITONIN (PCT): CPT

## 2021-10-02 RX ADMIN — ENOXAPARIN SODIUM 30 MG: 30 INJECTION SUBCUTANEOUS at 20:41

## 2021-10-02 RX ADMIN — DEXAMETHASONE 6 MG: 6 TABLET ORAL at 08:23

## 2021-10-02 RX ADMIN — SODIUM CHLORIDE, PRESERVATIVE FREE 10 ML: 5 INJECTION INTRAVENOUS at 08:27

## 2021-10-02 RX ADMIN — ENOXAPARIN SODIUM 30 MG: 30 INJECTION SUBCUTANEOUS at 08:23

## 2021-10-02 RX ADMIN — ASPIRIN 81 MG CHEWABLE TABLET 81 MG: 81 TABLET CHEWABLE at 08:23

## 2021-10-02 RX ADMIN — INSULIN LISPRO 4 UNITS: 100 INJECTION, SOLUTION INTRAVENOUS; SUBCUTANEOUS at 20:48

## 2021-10-02 RX ADMIN — ACETAMINOPHEN 650 MG: 325 TABLET ORAL at 08:38

## 2021-10-02 RX ADMIN — SODIUM CHLORIDE, PRESERVATIVE FREE 10 ML: 5 INJECTION INTRAVENOUS at 20:41

## 2021-10-02 RX ADMIN — GUAIFENESIN 600 MG: 600 TABLET, EXTENDED RELEASE ORAL at 20:41

## 2021-10-02 RX ADMIN — GUAIFENESIN 600 MG: 600 TABLET, EXTENDED RELEASE ORAL at 08:23

## 2021-10-02 RX ADMIN — REMDESIVIR 100 MG: 100 INJECTION, POWDER, LYOPHILIZED, FOR SOLUTION INTRAVENOUS at 16:04

## 2021-10-02 ASSESSMENT — PAIN SCALES - GENERAL
PAINLEVEL_OUTOF10: 0
PAINLEVEL_OUTOF10: 0

## 2021-10-02 NOTE — PLAN OF CARE
Problem: Falls - Risk of:  Goal: Will remain free from falls  Description: Will remain free from falls  10/2/2021 1149 by Zee Bernal RN  Outcome: Ongoing  10/2/2021 0245 by Tera Rangel RN  Outcome: Ongoing  Goal: Absence of physical injury  Description: Absence of physical injury  Outcome: Ongoing     Problem: Airway Clearance - Ineffective  Goal: Achieve or maintain patent airway  10/2/2021 1149 by Zee Bernal RN  Outcome: Ongoing  10/2/2021 0245 by Tera Rangel RN  Outcome: Ongoing     Problem: Gas Exchange - Impaired  Goal: Absence of hypoxia  10/2/2021 1149 by Zee Bernal RN  Outcome: Ongoing  10/2/2021 0245 by Tera Rangel RN  Outcome: Ongoing  Goal: Promote optimal lung function  10/2/2021 1149 by Zee Bernal RN  Outcome: Ongoing  10/2/2021 0245 by Tera Rangel RN  Outcome: Ongoing     Problem: Breathing Pattern - Ineffective  Goal: Ability to achieve and maintain a regular respiratory rate  10/2/2021 1149 by Zee Bernal RN  Outcome: Ongoing  10/2/2021 0245 by Tera Rangel RN  Outcome: Ongoing     Problem:  Body Temperature -  Risk of, Imbalanced  Goal: Ability to maintain a body temperature within defined limits  10/2/2021 1149 by Zee Bernal RN  Outcome: Ongoing  10/2/2021 0245 by Tera Rangel RN  Outcome: Ongoing  Goal: Will regain or maintain usual level of consciousness  Outcome: Ongoing  Goal: Complications related to the disease process, condition or treatment will be avoided or minimized  Outcome: Ongoing     Problem: Isolation Precautions - Risk of Spread of Infection  Goal: Prevent transmission of infection  10/2/2021 1149 by Zee Bernal RN  Outcome: Ongoing  10/2/2021 0245 by Tera Rangel RN  Outcome: Ongoing     Problem: Nutrition Deficits  Goal: Optimize nutritional status  10/2/2021 1149 by Zee Bernal RN  Outcome: Ongoing  10/2/2021 0245 by Tera Rangel RN  Outcome: Ongoing     Problem: Risk for Fluid Volume Deficit  Goal: Maintain normal heart rhythm  Outcome: Ongoing  Goal: Maintain absence of muscle cramping  Outcome: Ongoing  Goal: Maintain normal serum potassium, sodium, calcium, phosphorus, and pH  Outcome: Ongoing     Problem: Loneliness or Risk for Loneliness  Goal: Demonstrate positive use of time alone when socialization is not possible  Outcome: Ongoing     Problem: Fatigue  Goal: Verbalize increase energy and improved vitality  10/2/2021 1149 by Lieutenant Trevor RN  Outcome: Ongoing  10/2/2021 0245 by Chong Garcia RN  Outcome: Ongoing     Problem: Patient Education: Go to Patient Education Activity  Goal: Patient/Family Education  Outcome: Ongoing

## 2021-10-02 NOTE — PLAN OF CARE
Problem: Falls - Risk of:  Goal: Will remain free from falls  Description: Will remain free from falls  10/2/2021 0245 by Bebo Raphael RN  Outcome: Ongoing     Problem: Airway Clearance - Ineffective  Goal: Achieve or maintain patent airway  10/2/2021 0245 by Bebo Raphael RN  Outcome: Ongoing     Problem: Gas Exchange - Impaired  Goal: Absence of hypoxia  10/2/2021 0245 by Bebo Raphael RN  Outcome: Ongoing     Problem: Gas Exchange - Impaired  Goal: Promote optimal lung function  10/2/2021 0245 by Bebo Raphael RN  Outcome: Ongoing     Problem: Breathing Pattern - Ineffective  Goal: Ability to achieve and maintain a regular respiratory rate  10/2/2021 0245 by Bebo Raphael RN  Outcome: Ongoing     Problem:  Body Temperature -  Risk of, Imbalanced  Goal: Ability to maintain a body temperature within defined limits  10/2/2021 0245 by Bebo Raphael RN  Outcome: Ongoing     Problem: Isolation Precautions - Risk of Spread of Infection  Goal: Prevent transmission of infection  10/2/2021 0245 by Bebo Raphael RN  Outcome: Ongoing     Problem: Nutrition Deficits  Goal: Optimize nutritional status  10/2/2021 0245 by Bebo Raphael RN  Outcome: Ongoing     Problem: Fatigue  Goal: Verbalize increase energy and improved vitality  10/2/2021 0245 by Bebo Raphael RN  Outcome: Ongoing

## 2021-10-02 NOTE — PROGRESS NOTES
Hospitalist Progress Note      PCP: Hattie Castaneda MD    Date of Admission: 9/29/2021    Chief Complaint: shortness of breath    Hospital Course: 61-year-old female with past medical history of hypertension diabetes mellitus, morbid obesity presented to the hospital feeling unwell for the past 2 days. Patient states that she got her first dose of the vaccine on 911. Few days later she had been feeling sick with cough and congestion. She believes that she may have been infected after exposure to her daughter who is also sick. Over the past couple of weeks she said she has been having fever, chills, nausea, congestion, body aches, headaches. She tried to alleviate most of the symptoms at home but with no improvement decided to come to the hospital.  On average she was noted to be hemodynamically stable. Lab work was fairly unremarkable. CT of the chest was performed which showed multifocal airspace disease consistent with Covid pneumonia. Patient was admitted to the hospital for the work-up and management. Subjective: Pt seen and examined. Says still feels feverish although none are documented. Still having cough, but overall feeling a little bit better.       Medications:  Reviewed    Infusion Medications    dextrose      sodium chloride Stopped (09/30/21 1624)     Scheduled Medications    insulin lispro  0-12 Units SubCUTAneous TID WC    insulin lispro  0-6 Units SubCUTAneous Nightly    enoxaparin  30 mg SubCUTAneous BID    guaiFENesin  600 mg Oral BID    remdesivir IVPB  100 mg IntraVENous Q24H    fluticasone  1 spray Nasal Daily    hydroCHLOROthiazide  25 mg Oral Daily    amLODIPine  10 mg Oral Daily    losartan  50 mg Oral Daily    vitamin D  50,000 Units Oral Weekly    atorvastatin  40 mg Oral Daily    aspirin  81 mg Oral Daily    sodium chloride flush  5-40 mL IntraVENous 2 times per day    dexamethasone  6 mg Oral Daily     PRN Meds: glucose, dextrose, glucagon (rDNA), dextrose, sodium chloride flush, sodium chloride, ondansetron **OR** ondansetron, polyethylene glycol, acetaminophen **OR** acetaminophen      Intake/Output Summary (Last 24 hours) at 10/2/2021 1209  Last data filed at 10/1/2021 1343  Gross per 24 hour   Intake 240 ml   Output --   Net 240 ml       Exam:    /79   Pulse 64   Temp 97.9 °F (36.6 °C) (Oral)   Resp 20   Wt 203 lb 4.2 oz (92.2 kg)   LMP 06/01/2014   SpO2 92%   BMI 38.41 kg/m²     General appearance: No apparent distress, appears stated age and cooperative. HEENT: Pupils equal, round, and reactive to light. Conjunctivae/corneas clear. Neck: Supple, with full range of motion. No jugular venous distention. Trachea midline. Respiratory:  Normal respiratory effort. Clear to auscultation, bilaterally without Rales/Wheezes/Rhonchi. Cardiovascular: Regular rate and rhythm with normal S1/S2 without murmurs, rubs or gallops. Abdomen: Soft, non-tender, non-distended with normal bowel sounds. Musculoskeletal: No clubbing, cyanosis or edema bilaterally. Full range of motion without deformity. Skin: Skin color, texture, turgor normal.  No rashes or lesions. Neurologic:  Neurovascularly intact without any focal sensory/motor deficits.  Cranial nerves: II-XII intact, grossly non-focal.  Psychiatric: Alert and oriented, thought content appropriate, normal insight  Capillary Refill: Brisk,< 3 seconds   Peripheral Pulses: +2 palpable, equal bilaterally       Labs:   Recent Labs     09/30/21  0741 10/01/21  0709 10/02/21  0829   WBC 9.3 11.6* 11.2*   HGB 13.7 13.4 13.9   HCT 42.1 42.7 44.2    213 239     Recent Labs     09/30/21  0741 10/01/21  0709 10/02/21  0830    136 133*   K 3.7 3.8 3.8   CL 96* 94* 96*   CO2 26 24 21   BUN 20 27* 23*   CREATININE 1.0 1.0 0.9   CALCIUM 9.0 9.1 9.3     Recent Labs     09/30/21  0923 10/01/21  0709 10/02/21  0830   AST 21 21 17   ALT 18 17 16   BILIDIR <0.2  --   --    BILITOT <0.2 0.3 0.3   ALKPHOS 59 55 57     No results for input(s): INR in the last 72 hours. No results for input(s): Wayna Khat in the last 72 hours. Urinalysis:      Lab Results   Component Value Date    NITRU Negative 01/29/2020    WBCUA 3 12/09/2019    BACTERIA 1+ 12/09/2019    RBCUA 3 12/09/2019    BLOODU Negative 01/29/2020    SPECGRAV 1.026 01/29/2020    GLUCOSEU Negative 01/29/2020       Radiology:  CT CHEST PULMONARY EMBOLISM W CONTRAST   Final Result   Somewhat limited evaluation for pulmonary embolism due to suboptimal contrast   bolus timing. No evidence of central pulmonary embolism. Multifocal airspace disease, given history of COVID, this is consistent with   COVID pneumonia.                  Assessment/Plan:    Active Hospital Problems    Diagnosis Date Noted    COVID-19 [U07.1] 09/29/2021       Covid-19 pneumonia  Acute hypoxic respiratory failure  -patient is mildly hypoxic with 88% on room air, will start Decadron  -IV Remdesivir per family request reasonable given hypoxia; complete 5 days of therapy  -Continue symptomatic management for now  --inflammatory markers improving    Remdesivir Initiation Note    This patient meets criteria for initiation of remdesivir based on the following:  · Proven COVID-19  · Moderate disease (Requiring supplemental O2)  · Acceptable hepatic function (ALT within 5 times ULN)    Exclusion Criteria:   Severe disease requiring invasive or non-invasive mechanical ventilation (includes HFNC & BiPAP)   Could consider use in patients requiring high flow if early on in the disease course (based on symptom duration)   Use of more than 1 vasopressor prior to remdesivir initiation   Already improving on supportive treatment and/or impending discharge   Patients in whom the clinical team think death is in the immediate short-term where remdesivir is unlikely to change the clinical outcome     Liver function tests will be monitored daily while on remdesivir.       Hypertension  -Continue home blood pressure medications     Diabetes mellitus  -Continue insulin sliding scale  --hypoglycemia protocol  --POCT glucose checks  --carb control diet     Morbid obesity with BMI of 38  -Complicating medical problem and placing patient at high risk of morbidity and mortality    DVT Prophylaxis: Lovenox  Diet: ADULT DIET;  Regular; 3 carb choices (45 gm/meal)  Code Status: Full Code    PT/OT Eval Status: defer    Dispo - continue care    Zach Ortega MD

## 2021-10-03 LAB
A/G RATIO: 0.8 (ref 1.1–2.2)
ALBUMIN SERPL-MCNC: 3.1 G/DL (ref 3.4–5)
ALP BLD-CCNC: 58 U/L (ref 40–129)
ALT SERPL-CCNC: 15 U/L (ref 10–40)
ANION GAP SERPL CALCULATED.3IONS-SCNC: 16 MMOL/L (ref 3–16)
ANISOCYTOSIS: ABNORMAL
AST SERPL-CCNC: 15 U/L (ref 15–37)
BASOPHILS ABSOLUTE: 0 K/UL (ref 0–0.2)
BASOPHILS RELATIVE PERCENT: 0.3 %
BILIRUB SERPL-MCNC: 0.3 MG/DL (ref 0–1)
BUN BLDV-MCNC: 16 MG/DL (ref 7–20)
C-REACTIVE PROTEIN: 40.2 MG/L (ref 0–5.1)
CALCIUM SERPL-MCNC: 8.8 MG/DL (ref 8.3–10.6)
CHLORIDE BLD-SCNC: 96 MMOL/L (ref 99–110)
CO2: 22 MMOL/L (ref 21–32)
CREAT SERPL-MCNC: 0.7 MG/DL (ref 0.6–1.1)
D DIMER: 276 NG/ML DDU (ref 0–229)
EOSINOPHILS ABSOLUTE: 0 K/UL (ref 0–0.6)
EOSINOPHILS RELATIVE PERCENT: 0.1 %
FERRITIN: 1147 NG/ML (ref 15–150)
GFR AFRICAN AMERICAN: >60
GFR NON-AFRICAN AMERICAN: >60
GLOBULIN: 4 G/DL
GLUCOSE BLD-MCNC: 193 MG/DL (ref 70–99)
GLUCOSE BLD-MCNC: 203 MG/DL (ref 70–99)
GLUCOSE BLD-MCNC: 203 MG/DL (ref 70–99)
GLUCOSE BLD-MCNC: 249 MG/DL (ref 70–99)
GLUCOSE BLD-MCNC: 264 MG/DL (ref 70–99)
HCT VFR BLD CALC: 45 % (ref 36–48)
HEMOGLOBIN: 14 G/DL (ref 12–16)
LYMPHOCYTES ABSOLUTE: 0.8 K/UL (ref 1–5.1)
LYMPHOCYTES RELATIVE PERCENT: 7.8 %
MCH RBC QN AUTO: 25.6 PG (ref 26–34)
MCHC RBC AUTO-ENTMCNC: 31 G/DL (ref 31–36)
MCV RBC AUTO: 82.6 FL (ref 80–100)
MONOCYTES ABSOLUTE: 1.3 K/UL (ref 0–1.3)
MONOCYTES RELATIVE PERCENT: 11.9 %
NEUTROPHILS ABSOLUTE: 8.6 K/UL (ref 1.7–7.7)
NEUTROPHILS RELATIVE PERCENT: 79.9 %
PDW BLD-RTO: 15.3 % (ref 12.4–15.4)
PERFORMED ON: ABNORMAL
PLATELET # BLD: 266 K/UL (ref 135–450)
PLATELET SLIDE REVIEW: ADEQUATE
PMV BLD AUTO: 9.6 FL (ref 5–10.5)
POTASSIUM REFLEX MAGNESIUM: 3.8 MMOL/L (ref 3.5–5.1)
PROCALCITONIN: 0.05 NG/ML (ref 0–0.15)
RBC # BLD: 5.45 M/UL (ref 4–5.2)
REASON FOR REJECTION: NORMAL
REJECTED TEST: NORMAL
SEDIMENTATION RATE, ERYTHROCYTE: 56 MM/HR (ref 0–30)
SLIDE REVIEW: ABNORMAL
SODIUM BLD-SCNC: 134 MMOL/L (ref 136–145)
TOTAL PROTEIN: 7.1 G/DL (ref 6.4–8.2)
WBC # BLD: 10.8 K/UL (ref 4–11)

## 2021-10-03 PROCEDURE — 85379 FIBRIN DEGRADATION QUANT: CPT

## 2021-10-03 PROCEDURE — 2580000003 HC RX 258: Performed by: INTERNAL MEDICINE

## 2021-10-03 PROCEDURE — 6370000000 HC RX 637 (ALT 250 FOR IP): Performed by: INTERNAL MEDICINE

## 2021-10-03 PROCEDURE — 36415 COLL VENOUS BLD VENIPUNCTURE: CPT

## 2021-10-03 PROCEDURE — 80053 COMPREHEN METABOLIC PANEL: CPT

## 2021-10-03 PROCEDURE — 94761 N-INVAS EAR/PLS OXIMETRY MLT: CPT

## 2021-10-03 PROCEDURE — 86140 C-REACTIVE PROTEIN: CPT

## 2021-10-03 PROCEDURE — 6360000002 HC RX W HCPCS: Performed by: INTERNAL MEDICINE

## 2021-10-03 PROCEDURE — 84145 PROCALCITONIN (PCT): CPT

## 2021-10-03 PROCEDURE — 85025 COMPLETE CBC W/AUTO DIFF WBC: CPT

## 2021-10-03 PROCEDURE — 2500000003 HC RX 250 WO HCPCS: Performed by: INTERNAL MEDICINE

## 2021-10-03 PROCEDURE — 85652 RBC SED RATE AUTOMATED: CPT

## 2021-10-03 PROCEDURE — 2700000000 HC OXYGEN THERAPY PER DAY

## 2021-10-03 PROCEDURE — 82728 ASSAY OF FERRITIN: CPT

## 2021-10-03 PROCEDURE — 1200000000 HC SEMI PRIVATE

## 2021-10-03 RX ORDER — INSULIN GLARGINE 100 [IU]/ML
10 INJECTION, SOLUTION SUBCUTANEOUS NIGHTLY
Status: DISCONTINUED | OUTPATIENT
Start: 2021-10-03 | End: 2021-10-04

## 2021-10-03 RX ORDER — DOCUSATE SODIUM 100 MG/1
100 CAPSULE, LIQUID FILLED ORAL DAILY
Status: DISCONTINUED | OUTPATIENT
Start: 2021-10-03 | End: 2021-10-06 | Stop reason: HOSPADM

## 2021-10-03 RX ADMIN — INSULIN LISPRO 4 UNITS: 100 INJECTION, SOLUTION INTRAVENOUS; SUBCUTANEOUS at 12:23

## 2021-10-03 RX ADMIN — GUAIFENESIN 600 MG: 600 TABLET, EXTENDED RELEASE ORAL at 21:29

## 2021-10-03 RX ADMIN — ENOXAPARIN SODIUM 30 MG: 30 INJECTION SUBCUTANEOUS at 09:32

## 2021-10-03 RX ADMIN — DOCUSATE SODIUM 100 MG: 100 CAPSULE ORAL at 12:23

## 2021-10-03 RX ADMIN — GUAIFENESIN 600 MG: 600 TABLET, EXTENDED RELEASE ORAL at 09:32

## 2021-10-03 RX ADMIN — ENOXAPARIN SODIUM 30 MG: 30 INJECTION SUBCUTANEOUS at 21:29

## 2021-10-03 RX ADMIN — ASPIRIN 81 MG CHEWABLE TABLET 81 MG: 81 TABLET CHEWABLE at 09:32

## 2021-10-03 RX ADMIN — INSULIN LISPRO 6 UNITS: 100 INJECTION, SOLUTION INTRAVENOUS; SUBCUTANEOUS at 17:43

## 2021-10-03 RX ADMIN — DEXAMETHASONE 6 MG: 6 TABLET ORAL at 09:31

## 2021-10-03 RX ADMIN — SODIUM CHLORIDE, PRESERVATIVE FREE 10 ML: 5 INJECTION INTRAVENOUS at 21:29

## 2021-10-03 RX ADMIN — REMDESIVIR 100 MG: 100 INJECTION, POWDER, LYOPHILIZED, FOR SOLUTION INTRAVENOUS at 15:25

## 2021-10-03 RX ADMIN — FLUTICASONE PROPIONATE 1 SPRAY: 50 SPRAY, METERED NASAL at 08:17

## 2021-10-03 RX ADMIN — INSULIN GLARGINE 10 UNITS: 100 INJECTION, SOLUTION SUBCUTANEOUS at 21:35

## 2021-10-03 RX ADMIN — AMLODIPINE BESYLATE 10 MG: 10 TABLET ORAL at 09:32

## 2021-10-03 RX ADMIN — INSULIN LISPRO 2 UNITS: 100 INJECTION, SOLUTION INTRAVENOUS; SUBCUTANEOUS at 21:35

## 2021-10-03 RX ADMIN — ATORVASTATIN CALCIUM 40 MG: 40 TABLET, FILM COATED ORAL at 09:31

## 2021-10-03 ASSESSMENT — PAIN SCALES - GENERAL: PAINLEVEL_OUTOF10: 0

## 2021-10-03 NOTE — PLAN OF CARE
Problem: Falls - Risk of:  Goal: Will remain free from falls  Description: Will remain free from falls  10/2/2021 2343 by Miroslava Acevedo RN  Outcome: Ongoing     Problem: Airway Clearance - Ineffective  Goal: Achieve or maintain patent airway  10/2/2021 2343 by Miroslava Acevedo RN  Outcome: Ongoing     Problem: Gas Exchange - Impaired  Goal: Absence of hypoxia  10/2/2021 2343 by Miroslava Acevedo RN  Outcome: Ongoing     Problem: Gas Exchange - Impaired  Goal: Promote optimal lung function  10/2/2021 2343 by Miroslava Acevedo RN  Outcome: Ongoing     Problem: Breathing Pattern - Ineffective  Goal: Ability to achieve and maintain a regular respiratory rate  10/2/2021 2343 by Miroslava Acevedo RN  Outcome: Ongoing     Problem: Isolation Precautions - Risk of Spread of Infection  Goal: Prevent transmission of infection  10/2/2021 2343 by Miroslava Acevedo RN  Outcome: Ongoing     Problem: Nutrition Deficits  Goal: Optimize nutritional status  10/2/2021 2343 by Miroslava Acevedo RN  Outcome: Ongoing

## 2021-10-03 NOTE — PROGRESS NOTES
glucagon (rDNA), dextrose, sodium chloride flush, sodium chloride, ondansetron **OR** ondansetron, polyethylene glycol, acetaminophen **OR** acetaminophen      Intake/Output Summary (Last 24 hours) at 10/3/2021 1156  Last data filed at 10/2/2021 1801  Gross per 24 hour   Intake 240 ml   Output --   Net 240 ml       Exam:    /75   Pulse 65   Temp 97.9 °F (36.6 °C) (Oral)   Resp 18   Wt 202 lb 2.6 oz (91.7 kg)   LMP 06/01/2014   SpO2 90%   BMI 38.20 kg/m²     General appearance: No apparent distress, appears stated age and cooperative. HEENT: Pupils equal, round, and reactive to light. Conjunctivae/corneas clear. Neck: Supple, with full range of motion. No jugular venous distention. Trachea midline. Respiratory:  Normal respiratory effort. Clear to auscultation, bilaterally without Rales/Wheezes/Rhonchi. Cardiovascular: Regular rate and rhythm with normal S1/S2 without murmurs, rubs or gallops. Abdomen: Soft, non-tender, non-distended with normal bowel sounds. Musculoskeletal: No clubbing, cyanosis or edema bilaterally. Full range of motion without deformity. Skin: Skin color, texture, turgor normal.  No rashes or lesions. Neurologic:  Neurovascularly intact without any focal sensory/motor deficits.  Cranial nerves: II-XII intact, grossly non-focal.  Psychiatric: Alert and oriented, thought content appropriate, normal insight  Capillary Refill: Brisk,< 3 seconds   Peripheral Pulses: +2 palpable, equal bilaterally       Labs:   Recent Labs     10/01/21  0709 10/02/21  0829 10/03/21  0804   WBC 11.6* 11.2* 10.8   HGB 13.4 13.9 14.0   HCT 42.7 44.2 45.0    239 266     Recent Labs     10/01/21  0709 10/02/21  0830 10/03/21  0804    133* 134*   K 3.8 3.8 3.8   CL 94* 96* 96*   CO2 24 21 22   BUN 27* 23* 16   CREATININE 1.0 0.9 0.7   CALCIUM 9.1 9.3 8.8     Recent Labs     10/01/21  0709 10/02/21  0830 10/03/21  0804   AST 21 17 15   ALT 17 16 15   BILITOT 0.3 0.3 0.3   ALKPHOS 55 57 58 No results for input(s): INR in the last 72 hours. No results for input(s): [facility] in the last 72 hours. Urinalysis:      Lab Results   Component Value Date    NITRU Negative 01/29/2020    WBCUA 3 12/09/2019    BACTERIA 1+ 12/09/2019    RBCUA 3 12/09/2019    BLOODU Negative 01/29/2020    SPECGRAV 1.026 01/29/2020    GLUCOSEU Negative 01/29/2020       Radiology:  CT CHEST PULMONARY EMBOLISM W CONTRAST   Final Result   Somewhat limited evaluation for pulmonary embolism due to suboptimal contrast   bolus timing. No evidence of central pulmonary embolism. Multifocal airspace disease, given history of COVID, this is consistent with   COVID pneumonia.                  Assessment/Plan:    Active Hospital Problems    Diagnosis Date Noted    COVID-19 [U07.1] 09/29/2021       Covid-19 pneumonia  Acute hypoxic respiratory failure  -patient is mildly hypoxic with 88% on room air, will start Decadron  -IV Remdesivir per family request reasonable given hypoxia; complete 5 days of therapy  -Continue symptomatic management for now  --inflammatory markers improving    Remdesivir Initiation Note    This patient meets criteria for initiation of remdesivir based on the following:  · Proven COVID-19  · Moderate disease (Requiring supplemental O2)  · Acceptable hepatic function (ALT within 5 times ULN)    Exclusion Criteria:   Severe disease requiring invasive or non-invasive mechanical ventilation (includes HFNC & BiPAP)   Could consider use in patients requiring high flow if early on in the disease course (based on symptom duration)   Use of more than 1 vasopressor prior to remdesivir initiation   Already improving on supportive treatment and/or impending discharge   Patients in whom the clinical team think death is in the immediate short-term where remdesivir is unlikely to change the clinical outcome     Liver function tests will be monitored daily while on remdesivir.       Hypertension  -Continue home blood pressure medications     Diabetes mellitus  -Continue insulin sliding scale  --Lantus  --hypoglycemia protocol  --POCT glucose checks  --carb control diet     Morbid obesity with BMI of 38  -Complicating medical problem and placing patient at high risk of morbidity and mortality    DVT Prophylaxis: Lovenox  Diet: ADULT DIET;  Regular; 3 carb choices (45 gm/meal)  Code Status: Full Code    PT/OT Eval Status: defer    Dispo - continue care    Ernesto Cochran MD

## 2021-10-03 NOTE — PROGRESS NOTES
Pt refusing her medications this AM.  Message sent to MD to make him aware. Will await any new orders.

## 2021-10-03 NOTE — PLAN OF CARE
Problem: Falls - Risk of:  Goal: Will remain free from falls  Description: Will remain free from falls  10/3/2021 1105 by Reba Mccollum RN  Outcome: Ongoing  10/2/2021 2343 by Nicole Michael RN  Outcome: Ongoing  Goal: Absence of physical injury  Description: Absence of physical injury  Outcome: Ongoing     Problem: Airway Clearance - Ineffective  Goal: Achieve or maintain patent airway  10/3/2021 1105 by Reba Mccollum RN  Outcome: Ongoing  10/2/2021 2343 by Nicole Michael RN  Outcome: Ongoing     Problem: Gas Exchange - Impaired  Goal: Absence of hypoxia  10/3/2021 1105 by Reba Mccollum RN  Outcome: Ongoing  10/2/2021 2343 by Nicole Michael RN  Outcome: Ongoing  Goal: Promote optimal lung function  10/3/2021 1105 by Reba Mccollum RN  Outcome: Ongoing  10/2/2021 2343 by Nicole Michael RN  Outcome: Ongoing     Problem: Breathing Pattern - Ineffective  Goal: Ability to achieve and maintain a regular respiratory rate  10/3/2021 1105 by Reba Mccollum RN  Outcome: Ongoing  10/2/2021 2343 by Nicole Michael RN  Outcome: Ongoing     Problem:  Body Temperature -  Risk of, Imbalanced  Goal: Ability to maintain a body temperature within defined limits  Outcome: Ongoing  Goal: Will regain or maintain usual level of consciousness  Outcome: Ongoing  Goal: Complications related to the disease process, condition or treatment will be avoided or minimized  Outcome: Ongoing     Problem: Isolation Precautions - Risk of Spread of Infection  Goal: Prevent transmission of infection  10/3/2021 1105 by Reba Mccollum RN  Outcome: Ongoing  10/2/2021 2343 by Nicole Michael RN  Outcome: Ongoing     Problem: Nutrition Deficits  Goal: Optimize nutritional status  10/3/2021 1105 by Reba Mccollum RN  Outcome: Ongoing  10/2/2021 2343 by Nicole Michael RN  Outcome: Ongoing     Problem: Risk for Fluid Volume Deficit  Goal: Maintain normal heart rhythm  Outcome: Ongoing  Goal: Maintain absence of muscle cramping  Outcome: Ongoing  Goal: Maintain normal serum potassium, sodium, calcium, phosphorus, and pH  Outcome: Ongoing     Problem: Loneliness or Risk for Loneliness  Goal: Demonstrate positive use of time alone when socialization is not possible  Outcome: Ongoing     Problem: Fatigue  Goal: Verbalize increase energy and improved vitality  Outcome: Ongoing     Problem: Patient Education: Go to Patient Education Activity  Goal: Patient/Family Education  Outcome: Ongoing

## 2021-10-04 LAB
A/G RATIO: 0.9 (ref 1.1–2.2)
ALBUMIN SERPL-MCNC: 3.2 G/DL (ref 3.4–5)
ALP BLD-CCNC: 55 U/L (ref 40–129)
ALT SERPL-CCNC: 13 U/L (ref 10–40)
ANION GAP SERPL CALCULATED.3IONS-SCNC: 13 MMOL/L (ref 3–16)
AST SERPL-CCNC: 10 U/L (ref 15–37)
BASOPHILS ABSOLUTE: 0 K/UL (ref 0–0.2)
BASOPHILS RELATIVE PERCENT: 0 %
BILIRUB SERPL-MCNC: 0.3 MG/DL (ref 0–1)
BUN BLDV-MCNC: 16 MG/DL (ref 7–20)
C-REACTIVE PROTEIN: 19.6 MG/L (ref 0–5.1)
CALCIUM SERPL-MCNC: 8.9 MG/DL (ref 8.3–10.6)
CHLORIDE BLD-SCNC: 100 MMOL/L (ref 99–110)
CO2: 24 MMOL/L (ref 21–32)
CREAT SERPL-MCNC: 0.7 MG/DL (ref 0.6–1.1)
D DIMER: 250 NG/ML DDU (ref 0–229)
EOSINOPHILS ABSOLUTE: 0 K/UL (ref 0–0.6)
EOSINOPHILS RELATIVE PERCENT: 0 %
FERRITIN: 1061 NG/ML (ref 15–150)
GFR AFRICAN AMERICAN: >60
GFR NON-AFRICAN AMERICAN: >60
GLOBULIN: 3.4 G/DL
GLUCOSE BLD-MCNC: 190 MG/DL (ref 70–99)
GLUCOSE BLD-MCNC: 198 MG/DL (ref 70–99)
GLUCOSE BLD-MCNC: 202 MG/DL (ref 70–99)
GLUCOSE BLD-MCNC: 260 MG/DL (ref 70–99)
GLUCOSE BLD-MCNC: 321 MG/DL (ref 70–99)
HCT VFR BLD CALC: 43.3 % (ref 36–48)
HEMOGLOBIN: 13.9 G/DL (ref 12–16)
LYMPHOCYTES ABSOLUTE: 1 K/UL (ref 1–5.1)
LYMPHOCYTES RELATIVE PERCENT: 9 %
MCH RBC QN AUTO: 26.1 PG (ref 26–34)
MCHC RBC AUTO-ENTMCNC: 32.2 G/DL (ref 31–36)
MCV RBC AUTO: 81.3 FL (ref 80–100)
MONOCYTES ABSOLUTE: 1 K/UL (ref 0–1.3)
MONOCYTES RELATIVE PERCENT: 9 %
MYELOCYTE PERCENT: 1 %
NEUTROPHILS ABSOLUTE: 8.9 K/UL (ref 1.7–7.7)
NEUTROPHILS RELATIVE PERCENT: 81 %
PDW BLD-RTO: 15.1 % (ref 12.4–15.4)
PERFORMED ON: ABNORMAL
PLATELET # BLD: 305 K/UL (ref 135–450)
PMV BLD AUTO: 9.7 FL (ref 5–10.5)
POTASSIUM REFLEX MAGNESIUM: 4 MMOL/L (ref 3.5–5.1)
PROCALCITONIN: 0.04 NG/ML (ref 0–0.15)
RBC # BLD: 5.33 M/UL (ref 4–5.2)
RBC # BLD: NORMAL 10*6/UL
SEDIMENTATION RATE, ERYTHROCYTE: 40 MM/HR (ref 0–30)
SODIUM BLD-SCNC: 137 MMOL/L (ref 136–145)
TOTAL PROTEIN: 6.6 G/DL (ref 6.4–8.2)
WBC # BLD: 10.8 K/UL (ref 4–11)

## 2021-10-04 PROCEDURE — 1200000000 HC SEMI PRIVATE

## 2021-10-04 PROCEDURE — 84145 PROCALCITONIN (PCT): CPT

## 2021-10-04 PROCEDURE — 2580000003 HC RX 258: Performed by: INTERNAL MEDICINE

## 2021-10-04 PROCEDURE — 85379 FIBRIN DEGRADATION QUANT: CPT

## 2021-10-04 PROCEDURE — 82728 ASSAY OF FERRITIN: CPT

## 2021-10-04 PROCEDURE — 86140 C-REACTIVE PROTEIN: CPT

## 2021-10-04 PROCEDURE — 94760 N-INVAS EAR/PLS OXIMETRY 1: CPT

## 2021-10-04 PROCEDURE — 6360000002 HC RX W HCPCS: Performed by: INTERNAL MEDICINE

## 2021-10-04 PROCEDURE — 6370000000 HC RX 637 (ALT 250 FOR IP): Performed by: INTERNAL MEDICINE

## 2021-10-04 PROCEDURE — 85652 RBC SED RATE AUTOMATED: CPT

## 2021-10-04 PROCEDURE — 36415 COLL VENOUS BLD VENIPUNCTURE: CPT

## 2021-10-04 PROCEDURE — 2500000003 HC RX 250 WO HCPCS: Performed by: INTERNAL MEDICINE

## 2021-10-04 PROCEDURE — 85025 COMPLETE CBC W/AUTO DIFF WBC: CPT

## 2021-10-04 PROCEDURE — 80053 COMPREHEN METABOLIC PANEL: CPT

## 2021-10-04 RX ORDER — INSULIN GLARGINE 100 [IU]/ML
12 INJECTION, SOLUTION SUBCUTANEOUS NIGHTLY
Status: DISCONTINUED | OUTPATIENT
Start: 2021-10-04 | End: 2021-10-06 | Stop reason: HOSPADM

## 2021-10-04 RX ADMIN — DOCUSATE SODIUM 100 MG: 100 CAPSULE ORAL at 09:43

## 2021-10-04 RX ADMIN — GUAIFENESIN 600 MG: 600 TABLET, EXTENDED RELEASE ORAL at 09:43

## 2021-10-04 RX ADMIN — SODIUM CHLORIDE, PRESERVATIVE FREE 10 ML: 5 INJECTION INTRAVENOUS at 21:08

## 2021-10-04 RX ADMIN — INSULIN LISPRO 6 UNITS: 100 INJECTION, SOLUTION INTRAVENOUS; SUBCUTANEOUS at 17:00

## 2021-10-04 RX ADMIN — SODIUM CHLORIDE, PRESERVATIVE FREE 5 ML: 5 INJECTION INTRAVENOUS at 09:38

## 2021-10-04 RX ADMIN — ASPIRIN 81 MG CHEWABLE TABLET 81 MG: 81 TABLET CHEWABLE at 09:45

## 2021-10-04 RX ADMIN — ENOXAPARIN SODIUM 30 MG: 30 INJECTION SUBCUTANEOUS at 21:15

## 2021-10-04 RX ADMIN — ENOXAPARIN SODIUM 30 MG: 30 INJECTION SUBCUTANEOUS at 09:43

## 2021-10-04 RX ADMIN — INSULIN LISPRO 4 UNITS: 100 INJECTION, SOLUTION INTRAVENOUS; SUBCUTANEOUS at 21:16

## 2021-10-04 RX ADMIN — REMDESIVIR 100 MG: 100 INJECTION, POWDER, LYOPHILIZED, FOR SOLUTION INTRAVENOUS at 15:32

## 2021-10-04 RX ADMIN — ATORVASTATIN CALCIUM 40 MG: 40 TABLET, FILM COATED ORAL at 09:45

## 2021-10-04 RX ADMIN — INSULIN LISPRO 2 UNITS: 100 INJECTION, SOLUTION INTRAVENOUS; SUBCUTANEOUS at 12:57

## 2021-10-04 RX ADMIN — AMLODIPINE BESYLATE 10 MG: 10 TABLET ORAL at 09:45

## 2021-10-04 RX ADMIN — INSULIN LISPRO 2 UNITS: 100 INJECTION, SOLUTION INTRAVENOUS; SUBCUTANEOUS at 09:44

## 2021-10-04 RX ADMIN — FLUTICASONE PROPIONATE 1 SPRAY: 50 SPRAY, METERED NASAL at 09:45

## 2021-10-04 RX ADMIN — DEXAMETHASONE 6 MG: 6 TABLET ORAL at 09:45

## 2021-10-04 RX ADMIN — GUAIFENESIN 600 MG: 600 TABLET, EXTENDED RELEASE ORAL at 21:15

## 2021-10-04 RX ADMIN — INSULIN GLARGINE 12 UNITS: 100 INJECTION, SOLUTION SUBCUTANEOUS at 21:16

## 2021-10-04 ASSESSMENT — PAIN SCALES - GENERAL: PAINLEVEL_OUTOF10: 0

## 2021-10-04 NOTE — PLAN OF CARE
Problem: Falls - Risk of:  Goal: Will remain free from falls  Description: Will remain free from falls  10/4/2021 0042 by Tera Rangel RN  Outcome: Ongoing     Problem: Airway Clearance - Ineffective  Goal: Achieve or maintain patent airway  10/4/2021 0042 by Tera Rangel RN  Outcome: Ongoing     Problem: Gas Exchange - Impaired  Goal: Promote optimal lung function  10/4/2021 0042 by Tera Rangel RN  Outcome: Ongoing     Problem: Isolation Precautions - Risk of Spread of Infection  Goal: Prevent transmission of infection  10/4/2021 0042 by Tera Rangel RN  Outcome: Ongoing     Problem: Risk for Fluid Volume Deficit  Goal: Maintain absence of muscle cramping  10/4/2021 0042 by Tera Rangel RN  Outcome: Ongoing     Problem: Loneliness or Risk for Loneliness  Goal: Demonstrate positive use of time alone when socialization is not possible  10/4/2021 0042 by Tera Rangel RN  Outcome: Ongoing     Problem: Fatigue  Goal: Verbalize increase energy and improved vitality  10/4/2021 0042 by Tera Rangel RN  Outcome: Ongoing

## 2021-10-04 NOTE — PROGRESS NOTES
Meds: glucose, dextrose, glucagon (rDNA), dextrose, sodium chloride flush, sodium chloride, ondansetron **OR** ondansetron, polyethylene glycol, acetaminophen **OR** acetaminophen    No intake or output data in the 24 hours ending 10/04/21 1134    Exam:    /80   Pulse 50   Temp 98.1 °F (36.7 °C) (Oral)   Resp 18   Wt 201 lb 1 oz (91.2 kg)   LMP 06/01/2014   SpO2 94%   BMI 37.99 kg/m²     General appearance: No apparent distress, appears stated age and cooperative. HEENT: Pupils equal, round, and reactive to light. Conjunctivae/corneas clear. Neck: Supple, with full range of motion. No jugular venous distention. Trachea midline. Respiratory:  Normal respiratory effort. Clear to auscultation, bilaterally without Rales/Wheezes/Rhonchi. Cardiovascular: Regular rate and rhythm with normal S1/S2 without murmurs, rubs or gallops. Abdomen: Soft, non-tender, non-distended with normal bowel sounds. Musculoskeletal: No clubbing, cyanosis or edema bilaterally. Full range of motion without deformity. Skin: Skin color, texture, turgor normal.  No rashes or lesions. Neurologic:  Neurovascularly intact without any focal sensory/motor deficits. Cranial nerves: II-XII intact, grossly non-focal.  Psychiatric: Alert and oriented, thought content appropriate, normal insight  Capillary Refill: Brisk,< 3 seconds   Peripheral Pulses: +2 palpable, equal bilaterally       Labs:   Recent Labs     10/02/21  0829 10/03/21  0804 10/04/21  0743   WBC 11.2* 10.8 10.8   HGB 13.9 14.0 13.9   HCT 44.2 45.0 43.3    266 305     Recent Labs     10/02/21  0830 10/03/21  0804 10/04/21  0743   * 134* 137   K 3.8 3.8 4.0   CL 96* 96* 100   CO2 21 22 24   BUN 23* 16 16   CREATININE 0.9 0.7 0.7   CALCIUM 9.3 8.8 8.9     Recent Labs     10/02/21  0830 10/03/21  0804 10/04/21  0743   AST 17 15 10*   ALT 16 15 13   BILITOT 0.3 0.3 0.3   ALKPHOS 57 58 55     No results for input(s): INR in the last 72 hours.   No results for input(s): Joy Metz in the last 72 hours. Urinalysis:      Lab Results   Component Value Date    NITRU Negative 01/29/2020    WBCUA 3 12/09/2019    BACTERIA 1+ 12/09/2019    RBCUA 3 12/09/2019    BLOODU Negative 01/29/2020    SPECGRAV 1.026 01/29/2020    GLUCOSEU Negative 01/29/2020       Radiology:  CT CHEST PULMONARY EMBOLISM W CONTRAST   Final Result   Somewhat limited evaluation for pulmonary embolism due to suboptimal contrast   bolus timing. No evidence of central pulmonary embolism. Multifocal airspace disease, given history of COVID, this is consistent with   COVID pneumonia.                  Assessment/Plan:    Active Hospital Problems    Diagnosis Date Noted    COVID-19 [U07.1] 09/29/2021       Covid-19 pneumonia  Acute hypoxic respiratory failure  -patient is mildly hypoxic with 88% on room air, will start Decadron  -IV Remdesivir per family request reasonable given hypoxia; complete 5 days of therapy  -Continue symptomatic management for now  --inflammatory markers improving    Remdesivir Initiation Note    This patient meets criteria for initiation of remdesivir based on the following:  · Proven COVID-19  · Moderate disease (Requiring supplemental O2)  · Acceptable hepatic function (ALT within 5 times ULN)    Exclusion Criteria:   Severe disease requiring invasive or non-invasive mechanical ventilation (includes HFNC & BiPAP)   Could consider use in patients requiring high flow if early on in the disease course (based on symptom duration)   Use of more than 1 vasopressor prior to remdesivir initiation   Already improving on supportive treatment and/or impending discharge   Patients in whom the clinical team think death is in the immediate short-term where remdesivir is unlikely to change the clinical outcome     Liver function tests will be monitored daily while on remdesivir.       Hypertension  -Continue home blood pressure medications     Diabetes mellitus  -Continue insulin sliding scale  --Lantus  --hypoglycemia protocol  --POCT glucose checks  --carb control diet     Morbid obesity with BMI of 38  -Complicating medical problem and placing patient at high risk of morbidity and mortality    DVT Prophylaxis: Lovenox  Diet: ADULT DIET;  Regular; 3 carb choices (45 gm/meal)  Code Status: Full Code    PT/OT Eval Status: defer    Dispo - continue care, anticipate discharge in 1-2 days    Sharlene Erazo MD

## 2021-10-05 LAB
A/G RATIO: 0.9 (ref 1.1–2.2)
ALBUMIN SERPL-MCNC: 3.1 G/DL (ref 3.4–5)
ALP BLD-CCNC: 57 U/L (ref 40–129)
ALT SERPL-CCNC: 12 U/L (ref 10–40)
ANION GAP SERPL CALCULATED.3IONS-SCNC: 12 MMOL/L (ref 3–16)
AST SERPL-CCNC: 13 U/L (ref 15–37)
ATYPICAL LYMPHOCYTE RELATIVE PERCENT: 2 % (ref 0–6)
BANDED NEUTROPHILS RELATIVE PERCENT: 5 % (ref 0–7)
BASOPHILS ABSOLUTE: 0 K/UL (ref 0–0.2)
BASOPHILS RELATIVE PERCENT: 0 %
BILIRUB SERPL-MCNC: 0.3 MG/DL (ref 0–1)
BUN BLDV-MCNC: 17 MG/DL (ref 7–20)
C-REACTIVE PROTEIN: 10.3 MG/L (ref 0–5.1)
CALCIUM SERPL-MCNC: 9 MG/DL (ref 8.3–10.6)
CHLORIDE BLD-SCNC: 94 MMOL/L (ref 99–110)
CO2: 24 MMOL/L (ref 21–32)
CREAT SERPL-MCNC: 0.8 MG/DL (ref 0.6–1.1)
D DIMER: 235 NG/ML DDU (ref 0–229)
EOSINOPHILS ABSOLUTE: 0 K/UL (ref 0–0.6)
EOSINOPHILS RELATIVE PERCENT: 0 %
FERRITIN: 1122 NG/ML (ref 15–150)
GFR AFRICAN AMERICAN: >60
GFR NON-AFRICAN AMERICAN: >60
GLOBULIN: 3.6 G/DL
GLUCOSE BLD-MCNC: 153 MG/DL (ref 70–99)
GLUCOSE BLD-MCNC: 201 MG/DL (ref 70–99)
GLUCOSE BLD-MCNC: 211 MG/DL (ref 70–99)
GLUCOSE BLD-MCNC: 251 MG/DL (ref 70–99)
GLUCOSE BLD-MCNC: 387 MG/DL (ref 70–99)
HCT VFR BLD CALC: 45.2 % (ref 36–48)
HEMOGLOBIN: 14.3 G/DL (ref 12–16)
LYMPHOCYTES ABSOLUTE: 1.7 K/UL (ref 1–5.1)
LYMPHOCYTES RELATIVE PERCENT: 14 %
MCH RBC QN AUTO: 25.9 PG (ref 26–34)
MCHC RBC AUTO-ENTMCNC: 31.6 G/DL (ref 31–36)
MCV RBC AUTO: 82 FL (ref 80–100)
MONOCYTES ABSOLUTE: 0.5 K/UL (ref 0–1.3)
MONOCYTES RELATIVE PERCENT: 5 %
NEUTROPHILS ABSOLUTE: 8.5 K/UL (ref 1.7–7.7)
NEUTROPHILS RELATIVE PERCENT: 74 %
PDW BLD-RTO: 14.9 % (ref 12.4–15.4)
PERFORMED ON: ABNORMAL
PLATELET # BLD: 348 K/UL (ref 135–450)
PLATELET SLIDE REVIEW: ADEQUATE
PMV BLD AUTO: 9.6 FL (ref 5–10.5)
POTASSIUM REFLEX MAGNESIUM: 3.6 MMOL/L (ref 3.5–5.1)
PROCALCITONIN: 0.05 NG/ML (ref 0–0.15)
RBC # BLD: 5.5 M/UL (ref 4–5.2)
RBC # BLD: NORMAL 10*6/UL
SEDIMENTATION RATE, ERYTHROCYTE: 21 MM/HR (ref 0–30)
SLIDE REVIEW: ABNORMAL
SODIUM BLD-SCNC: 130 MMOL/L (ref 136–145)
TOTAL PROTEIN: 6.7 G/DL (ref 6.4–8.2)
WBC # BLD: 10.8 K/UL (ref 4–11)

## 2021-10-05 PROCEDURE — 6360000002 HC RX W HCPCS: Performed by: INTERNAL MEDICINE

## 2021-10-05 PROCEDURE — 1200000000 HC SEMI PRIVATE

## 2021-10-05 PROCEDURE — 6370000000 HC RX 637 (ALT 250 FOR IP): Performed by: INTERNAL MEDICINE

## 2021-10-05 PROCEDURE — 85652 RBC SED RATE AUTOMATED: CPT

## 2021-10-05 PROCEDURE — 94640 AIRWAY INHALATION TREATMENT: CPT

## 2021-10-05 PROCEDURE — 80053 COMPREHEN METABOLIC PANEL: CPT

## 2021-10-05 PROCEDURE — 86140 C-REACTIVE PROTEIN: CPT

## 2021-10-05 PROCEDURE — 85025 COMPLETE CBC W/AUTO DIFF WBC: CPT

## 2021-10-05 PROCEDURE — 85379 FIBRIN DEGRADATION QUANT: CPT

## 2021-10-05 PROCEDURE — 36415 COLL VENOUS BLD VENIPUNCTURE: CPT

## 2021-10-05 PROCEDURE — 2580000003 HC RX 258: Performed by: INTERNAL MEDICINE

## 2021-10-05 PROCEDURE — 94680 O2 UPTK RST&XERS DIR SIMPLE: CPT

## 2021-10-05 PROCEDURE — 84145 PROCALCITONIN (PCT): CPT

## 2021-10-05 PROCEDURE — 82728 ASSAY OF FERRITIN: CPT

## 2021-10-05 RX ORDER — IPRATROPIUM BROMIDE AND ALBUTEROL SULFATE 2.5; .5 MG/3ML; MG/3ML
1 SOLUTION RESPIRATORY (INHALATION)
Status: DISCONTINUED | OUTPATIENT
Start: 2021-10-05 | End: 2021-10-05 | Stop reason: CLARIF

## 2021-10-05 RX ADMIN — AMLODIPINE BESYLATE 10 MG: 10 TABLET ORAL at 08:55

## 2021-10-05 RX ADMIN — INSULIN LISPRO 5 UNITS: 100 INJECTION, SOLUTION INTRAVENOUS; SUBCUTANEOUS at 20:00

## 2021-10-05 RX ADMIN — LOSARTAN POTASSIUM 50 MG: 50 TABLET, FILM COATED ORAL at 08:55

## 2021-10-05 RX ADMIN — FLUTICASONE PROPIONATE 1 SPRAY: 50 SPRAY, METERED NASAL at 08:54

## 2021-10-05 RX ADMIN — GUAIFENESIN 600 MG: 600 TABLET, EXTENDED RELEASE ORAL at 08:55

## 2021-10-05 RX ADMIN — DOCUSATE SODIUM 100 MG: 100 CAPSULE ORAL at 08:55

## 2021-10-05 RX ADMIN — INSULIN GLARGINE 12 UNITS: 100 INJECTION, SOLUTION SUBCUTANEOUS at 20:00

## 2021-10-05 RX ADMIN — INSULIN LISPRO 6 UNITS: 100 INJECTION, SOLUTION INTRAVENOUS; SUBCUTANEOUS at 18:27

## 2021-10-05 RX ADMIN — GUAIFENESIN 600 MG: 600 TABLET, EXTENDED RELEASE ORAL at 23:03

## 2021-10-05 RX ADMIN — ATORVASTATIN CALCIUM 40 MG: 40 TABLET, FILM COATED ORAL at 08:55

## 2021-10-05 RX ADMIN — ENOXAPARIN SODIUM 30 MG: 30 INJECTION SUBCUTANEOUS at 08:54

## 2021-10-05 RX ADMIN — SODIUM CHLORIDE, PRESERVATIVE FREE 10 ML: 5 INJECTION INTRAVENOUS at 10:58

## 2021-10-05 RX ADMIN — INSULIN LISPRO 4 UNITS: 100 INJECTION, SOLUTION INTRAVENOUS; SUBCUTANEOUS at 12:32

## 2021-10-05 RX ADMIN — ASPIRIN 81 MG CHEWABLE TABLET 81 MG: 81 TABLET CHEWABLE at 08:55

## 2021-10-05 RX ADMIN — SODIUM CHLORIDE, PRESERVATIVE FREE 10 ML: 5 INJECTION INTRAVENOUS at 21:00

## 2021-10-05 RX ADMIN — ENOXAPARIN SODIUM 30 MG: 30 INJECTION SUBCUTANEOUS at 21:00

## 2021-10-05 RX ADMIN — DEXAMETHASONE 6 MG: 6 TABLET ORAL at 08:54

## 2021-10-05 RX ADMIN — INSULIN LISPRO 2 UNITS: 100 INJECTION, SOLUTION INTRAVENOUS; SUBCUTANEOUS at 08:56

## 2021-10-05 ASSESSMENT — PAIN SCALES - GENERAL: PAINLEVEL_OUTOF10: 0

## 2021-10-05 NOTE — PLAN OF CARE
Problem: Falls - Risk of:  Goal: Will remain free from falls  Description: Will remain free from falls  10/5/2021 1031 by Sharan Zaldivar RN  Outcome: Ongoing  10/5/2021 0012 by Desiree Hightower RN  Outcome: Ongoing  Goal: Absence of physical injury  Description: Absence of physical injury  10/5/2021 1031 by Sharan Zaldivar RN  Outcome: Ongoing  10/5/2021 0012 by Desiree Hightower RN  Outcome: Ongoing

## 2021-10-05 NOTE — PLAN OF CARE
Problem: Falls - Risk of:  Goal: Will remain free from falls  Description: Will remain free from falls  10/5/2021 0012 by Enoch Estrella RN  Outcome: Ongoing  10/4/2021 1754 by Carolyn Escobedo RN  Outcome: Ongoing  Goal: Absence of physical injury  Description: Absence of physical injury  10/5/2021 0012 by Enoch Estrella RN  Outcome: Ongoing  10/4/2021 1754 by Carolyn Escobedo RN  Outcome: Ongoing     Problem: Airway Clearance - Ineffective  Goal: Achieve or maintain patent airway  10/5/2021 0012 by Enoch Estrella RN  Outcome: Ongoing  10/4/2021 1754 by Carolyn Escobedo RN  Outcome: Ongoing     Problem: Gas Exchange - Impaired  Goal: Absence of hypoxia  10/5/2021 0012 by Enoch Estrella RN  Outcome: Ongoing  10/4/2021 1754 by Carolyn Escobedo RN  Outcome: Ongoing  Goal: Promote optimal lung function  10/5/2021 0012 by Enoch Estrella RN  Outcome: Ongoing  10/4/2021 1754 by Carolyn Escobedo RN  Outcome: Ongoing     Problem: Breathing Pattern - Ineffective  Goal: Ability to achieve and maintain a regular respiratory rate  10/5/2021 0012 by Enoch Estrella RN  Outcome: Ongoing  10/4/2021 1754 by Carolyn Escobedo RN  Outcome: Ongoing     Problem:  Body Temperature -  Risk of, Imbalanced  Goal: Ability to maintain a body temperature within defined limits  10/5/2021 0012 by Enoch Estrella RN  Outcome: Ongoing  10/4/2021 1754 by Carolyn Escobedo RN  Outcome: Ongoing  Goal: Will regain or maintain usual level of consciousness  10/5/2021 0012 by Enoch Estrella RN  Outcome: Ongoing  10/4/2021 1754 by Carolyn Escobedo RN  Outcome: Ongoing  Goal: Complications related to the disease process, condition or treatment will be avoided or minimized  10/5/2021 0012 by Enoch Estrella RN  Outcome: Ongoing  10/4/2021 1754 by Carolyn Escobedo RN  Outcome: Ongoing     Problem: Isolation Precautions - Risk of Spread of Infection  Goal: Prevent transmission of infection  10/5/2021 0012 by Enoch Estrella RN  Outcome: Ongoing  10/4/2021 1754 by Jessica Crespo RN  Outcome: Ongoing     Problem: Nutrition Deficits  Goal: Optimize nutritional status  10/5/2021 0012 by Naina Ackerman RN  Outcome: Ongoing  10/4/2021 1754 by Jessica Crespo RN  Outcome: Ongoing     Problem: Isolation Precautions - Risk of Spread of Infection  Goal: Prevent transmission of infection  10/5/2021 0012 by Naina Ackerman RN  Outcome: Ongoing  10/4/2021 1754 by Jessica Crespo RN  Outcome: Ongoing     Problem: Risk for Fluid Volume Deficit  Goal: Maintain normal heart rhythm  10/5/2021 0012 by Naina Ackerman RN  Outcome: Ongoing  10/4/2021 1754 by Jessica Crespo RN  Outcome: Ongoing  Goal: Maintain absence of muscle cramping  10/5/2021 0012 by Naina Ackerman RN  Outcome: Ongoing  10/4/2021 1754 by Jessica Crespo RN  Outcome: Ongoing  Goal: Maintain normal serum potassium, sodium, calcium, phosphorus, and pH  10/5/2021 0012 by Naina Ackerman RN  Outcome: Ongoing  10/4/2021 1754 by Jessica Crespo RN  Outcome: Ongoing     Problem: Loneliness or Risk for Loneliness  Goal: Demonstrate positive use of time alone when socialization is not possible  10/5/2021 0012 by Naina Ackerman RN  Outcome: Ongoing  10/4/2021 1754 by Jessica Crespo RN  Outcome: Ongoing     Problem: Fatigue  Goal: Verbalize increase energy and improved vitality  10/5/2021 0012 by Naina Ackerman RN  Outcome: Ongoing  10/4/2021 1754 by Jessica Crespo RN  Outcome: Ongoing     Problem: Patient Education: Go to Patient Education Activity  Goal: Patient/Family Education  10/5/2021 0012 by Naina Ackerman RN  Outcome: Ongoing  10/4/2021 1754 by Jessica Crespo RN  Outcome: Ongoing

## 2021-10-05 NOTE — CARE COORDINATION
Yesenia received referral from RN-CM for possible Home Oxygen.     Review of RT's SATS testing reveals that patient does not qualify for insurance coverage of home oxygen at this time.     Thank you for the referral.  Electronically signed by Talita Cyr on 10/5/2021 at 1:00 PM Cell ph# [phone]

## 2021-10-05 NOTE — PROGRESS NOTES
HOME OXYGEN EVALUATION: Pt is 96% SpO2 on room air at rest and with ambulation on room air is 89% SpO2 .  Electronically signed by Hawa Rojas RCP on 10/5/2021 at 12:52 PM

## 2021-10-05 NOTE — PLAN OF CARE
Problem: Falls - Risk of:  Goal: Will remain free from falls  Description: Will remain free from falls  10/5/2021 1409 by Dayan Phillips RN  Outcome: Ongoing  10/5/2021 1031 by Dayan Phillips RN  Outcome: Ongoing  10/5/2021 0012 by Chung Gunn RN  Outcome: Ongoing  Goal: Absence of physical injury  Description: Absence of physical injury  10/5/2021 1409 by Dayan Phillips RN  Outcome: Ongoing  10/5/2021 1031 by Dayan Phillips RN  Outcome: Ongoing  10/5/2021 0012 by Chung Gunn RN  Outcome: Ongoing

## 2021-10-05 NOTE — PROGRESS NOTES
Hospitalist Progress Note      PCP: Cathleen Gonzalez MD    Date of Admission: 9/29/2021    Chief Complaint: wheezing    Hospital Course:      Subjective: states doesn't feel well enough to go home. Combivent started for wheezing. Medications:  Reviewed    Infusion Medications    dextrose      sodium chloride Stopped (09/30/21 1624)     Scheduled Medications    albuterol-ipratropium  1 puff Inhalation Q4H WA    insulin glargine  12 Units SubCUTAneous Nightly    docusate sodium  100 mg Oral Daily    insulin lispro  0-12 Units SubCUTAneous TID WC    insulin lispro  0-6 Units SubCUTAneous Nightly    enoxaparin  30 mg SubCUTAneous BID    guaiFENesin  600 mg Oral BID    fluticasone  1 spray Nasal Daily    hydroCHLOROthiazide  25 mg Oral Daily    amLODIPine  10 mg Oral Daily    losartan  50 mg Oral Daily    vitamin D  50,000 Units Oral Weekly    atorvastatin  40 mg Oral Daily    aspirin  81 mg Oral Daily    sodium chloride flush  5-40 mL IntraVENous 2 times per day    dexamethasone  6 mg Oral Daily     PRN Meds: glucose, dextrose, glucagon (rDNA), dextrose, sodium chloride flush, sodium chloride, ondansetron **OR** ondansetron, polyethylene glycol, acetaminophen **OR** acetaminophen      Intake/Output Summary (Last 24 hours) at 10/5/2021 1718  Last data filed at 10/4/2021 1752  Gross per 24 hour   Intake 250 ml   Output --   Net 250 ml       Exam:    /76   Pulse 61   Temp 98.3 °F (36.8 °C) (Oral)   Resp 16   Wt 201 lb 1 oz (91.2 kg)   LMP 06/01/2014   SpO2 94%   BMI 37.99 kg/m²     General appearance: No apparent distress, appears stated age and cooperative. HEENT: Pupils equal, round, and reactive to light. Conjunctivae/corneas clear. Neck: Supple, with full range of motion. No jugular venous distention. Trachea midline. Respiratory:  Normal respiratory effort.   Soft wheezing throughout  Cardiovascular: Regular rate and rhythm with normal S1/S2 without murmurs, rubs or gallops. Abdomen: Soft, non-tender, non-distended with normal bowel sounds. Musculoskeletal: No clubbing, cyanosis or edema bilaterally. Full range of motion without deformity. Skin: Skin color, texture, turgor normal.  No rashes or lesions. Neurologic:  Neurovascularly intact without any focal sensory/motor deficits. Cranial nerves: II-XII intact, grossly non-focal.  Psychiatric: Alert and oriented, thought content appropriate, normal insight  Capillary Refill: Brisk,< 3 seconds   Peripheral Pulses: +2 palpable, equal bilaterally       Labs:   Recent Labs     10/03/21  0804 10/04/21  0743 10/05/21  1013   WBC 10.8 10.8 10.8   HGB 14.0 13.9 14.3   HCT 45.0 43.3 45.2    305 348     Recent Labs     10/03/21  0804 10/04/21  0743 10/05/21  1013   * 137 130*   K 3.8 4.0 3.6   CL 96* 100 94*   CO2 22 24 24   BUN 16 16 17   CREATININE 0.7 0.7 0.8   CALCIUM 8.8 8.9 9.0     Recent Labs     10/03/21  0804 10/04/21  0743 10/05/21  1013   AST 15 10* 13*   ALT 15 13 12   BILITOT 0.3 0.3 0.3   ALKPHOS 58 55 57     No results for input(s): INR in the last 72 hours. No results for input(s): Rahel Fuchs in the last 72 hours. Urinalysis:      Lab Results   Component Value Date    NITRU Negative 01/29/2020    WBCUA 3 12/09/2019    BACTERIA 1+ 12/09/2019    RBCUA 3 12/09/2019    BLOODU Negative 01/29/2020    SPECGRAV 1.026 01/29/2020    GLUCOSEU Negative 01/29/2020       Radiology:  CT CHEST PULMONARY EMBOLISM W CONTRAST   Final Result   Somewhat limited evaluation for pulmonary embolism due to suboptimal contrast   bolus timing. No evidence of central pulmonary embolism. Multifocal airspace disease, given history of COVID, this is consistent with   COVID pneumonia.                  Assessment/Plan:    Active Hospital Problems    Diagnosis Date Noted    COVID-19 [U07.1] 09/29/2021       Covid-19 pneumonia  Acute hypoxic respiratory failure  -patient is mildly hypoxic with 88% on room air, will start Decadron  -IV Remdesivir per family request reasonable given hypoxia; complete 5 days of therapy  -Continue symptomatic management for now  --inflammatory markers improving  - Combivent started for wheezine     Hypertension  -Continue home blood pressure medications     Diabetes mellitus  -Continue insulin sliding scale  --Lantus  --hypoglycemia protocol  --POCT glucose checks  --carb control diet     Morbid obesity with BMI of 38  -Complicating medical problem and placing patient at high risk of morbidity and mortality    DVT Prophylaxis: Lovenox  Diet: ADULT DIET;  Regular; 3 carb choices (45 gm/meal)  Code Status: Full Code    PT/OT Eval Status: n/a    Dispo - anticipate d/c tomorrow    Adamaris Schulte MD

## 2021-10-05 NOTE — CARE COORDINATION
10/05/21 1516   IMM Letter   IMM Letter given to Patient/Family/Significant other/Guardian/POA/by: Zaki Oakes RN   IMM Letter date given: 10/05/21   IMM Letter time given: 1507     Zaki Oakse RN, BSN,   [phone]  Electronically signed by Zaki Oakes RN on 10/5/2021 at 3:16 PM

## 2021-10-06 VITALS
DIASTOLIC BLOOD PRESSURE: 74 MMHG | HEART RATE: 60 BPM | OXYGEN SATURATION: 93 % | WEIGHT: 201.06 LBS | TEMPERATURE: 97.7 F | SYSTOLIC BLOOD PRESSURE: 117 MMHG | BODY MASS INDEX: 37.99 KG/M2 | RESPIRATION RATE: 16 BRPM

## 2021-10-06 LAB
A/G RATIO: 0.9 (ref 1.1–2.2)
ALBUMIN SERPL-MCNC: 3.2 G/DL (ref 3.4–5)
ALP BLD-CCNC: 71 U/L (ref 40–129)
ALT SERPL-CCNC: 14 U/L (ref 10–40)
ANION GAP SERPL CALCULATED.3IONS-SCNC: 18 MMOL/L (ref 3–16)
AST SERPL-CCNC: 14 U/L (ref 15–37)
ATYPICAL LYMPHOCYTE RELATIVE PERCENT: 1 % (ref 0–6)
BANDED NEUTROPHILS RELATIVE PERCENT: 2 % (ref 0–7)
BASOPHILS ABSOLUTE: 0 K/UL (ref 0–0.2)
BASOPHILS RELATIVE PERCENT: 0 %
BILIRUB SERPL-MCNC: 0.3 MG/DL (ref 0–1)
BUN BLDV-MCNC: 20 MG/DL (ref 7–20)
C-REACTIVE PROTEIN: 7.4 MG/L (ref 0–5.1)
CALCIUM SERPL-MCNC: 9.1 MG/DL (ref 8.3–10.6)
CHLORIDE BLD-SCNC: 100 MMOL/L (ref 99–110)
CO2: 18 MMOL/L (ref 21–32)
CREAT SERPL-MCNC: 0.8 MG/DL (ref 0.6–1.1)
D DIMER: <200 NG/ML DDU (ref 0–229)
EOSINOPHILS ABSOLUTE: 0 K/UL (ref 0–0.6)
EOSINOPHILS RELATIVE PERCENT: 0 %
FERRITIN: 1068 NG/ML (ref 15–150)
GFR AFRICAN AMERICAN: >60
GFR NON-AFRICAN AMERICAN: >60
GLOBULIN: 3.6 G/DL
GLUCOSE BLD-MCNC: 179 MG/DL (ref 70–99)
GLUCOSE BLD-MCNC: 210 MG/DL (ref 70–99)
GLUCOSE BLD-MCNC: 252 MG/DL (ref 70–99)
HCT VFR BLD CALC: 44.5 % (ref 36–48)
HEMOGLOBIN: 14.2 G/DL (ref 12–16)
LYMPHOCYTES ABSOLUTE: 1.8 K/UL (ref 1–5.1)
LYMPHOCYTES RELATIVE PERCENT: 12 %
MCH RBC QN AUTO: 26.1 PG (ref 26–34)
MCHC RBC AUTO-ENTMCNC: 31.9 G/DL (ref 31–36)
MCV RBC AUTO: 81.9 FL (ref 80–100)
MONOCYTES ABSOLUTE: 1.3 K/UL (ref 0–1.3)
MONOCYTES RELATIVE PERCENT: 9 %
NEUTROPHILS ABSOLUTE: 10.8 K/UL (ref 1.7–7.7)
NEUTROPHILS RELATIVE PERCENT: 76 %
PDW BLD-RTO: 15.4 % (ref 12.4–15.4)
PERFORMED ON: ABNORMAL
PERFORMED ON: ABNORMAL
PLATELET # BLD: 374 K/UL (ref 135–450)
PLATELET SLIDE REVIEW: ADEQUATE
PMV BLD AUTO: 9.4 FL (ref 5–10.5)
POTASSIUM REFLEX MAGNESIUM: 4.6 MMOL/L (ref 3.5–5.1)
PROCALCITONIN: 0.03 NG/ML (ref 0–0.15)
RBC # BLD: 5.43 M/UL (ref 4–5.2)
REASON FOR REJECTION: NORMAL
REJECTED TEST: NORMAL
SLIDE REVIEW: ABNORMAL
SODIUM BLD-SCNC: 136 MMOL/L (ref 136–145)
TOTAL PROTEIN: 6.8 G/DL (ref 6.4–8.2)
WBC # BLD: 13.9 K/UL (ref 4–11)

## 2021-10-06 PROCEDURE — 94640 AIRWAY INHALATION TREATMENT: CPT

## 2021-10-06 PROCEDURE — 80053 COMPREHEN METABOLIC PANEL: CPT

## 2021-10-06 PROCEDURE — 94760 N-INVAS EAR/PLS OXIMETRY 1: CPT

## 2021-10-06 PROCEDURE — 85379 FIBRIN DEGRADATION QUANT: CPT

## 2021-10-06 PROCEDURE — 2580000003 HC RX 258: Performed by: INTERNAL MEDICINE

## 2021-10-06 PROCEDURE — 36415 COLL VENOUS BLD VENIPUNCTURE: CPT

## 2021-10-06 PROCEDURE — 85025 COMPLETE CBC W/AUTO DIFF WBC: CPT

## 2021-10-06 PROCEDURE — 6360000002 HC RX W HCPCS: Performed by: INTERNAL MEDICINE

## 2021-10-06 PROCEDURE — 82728 ASSAY OF FERRITIN: CPT

## 2021-10-06 PROCEDURE — 84145 PROCALCITONIN (PCT): CPT

## 2021-10-06 PROCEDURE — 6370000000 HC RX 637 (ALT 250 FOR IP): Performed by: INTERNAL MEDICINE

## 2021-10-06 PROCEDURE — 86140 C-REACTIVE PROTEIN: CPT

## 2021-10-06 RX ORDER — DEXAMETHASONE 6 MG/1
6 TABLET ORAL DAILY
Qty: 3 TABLET | Refills: 0 | Status: SHIPPED | OUTPATIENT
Start: 2021-10-07 | End: 2021-10-10

## 2021-10-06 RX ORDER — GUAIFENESIN 600 MG/1
600 TABLET, EXTENDED RELEASE ORAL 2 TIMES DAILY
Qty: 20 TABLET | Refills: 0 | Status: SHIPPED | OUTPATIENT
Start: 2021-10-06 | End: 2021-10-16

## 2021-10-06 RX ADMIN — INSULIN LISPRO 4 UNITS: 100 INJECTION, SOLUTION INTRAVENOUS; SUBCUTANEOUS at 08:26

## 2021-10-06 RX ADMIN — AMLODIPINE BESYLATE 10 MG: 10 TABLET ORAL at 08:25

## 2021-10-06 RX ADMIN — ATORVASTATIN CALCIUM 40 MG: 40 TABLET, FILM COATED ORAL at 08:25

## 2021-10-06 RX ADMIN — LOSARTAN POTASSIUM 50 MG: 50 TABLET, FILM COATED ORAL at 08:25

## 2021-10-06 RX ADMIN — SODIUM CHLORIDE, PRESERVATIVE FREE 10 ML: 5 INJECTION INTRAVENOUS at 08:26

## 2021-10-06 RX ADMIN — DOCUSATE SODIUM 100 MG: 100 CAPSULE ORAL at 08:25

## 2021-10-06 RX ADMIN — ASPIRIN 81 MG CHEWABLE TABLET 81 MG: 81 TABLET CHEWABLE at 08:25

## 2021-10-06 RX ADMIN — DEXAMETHASONE 6 MG: 6 TABLET ORAL at 08:24

## 2021-10-06 RX ADMIN — GUAIFENESIN 600 MG: 600 TABLET, EXTENDED RELEASE ORAL at 08:25

## 2021-10-06 RX ADMIN — ENOXAPARIN SODIUM 30 MG: 30 INJECTION SUBCUTANEOUS at 08:25

## 2021-10-06 RX ADMIN — FLUTICASONE PROPIONATE 1 SPRAY: 50 SPRAY, METERED NASAL at 08:26

## 2021-10-06 ASSESSMENT — PAIN SCALES - GENERAL: PAINLEVEL_OUTOF10: 0

## 2021-10-06 NOTE — PROGRESS NOTES
CLINICAL PHARMACY NOTE: MEDS TO BEDS    Total # of Prescriptions Filled: 2   The following medications were delivered to the patient:  Current Discharge Medication List      START taking these medications    Details   albuterol-ipratropium (COMBIVENT RESPIMAT)  MCG/ACT AERS inhaler Inhale 1 puff into the lungs every 6 hours as needed for Wheezing  Qty: 1 each, Refills: 0      dexamethasone (DECADRON) 6 MG tablet Take 1 tablet by mouth daily for 3 days  Qty: 3 tablet, Refills: 0      guaiFENesin (MUCINEX) 600 MG extended release tablet Take 1 tablet by mouth 2 times daily for 10 days  Qty: 20 tablet, Refills: 0         · no mucinex dispensed  ·     Additional Documentation:

## 2021-10-06 NOTE — PROGRESS NOTES
Pt discharged to home with family. All belongings gathered. Pt given AVS, all information explained with time for questions.  All lines removed, no complications

## 2021-10-06 NOTE — PLAN OF CARE
Problem: Falls - Risk of:  Goal: Will remain free from falls  Description: Will remain free from falls  Outcome: Completed  Goal: Absence of physical injury  Description: Absence of physical injury  Outcome: Completed     Problem: Airway Clearance - Ineffective  Goal: Achieve or maintain patent airway  Outcome: Completed     Problem: Gas Exchange - Impaired  Goal: Absence of hypoxia  Outcome: Completed  Goal: Promote optimal lung function  Outcome: Completed     Problem: Breathing Pattern - Ineffective  Goal: Ability to achieve and maintain a regular respiratory rate  Outcome: Completed     Problem:  Body Temperature -  Risk of, Imbalanced  Goal: Ability to maintain a body temperature within defined limits  Outcome: Completed  Goal: Will regain or maintain usual level of consciousness  Outcome: Completed  Goal: Complications related to the disease process, condition or treatment will be avoided or minimized  Outcome: Completed     Problem: Isolation Precautions - Risk of Spread of Infection  Goal: Prevent transmission of infection  Outcome: Completed     Problem: Nutrition Deficits  Goal: Optimize nutritional status  Outcome: Completed     Problem: Risk for Fluid Volume Deficit  Goal: Maintain normal heart rhythm  Outcome: Completed  Goal: Maintain absence of muscle cramping  Outcome: Completed  Goal: Maintain normal serum potassium, sodium, calcium, phosphorus, and pH  Outcome: Completed     Problem: Loneliness or Risk for Loneliness  Goal: Demonstrate positive use of time alone when socialization is not possible  Outcome: Completed     Problem: Fatigue  Goal: Verbalize increase energy and improved vitality  Outcome: Completed     Problem: Patient Education: Go to Patient Education Activity  Goal: Patient/Family Education  Outcome: Completed

## 2021-10-06 NOTE — DISCHARGE SUMMARY
Hospital Medicine Discharge Summary    Patient ID: Tricia Norris      Patient's PCP: Dara Casey MD    Admit Date: 9/29/2021     Discharge Date: 10/6/2021      Admitting Physician: Cece Ovalles MD     Discharge Physician: Ortiz Carpenter MD     Discharge Diagnoses: Active Hospital Problems    Diagnosis Date Noted    COVID-19 [U07.1] 09/29/2021       The patient was seen and examined on day of discharge and this discharge summary is in conjunction with any daily progress note from day of discharge. Hospital Course:        Covid-19 pneumonia  Acute hypoxic respiratory failure  -patient is mildly hypoxic with 88% on room air, will start Decadron  -IV Remdesivir per family request reasonable given hypoxia; complete 5 days of therapy  -Continue symptomatic management for now  --inflammatory markers improving  - Combivent started for wheezing   - recommend PFT's outpatient to r/o COPD     Hypertension  -Continue home blood pressure medications     Diabetes mellitus  -Continue insulin sliding scale  --Lantus  --hypoglycemia protocol  --POCT glucose checks  --carb control diet     Morbid obesity with BMI of 38  -Complicating medical problem and placing patient at high risk of morbidity and mortality      Exam:     /74   Pulse 60   Temp 97.7 °F (36.5 °C) (Oral)   Resp 16   Wt 201 lb 1 oz (91.2 kg)   LMP 06/01/2014   SpO2 93%   BMI 37.99 kg/m²     General appearance: No apparent distress, appears stated age and cooperative. HEENT: Pupils equal, round, and reactive to light. Conjunctivae/corneas clear. Neck: Supple, with full range of motion. No jugular venous distention. Trachea midline. Respiratory:  Normal respiratory effort. Faint wheezing throughout  Cardiovascular: Regular rate and rhythm with normal S1/S2 without murmurs, rubs or gallops. Abdomen: Soft, non-tender, non-distended with normal bowel sounds. Musculoskelatal: No clubbing, cyanosis or edema bilaterally.   Full range of motion without deformity. Skin: Skin color, texture, turgor normal.  No rashes or lesions. Neurologic:  Neurovascularly intact without any focal sensory/motor deficits. Cranial nerves: II-XII intact, grossly non-focal.  Psychiatric: Alert and oriented, thought content appropriate, normal insight      Consults:     IP CONSULT TO HOSPITALIST    Significant Diagnostic Studies:          Radiology:  CT CHEST PULMONARY EMBOLISM W CONTRAST   Final Result   Somewhat limited evaluation for pulmonary embolism due to suboptimal contrast   bolus timing. No evidence of central pulmonary embolism.       Multifocal airspace disease, given history of COVID, this is consistent with   COVID pneumonia.             PCP/SNF to follow up: Recommend obtaining PFT's outpatient to r/o COPD    Disposition:  Home    Condition on D/C:  Stable     Discharge Instructions/Follow-up:  F/u with PCP within 1 week    Code Status:  Prior     Activity: activity as tolerated    Diet: diabetic diet    Labs:  For convenience and continuity at follow-up the following most recent labs are provided:      CBC:    Lab Results   Component Value Date    WBC 13.9 10/06/2021    HGB 14.2 10/06/2021    HCT 44.5 10/06/2021     10/06/2021       Renal:    Lab Results   Component Value Date     10/06/2021    K 4.6 10/06/2021     10/06/2021    CO2 18 10/06/2021    BUN 20 10/06/2021    CREATININE 0.8 10/06/2021    CALCIUM 9.1 10/06/2021    PHOS 2.7 04/05/2019       Discharge Medications:     Discharge Medication List as of 10/6/2021 12:27 PM           Details   albuterol-ipratropium (COMBIVENT RESPIMAT)  MCG/ACT AERS inhaler Inhale 1 puff into the lungs every 6 hours as needed for Wheezing, Disp-1 each, R-0Normal      dexamethasone (DECADRON) 6 MG tablet Take 1 tablet by mouth daily for 3 days, Disp-3 tablet, R-0Normal      guaiFENesin (MUCINEX) 600 MG extended release tablet Take 1 tablet by mouth 2 times daily for 10 days, Disp-20 tablet, R-0Normal              Details   aspirin 81 MG chewable tablet Take 81 mg by mouth dailyHistorical Med      hydroCHLOROthiazide (HYDRODIURIL) 25 MG tablet Take 1 tablet by mouth daily Cancel hydrochlorothiazide 12.5 mg, Disp-30 tablet, R-2Normal      amLODIPine (NORVASC) 10 MG tablet Take 1 tablet by mouth daily, Disp-30 tablet, R-5Normal      losartan (COZAAR) 50 MG tablet Take 1 tablet by mouth daily, Disp-30 tablet, R-5Normal      vitamin D (ERGOCALCIFEROL) 1.25 MG (71788 UT) CAPS capsule Take 1 capsule by mouth once a week, Disp-4 capsule, R-2Normal      atorvastatin (LIPITOR) 40 MG tablet Take 1 tablet by mouth daily, Disp-30 tablet, R-5Normal      metFORMIN (GLUCOPHAGE) 500 MG tablet Take 1 tablet by mouth 2 times daily (with meals), Disp-60 tablet, R-5Normal      fluticasone (FLONASE) 50 MCG/ACT nasal spray 1 spray by Nasal route daily, Disp-1 Bottle, R-3Normal      Blood Pressure KIT Disp-1 kit,R-0, NormalCk blood pressure once a week      acetaminophen (TYLENOL) 500 MG tablet Take 1,000 mg by mouth every 4 hours as needed for Pain             Time Spent on discharge is more than 45 minutes in the examination, evaluation, counseling and review of medications and discharge plan. Signed: Marlo Galo MD   10/6/2021      Thank you Toney Salinas MD for the opportunity to be involved in this patient's care. If you have any questions or concerns please feel free to contact me at [phone].

## 2021-10-06 NOTE — CARE COORDINATION
CASE MANAGEMENT DISCHARGE SUMMARY:    DISCHARGE DATE: 10/06/21     DISCHARGED TO HOME     TRANSPORTATION: Son to transport      PREFERRED PHARMACY: Ally Evans PARVEZ Oneill, RONAK Case Management  [phone]  Electronically signed by PARVEZ Kirlkand, LSW on 10/6/2021 at 11:40 AM

## 2021-10-07 ENCOUNTER — CARE COORDINATION (OUTPATIENT)
Dept: CASE MANAGEMENT | Age: 61
End: 2021-10-07

## 2021-10-07 DIAGNOSIS — U07.1 COVID-19: Primary | ICD-10-CM

## 2021-10-07 PROCEDURE — 1111F DSCHRG MED/CURRENT MED MERGE: CPT | Performed by: FAMILY MEDICINE

## 2021-10-07 NOTE — CARE COORDINATION
[facility] Transitions Initial Follow Up Call    Call within 2 business days of discharge: Yes    Patient: Lazarus Dawes Patient : 1960   MRN: [id number]  Reason for Admission: PNA d/t COVID+  Discharge Date: 10/6/21 RARS: Readmission Risk Score: 13      Last Discharge [facility]       Complaint Diagnosis Description Type Department Provider    21 Positive For Covid-19 Hypoxia . .. ED to Hosp-Admission (Discharged) (ADMITTED) Heidi Jewell MD; Katja Lopes. .. Spoke with: Becky: PHYSICIANS [facility]    Non-face-to-face services provided:  Obtained and reviewed discharge summary and/or continuity of care documents    Transitions of Care Initial Call    Was this an external facility discharge? No Discharge Facility: NA    Challenges to be reviewed by the provider   Additional needs identified to be addressed with provider: No  none             Method of communication with provider : phone      Advance Care Planning:   Does patient have an Advance Directive: reviewed and current. Advance Care Planning   Healthcare Decision Maker:    Primary Decision Maker: Erin Cunningham Child - [phone]    Primary Decision Maker: Emmy Washington - Child - [phone]    Primary Decision Maker: Enrrique Keenan Child - [phone]    Was this a readmission? No  Patient stated reason for admission: SOB  Patients top risk factors for readmission: medical condition-PNA d/t COVID+    Care Transition Nurse (CTN) contacted the patient by telephone to perform post hospital discharge assessment. Verified name and  with patient as identifiers. Provided introduction to self, and explanation of the CTN role. CTN reviewed discharge instructions, medical action plan and red flags with patient who verbalized understanding. Patient given an opportunity to ask questions and does not have any further questions or concerns at this time.  Were discharge instructions available to after hours. LPN CC provided contact information. Plan for follow-up call in 5-7 days based on severity of symptoms and risk factors. LPN CC called Zoe who will transfer RX from [facility] [place] to [place] [id number]. Patient notified to take insurance cards.    Abbey Donahue  [facility]  [phone]    Care Transitions 24 Hour Call    Do you have any ongoing symptoms?: No  Do you have a copy of your discharge instructions?: Yes  Do you have all of your prescriptions and are they filled?: No  Have you been contacted by a [address]?: No  Have you scheduled your follow up appointment?: No  Were you discharged with any Home Care or Post Acute Services: No  Do you feel like you have everything you need to keep you well at home?: Yes  Care Transitions Interventions         Follow Up  Future Appointments   Date Time Provider Boris Mathur   11/11/2021 10:20 AM Jud [facility]lszewski,  [facility]

## 2021-10-12 ENCOUNTER — TELEPHONE (OUTPATIENT)
Dept: PRIMARY CARE CLINIC | Age: 61
End: 2021-10-12

## 2021-10-12 DIAGNOSIS — E11.9 TYPE 2 DIABETES MELLITUS WITHOUT COMPLICATION, WITHOUT LONG-TERM CURRENT USE OF INSULIN (HCC): Primary | ICD-10-CM

## 2021-10-12 RX ORDER — BLOOD SUGAR DIAGNOSTIC
1 STRIP MISCELLANEOUS DAILY
Qty: 100 EACH | Refills: 1 | Status: SHIPPED | OUTPATIENT
Start: 2021-10-12 | End: 2021-11-10 | Stop reason: SDUPTHER

## 2021-10-12 RX ORDER — LANCETS 30 GAUGE
1 EACH MISCELLANEOUS DAILY
Qty: 100 EACH | Refills: 1 | Status: SHIPPED | OUTPATIENT
Start: 2021-10-12

## 2021-10-12 RX ORDER — BLOOD-GLUCOSE METER
EACH MISCELLANEOUS
Qty: 1 KIT | Refills: 0 | Status: SHIPPED | OUTPATIENT
Start: 2021-10-12 | End: 2021-11-04

## 2021-10-12 NOTE — TELEPHONE ENCOUNTER
Patient would like insulin blood sugar glucose monitor machine order; please send to [address] [place], [place] [address]k St

## 2021-10-13 NOTE — PROGRESS NOTES
Physician Progress Note      Philip Davenport  CSN #:                  [id number]  :                       1960  ADMIT DATE:       2021 11:13 AM  DISCH DATE:        10/6/2021 1:56 PM  RESPONDING  PROVIDER #:        Darya Chiu MD          QUERY TEXT:    Patient admitted with COVID PNA. Noted documentation of acute respiratory   failure in H&P and Progress Notes. In order to support the diagnosis of acute   respiratory failure, please include additional clinical indicators in your   documentation. Or please document if the diagnosis of acute respiratory   failure has been ruled out after further study. The medical record reflects the following:  Risk Factors: COVID PNA  Clinical Indicators: 88% on room air - was on [facility] - currently on room air  Treatment: O2, Decadron IV    Acute Respiratory Failure Clinical Indicators per  MS-DRG Training Guide and   Quick Reference Guide:  pO2 < 60 mmHg or SpO2 (pulse oximetry) < 91% breathing room air  pCO2 > 50 and pH < 7.35  P/F ratio (pO2 / FIO2) < 300  pO2 decrease or pCO2 increase by 10 mmHg from baseline (if known)  Supplemental oxygen of 40% or more  Presence of respiratory distress, tachypnea, dyspnea, shortness of breath,   wheezing  Unable to speak in complete sentences  Use of accessory muscles to breathe  Extreme anxiety and feeling of impending doom  Tripod position  Confusion/altered mental status/obtunded  Options provided:  -- Acute Respiratory Failure ruled out after study  -- Acute Respiratory Failure as evidenced by, Please document evidence.   -- Other - I will add my own diagnosis  -- Disagree - Not applicable / Not valid  -- Disagree - Clinically unable to determine / Unknown  -- Refer to Clinical Documentation Reviewer    PROVIDER RESPONSE TEXT:    This patient is in acute respiratory failure as evidenced by 88% on RA in the   ED    Query created by: Diane Isaac on 10/5/2021 2:02 PM      Electronically signed by: Kenny Vigil MD 10/13/2021 9:16 AM

## 2021-10-19 ENCOUNTER — CARE COORDINATION (OUTPATIENT)
Dept: CASE MANAGEMENT | Age: 61
End: 2021-10-19

## 2021-10-19 NOTE — CARE COORDINATION
[facility] Transitions Follow Up Call    10/19/2021    Patient: Deepa Ya  Patient : 1960   MRN: <[id number]>  Reason for Admission: PNA d/t COVID+   Discharge Date: 10/6/21 RARS: Readmission Risk Score: 13    Spoke with: Deepa Ya who reports that she is doing a lot better. Patient states that the only ongoing symptom is that she is still coughing up phlegm. Patient denies cp, sob, dizziness, headache, n/v, diarrhea, abdominal pains, fever, or chills. Patient report that appetite and fluid intake is good and denies any problems with bowel or bladder. Patient reports that she has not scheduled HFU yet because she was in quarantine and want to go and get tested first before going to the doctor. Advised that it is a good idea to do FU with doctor after a hospital stay and we recommend within 7 days, patient verbalized understanding and stated that she will. Denies any needs at present time. Patient instructed to continue to monitor for any of the above s/s, reporting any that may present to MD immediately. Reminded of COVID 19 precautions. Agreeable to f/u calls. Educated on the use of urgent care or physicians 24 hr access line if assistance is needed after hours. [facility] provided contact information for future needs. Care Transitions Subsequent and Final Call    Subsequent and Final Calls  Care Transitions Interventions  Other Interventions:            Follow Up  Future Appointments   Date Time Provider Boris Mathur   2021 10:20 AM Romeo Gifford MD [facility] GYN EVELYNE Schultz LPN

## 2021-10-27 ENCOUNTER — CARE COORDINATION (OUTPATIENT)
Dept: CASE MANAGEMENT | Age: 61
End: 2021-10-27

## 2021-10-27 NOTE — CARE COORDINATION
[facility] Transitions Follow Up Call    10/27/2021    Patient: Remy Weaver  Patient : 1960   MRN: [id number]  Reason for Admission: PNA d/t COVID+  Discharge Date: 10/6/21 RARS: Readmission Risk Score: 13         Spoke with: Alyssa Segura Transitions Follow Up Call    Needs to be reviewed by the provider   Additional needs identified to be addressed with provider: No  none             Method of communication with provider : phone      Care Transition Nurse (CTN) contacted the patient by telephone to follow up after admission. Verified name and  with patient as identifiers. Addressed changes since last contact: none  Discussed follow-up appointments. If no appointment was previously scheduled, appointment scheduling offered: Yes. Is follow up appointment scheduled within 7 days of discharge? Yes. Advance Care Planning:   Does patient have an Advance Directive: reviewed and current. Advance Care Planning   Healthcare Decision Maker:    Primary Decision Maker: Bernard Wolf Child - [phone]    Primary Decision Maker: Joy Sommer - Child - [phone]    Primary Decision Maker: Tressa Dillard Child - [phone]    CTN reviewed discharge instructions, medical action plan and red flags with patient and discussed any barriers to care and/or understanding of plan of care after discharge. Discussed appropriate site of care based on symptoms and resources available to patient including: PCP. The patient agrees to contact the PCP office for questions related to their healthcare. Patients top risk factors for readmission: medical condition-PNA d/t COVID+    Patient verified  and was pleasant and agreeable to transition calls. States she is fine. Denies any COVID related symptoms except some lingering cough. States taste and smell have returned. Appetite good. Denies problems with bowels or bladder. Has not had f/u with PCP yet. States she will schedule soon.  Patient plans to get second dose of the vaccine in the next couple of weeks. LPN CC educated patient that she should be symptom free for about 2 weeks before receiving vaccine. Patient verbalized understanding. Denies medication changes. Denies any acute needs at present time. Agreeable to f/u calls. Educated on the use of urgent care or physicians 24 hr access line if assistance is needed after hours. CTN provided contact information for future needs. Plan for follow-up call in 7-10 days based on severity of symptoms and risk factors. González Caro LPN [address] [place] [address]  Care Transitions  [phone]    Care Transitions Subsequent and Final Call    Subsequent and Final Calls  Care Transitions Interventions  Other Interventions:            Follow Up  Future Appointments   Date Time Provider Boris Mathur   11/11/2021 10:20 AM MD ROSSANA Briscoe GYN [facility]       González Caro LPN

## 2021-11-03 ENCOUNTER — CARE COORDINATION (OUTPATIENT)
Dept: CASE MANAGEMENT | Age: 61
End: 2021-11-03

## 2021-11-03 NOTE — CARE COORDINATION
[facility] Transitions Follow Up Call    11/3/2021    Patient: Carlos Manuel Ceballos  Patient : 1960   MRN: <[id number]>  Reason for Admission: PNA d/t COVID+   Discharge Date: 10/6/21 RARS: Readmission Risk Score: 13    Spoke with: Carlos Manuel Ceballos who reports that the only ongoing symptoms for Covid is coughing up mucus. Patient denies denies cp, sob, headache, n/v, diarrhea, abdominal pains, fever, or chills. Patient report that appetite and fluid intake is good and denies any problems with bowel or bladder. Patient reports that she only checks BS a couple times a week. Checked during call and . /108. Patient reports that she stopped taking all BP medication except for HCTZ. She stated that when she came home from the hospital and taking all her medications is was making her dizziness and sick feeling. Advise patient on the importance of contacting her doctor when a medication is causing ill effects. Writer offered to do a 3 way call to schedule appointment with doctor for HFU and medication review. Patient agreeable. Appointment scheduled 11/10/21 at 10:15 am. Advised patient in the meantime check BP 2 times a day and BS once a day and keep a log. Take that log with you to your doctor's appointment for the doctor to see. Patient verbalized understanding. Denies any needs at this time. Patient instructed to continue to monitor s/s, reporting any that may present to MD immediately for early intervention. Reminded of COVID 19 precautions. Agreeable to f/u calls. Care Transitions Subsequent and Final Call    Subsequent and Final Calls  Care Transitions Interventions  Other Interventions:            Follow Up  Future Appointments   Date Time Provider Boris Mathur   2021 10:20 AM Janice Londono MD [facility] GYN EVELYNE Buchanan LPN

## 2021-11-04 DIAGNOSIS — E11.9 TYPE 2 DIABETES MELLITUS WITHOUT COMPLICATION, WITHOUT LONG-TERM CURRENT USE OF INSULIN (HCC): ICD-10-CM

## 2021-11-04 RX ORDER — TIZANIDINE 4 MG/1
TABLET ORAL
Qty: 40 TABLET | Refills: 0 | Status: SHIPPED | OUTPATIENT
Start: 2021-11-04

## 2021-11-04 RX ORDER — BLOOD-GLUCOSE METER
EACH MISCELLANEOUS
Qty: 1 KIT | Refills: 0 | Status: SHIPPED | OUTPATIENT
Start: 2021-11-04

## 2021-11-04 NOTE — TELEPHONE ENCOUNTER
Tried to refuse the Tizanidine but both of them will be refused. I called the pharmacy and requested that they refuse and discontinue medication    Medication:   Requested Prescriptions     Pending Prescriptions Disp Refills    tiZANidine (ZANAFLEX) 4 MG tablet [Pharmacy Med Name: tiZANidine HCL 4MG TABLET] 40 tablet 0     Sig: TAKE ONE TABLET BY MOUTH FOUR TIMES A DAY AS NEEDED FOR SPASM    Blood Glucose Monitoring Suppl (ACCU-CHEK GUIDE ME) w/Device KIT [Pharmacy Med Name: Hailey Solomon ME GLUCOSE MTR]       Sig: CHECK BLOOD SUGAR DAILY        Last Filled:      Patient Phone Number: [phone] (home)     Last appt: 8/16/2021   Next appt: 11/10/2021    Last [place]AR[place]:   RX Monitoring 3/27/2018   Attestation The Prescription Monitoring Report for this patient was reviewed today. Periodic Controlled Substance Monitoring Possible medication side effects, risk of tolerance/dependence & alternative treatments discussed. Chronic Pain > 80 MEDD Treatment objectives documented - patient is progressing appropriately.

## 2021-11-10 ENCOUNTER — OFFICE VISIT (OUTPATIENT)
Dept: PRIMARY CARE CLINIC | Age: 61
End: 2021-11-10
Payer: MEDICARE

## 2021-11-10 VITALS
TEMPERATURE: 97.5 F | SYSTOLIC BLOOD PRESSURE: 135 MMHG | DIASTOLIC BLOOD PRESSURE: 93 MMHG | HEART RATE: 85 BPM | WEIGHT: 197 LBS | HEIGHT: 61 IN | OXYGEN SATURATION: 95 % | BODY MASS INDEX: 37.19 KG/M2

## 2021-11-10 DIAGNOSIS — I10 ESSENTIAL HYPERTENSION: ICD-10-CM

## 2021-11-10 DIAGNOSIS — Z12.11 SCREEN FOR COLON CANCER: ICD-10-CM

## 2021-11-10 DIAGNOSIS — E78.2 MIXED HYPERLIPIDEMIA: ICD-10-CM

## 2021-11-10 DIAGNOSIS — I10 ESSENTIAL HYPERTENSION: Primary | ICD-10-CM

## 2021-11-10 DIAGNOSIS — E55.9 VITAMIN D DEFICIENCY: ICD-10-CM

## 2021-11-10 DIAGNOSIS — E11.9 TYPE 2 DIABETES MELLITUS WITHOUT COMPLICATION, WITHOUT LONG-TERM CURRENT USE OF INSULIN (HCC): ICD-10-CM

## 2021-11-10 PROCEDURE — 2022F DILAT RTA XM EVC RTNOPTHY: CPT | Performed by: FAMILY MEDICINE

## 2021-11-10 PROCEDURE — 3017F COLORECTAL CA SCREEN DOC REV: CPT | Performed by: FAMILY MEDICINE

## 2021-11-10 PROCEDURE — 99214 OFFICE O/P EST MOD 30 MIN: CPT | Performed by: FAMILY MEDICINE

## 2021-11-10 PROCEDURE — 3044F HG A1C LEVEL LT 7.0%: CPT | Performed by: FAMILY MEDICINE

## 2021-11-10 PROCEDURE — G8484 FLU IMMUNIZE NO ADMIN: HCPCS | Performed by: FAMILY MEDICINE

## 2021-11-10 PROCEDURE — 1036F TOBACCO NON-USER: CPT | Performed by: FAMILY MEDICINE

## 2021-11-10 PROCEDURE — 83036 HEMOGLOBIN GLYCOSYLATED A1C: CPT | Performed by: FAMILY MEDICINE

## 2021-11-10 PROCEDURE — G8427 DOCREV CUR MEDS BY ELIG CLIN: HCPCS | Performed by: FAMILY MEDICINE

## 2021-11-10 PROCEDURE — G8417 CALC BMI ABV UP PARAM F/U: HCPCS | Performed by: FAMILY MEDICINE

## 2021-11-10 RX ORDER — ERGOCALCIFEROL 1.25 MG/1
50000 CAPSULE ORAL WEEKLY
Qty: 4 CAPSULE | Refills: 2 | Status: SHIPPED | OUTPATIENT
Start: 2021-11-10

## 2021-11-10 RX ORDER — ATORVASTATIN CALCIUM 40 MG/1
40 TABLET, FILM COATED ORAL DAILY
Qty: 30 TABLET | Refills: 5 | Status: SHIPPED | OUTPATIENT
Start: 2021-11-10 | End: 2022-01-13 | Stop reason: SDUPTHER

## 2021-11-10 RX ORDER — LOSARTAN POTASSIUM 50 MG/1
50 TABLET ORAL DAILY
Qty: 30 TABLET | Refills: 5 | Status: SHIPPED | OUTPATIENT
Start: 2021-11-10 | End: 2021-11-10 | Stop reason: SDUPTHER

## 2021-11-10 RX ORDER — LOSARTAN POTASSIUM 50 MG/1
50 TABLET ORAL DAILY
Qty: 30 TABLET | Refills: 5 | Status: SHIPPED | OUTPATIENT
Start: 2021-11-10 | End: 2022-01-13 | Stop reason: SDUPTHER

## 2021-11-10 RX ORDER — BLOOD SUGAR DIAGNOSTIC
1 STRIP MISCELLANEOUS DAILY
Qty: 100 EACH | Refills: 1 | Status: SHIPPED | OUTPATIENT
Start: 2021-11-10

## 2021-11-10 RX ORDER — HYDROCHLOROTHIAZIDE 25 MG/1
25 TABLET ORAL DAILY
Qty: 30 TABLET | Refills: 2 | Status: SHIPPED | OUTPATIENT
Start: 2021-11-10 | End: 2021-11-11

## 2021-11-10 NOTE — TELEPHONE ENCOUNTER
Medication:   Requested Prescriptions     Pending Prescriptions Disp Refills    hydroCHLOROthiazide (HYDRODIURIL) 25 MG tablet [Pharmacy Med Name: HYDROCHLOROTHIAZIDE 25MG TABLETS] 90 tablet      Sig: TAKE 1 TABLET BY MOUTH EVERY DAY        Last Filled:      Patient Phone Number: [phone] (home)     Last appt: 11/10/2021   Next appt: 12/10/2021    Last OARRS:   RX Monitoring 3/27/2018   Attestation The Prescription Monitoring Report for this patient was reviewed today. Periodic Controlled Substance Monitoring Possible medication side effects, risk of tolerance/dependence & alternative treatments discussed. Chronic Pain > 80 MEDD Treatment objectives documented - patient is progressing appropriately.

## 2021-11-10 NOTE — PROGRESS NOTES
CC fu visit    HPI 62 y/o female known htn, dm hyperlipidemia    She did get light headed and dizzy  At one point. Stopped amlodipine 10 mg  And losartan and metformin  She continued hctz and atorvastatin    She wants to discuss today      Physical Exam  Constitutional:       General: She is not in acute distress. Appearance: She is well-developed. She is not diaphoretic. HENT:      Head: Normocephalic and atraumatic. Right Ear: External ear normal.      Left Ear: External ear normal.      Nose: Nose normal.      Mouth/Throat:      Pharynx: No oropharyngeal exudate. Eyes:      General: No scleral icterus. Left eye: No discharge. Conjunctiva/sclera: Conjunctivae normal.      Pupils: Pupils are equal, round, and reactive to light. Neck:      Thyroid: No thyromegaly. Vascular: No JVD. Trachea: No tracheal deviation. Cardiovascular:      Rate and Rhythm: Normal rate and regular rhythm. Heart sounds: Normal heart sounds. No murmur heard. No friction rub. No gallop. Pulmonary:      Effort: Pulmonary effort is normal. No respiratory distress. Breath sounds: Normal breath sounds. No stridor. No wheezing or rales. Chest:      Chest wall: No tenderness. Abdominal:      General: Bowel sounds are normal. There is no distension. Palpations: Abdomen is soft. There is no mass. Tenderness: There is no abdominal tenderness. There is no guarding or rebound. Musculoskeletal:         General: No tenderness. Normal range of motion. Cervical back: Normal range of motion and neck supple. Lymphadenopathy:      Cervical: No cervical adenopathy. Skin:     General: Skin is warm and dry. Coloration: Skin is not pale. Findings: No erythema or rash. Neurological:      Mental Status: She is alert and oriented to person, place, and time. Cranial Nerves: No cranial nerve deficit.       Coordination: Coordination normal.      Deep Tendon Reflexes: Reflexes are normal and symmetric. Reflexes normal.   Psychiatric:         Behavior: Behavior normal.         Thought Content: Thought content normal.         Judgment: Judgment normal.           Lab Results   Component Value Date    CREATININE 0.8 10/06/2021    BUN 20 10/06/2021     10/06/2021    K 4.6 10/06/2021     10/06/2021    CO2 18 (L) 10/06/2021     Lab Results   Component Value Date    WBC 13.9 (H) 10/06/2021    HGB 14.2 10/06/2021    HCT 44.5 10/06/2021    MCV 81.9 10/06/2021     10/06/2021         1. Essential hypertension  bp not at goal < 130/80  Stopped amlodipine 10 mg  And losartan 50 mg due  Light headedness  Now resolved  Continued hctz  D/C amlodipine  Restart losartan(diabetic  Notify me if develops any sxs      - hydroCHLOROthiazide (HYDRODIURIL) 25 MG tablet; Take 1 tablet by mouth daily Cancel hydrochlorothiazide 12.5 mg  Dispense: 30 tablet; Refill: 2    2. Type 2 diabetes mellitus without complication, without long-term current use of insulin ([facility])  AIC 7.1  Goal < 7  Restart metformin  - POCT glycosylated hemoglobin (Hb A1C)  - atorvastatin (LIPITOR) 40 MG tablet; Take 1 tablet by mouth daily  Dispense: 30 tablet; Refill: 5  - losartan (COZAAR) 50 MG tablet; Take 1 tablet by mouth daily  Dispense: 30 tablet; Refill: 5  - metFORMIN (GLUCOPHAGE) 500 MG tablet; Take 1 tablet by mouth 2 times daily (with meals)  Dispense: 60 tablet; Refill: 5  - blood glucose test strips (ACCU-CHEK GUIDE) strip; 1 each by In Vitro route daily  Dispense: 100 each; Refill: 1    3. Mixed hyperlipidemia  Cont. For ascvd prevention  - atorvastatin (LIPITOR) 40 MG tablet; Take 1 tablet by mouth daily  Dispense: 30 tablet; Refill: 5    4. Vitamin D deficiency  On replacement  - vitamin D (ERGOCALCIFEROL) 1.25 MG (15178 UT) CAPS capsule; Take 1 capsule by mouth once a week  Dispense: 4 capsule; Refill: 2    5. Screen for colon cancer    - POCT Fecal Immunochemical Test (FIT);  Future

## 2021-11-11 ENCOUNTER — TELEPHONE (OUTPATIENT)
Dept: PRIMARY CARE CLINIC | Age: 61
End: 2021-11-11

## 2021-11-11 RX ORDER — HYDROCHLOROTHIAZIDE 25 MG/1
TABLET ORAL
Qty: 90 TABLET | Refills: 1 | Status: SHIPPED | OUTPATIENT
Start: 2021-11-11 | End: 2022-01-11

## 2021-11-11 NOTE — TELEPHONE ENCOUNTER
Although I am unsure how long she has been on these medications, patient should stop both, and make appointment to discuss next steps. If she wants, she can try taking 1 by itself such as losartan and see if she still gets the same symptoms. If she has no trouble with the losartan, then she can switch to the Metformin alone holding the losartan and see how that goes. She should then share this information when she sees her PCP. This way we can determine which medication is the culprit.

## 2021-11-11 NOTE — TELEPHONE ENCOUNTER
Patient called in and was seen yesterday and stated that she is having side effects from her medication she is experiencing Sweating ,Vision change ,shallow breathing the patient says every time she takes  Metformin 500 mg & Losartan 50 mg  She expedience this sypmtons   Please be advise

## 2021-12-01 ENCOUNTER — TELEPHONE (OUTPATIENT)
Dept: PRIMARY CARE CLINIC | Age: 61
End: 2021-12-01

## 2021-12-01 NOTE — TELEPHONE ENCOUNTER
Patient called in about some Antibiotic for her she has been having green stuff coming up and cough & runny eyes please send to [facility] on [facility] [phone] please be advise

## 2021-12-03 DIAGNOSIS — J40 BRONCHITIS: Primary | ICD-10-CM

## 2021-12-03 RX ORDER — AZITHROMYCIN 250 MG/1
TABLET, FILM COATED ORAL
Qty: 1 PACKET | Refills: 0 | Status: SHIPPED | OUTPATIENT
Start: 2021-12-03 | End: 2021-12-10

## 2021-12-10 ENCOUNTER — OFFICE VISIT (OUTPATIENT)
Dept: PRIMARY CARE CLINIC | Age: 61
End: 2021-12-10
Payer: MEDICARE

## 2021-12-10 VITALS
DIASTOLIC BLOOD PRESSURE: 86 MMHG | OXYGEN SATURATION: 93 % | WEIGHT: 200 LBS | TEMPERATURE: 97.5 F | BODY MASS INDEX: 37.76 KG/M2 | HEIGHT: 61 IN | SYSTOLIC BLOOD PRESSURE: 131 MMHG | HEART RATE: 64 BPM

## 2021-12-10 DIAGNOSIS — Z00.00 ROUTINE GENERAL MEDICAL EXAMINATION AT A HEALTH CARE FACILITY: Primary | ICD-10-CM

## 2021-12-10 DIAGNOSIS — Z78.9 HEPATITIS B IMMUNE: ICD-10-CM

## 2021-12-10 DIAGNOSIS — R09.81 SINUS CONGESTION: ICD-10-CM

## 2021-12-10 DIAGNOSIS — R68.89 FLU-LIKE SYMPTOMS: ICD-10-CM

## 2021-12-10 PROCEDURE — 1036F TOBACCO NON-USER: CPT | Performed by: FAMILY MEDICINE

## 2021-12-10 PROCEDURE — 3017F COLORECTAL CA SCREEN DOC REV: CPT | Performed by: FAMILY MEDICINE

## 2021-12-10 PROCEDURE — 87880 STREP A ASSAY W/OPTIC: CPT | Performed by: FAMILY MEDICINE

## 2021-12-10 PROCEDURE — G8484 FLU IMMUNIZE NO ADMIN: HCPCS | Performed by: FAMILY MEDICINE

## 2021-12-10 PROCEDURE — G0439 PPPS, SUBSEQ VISIT: HCPCS | Performed by: FAMILY MEDICINE

## 2021-12-10 PROCEDURE — G8427 DOCREV CUR MEDS BY ELIG CLIN: HCPCS | Performed by: FAMILY MEDICINE

## 2021-12-10 PROCEDURE — 99213 OFFICE O/P EST LOW 20 MIN: CPT | Performed by: FAMILY MEDICINE

## 2021-12-10 PROCEDURE — G8417 CALC BMI ABV UP PARAM F/U: HCPCS | Performed by: FAMILY MEDICINE

## 2021-12-10 RX ORDER — AZITHROMYCIN 250 MG/1
TABLET, FILM COATED ORAL
Qty: 1 PACKET | Refills: 0 | Status: SHIPPED | OUTPATIENT
Start: 2021-12-10 | End: 2022-04-13

## 2021-12-10 ASSESSMENT — LIFESTYLE VARIABLES: HOW OFTEN DO YOU HAVE A DRINK CONTAINING ALCOHOL: 0

## 2021-12-10 NOTE — PROGRESS NOTES
CC sinus infection    HPI 64 y/o female sinus congestion  For 2 weeks, not much better after one  Course of z tima. Some runny nose. No fever. No ha , no loss of taste or smell. Slight cough. Appetite ok. No gi complains. No sick family members    Has taken 2 doses of covid vaccine. No recent covid testing      Physical Exam  Constitutional:       General: She is not in acute distress. Appearance: She is well-developed. She is not diaphoretic. HENT:      Head: Normocephalic and atraumatic. Right Ear: External ear normal.      Left Ear: External ear normal.      Nose: Congestion present. Mouth/Throat:      Pharynx: No oropharyngeal exudate. Eyes:      General: No scleral icterus. Left eye: No discharge. Conjunctiva/sclera: Conjunctivae normal.      Pupils: Pupils are equal, round, and reactive to light. Neck:      Thyroid: No thyromegaly. Vascular: No JVD. Trachea: No tracheal deviation. Cardiovascular:      Rate and Rhythm: Normal rate and regular rhythm. Heart sounds: Normal heart sounds. No murmur heard. No friction rub. No gallop. Pulmonary:      Effort: Pulmonary effort is normal. No respiratory distress. Breath sounds: Normal breath sounds. No stridor. No wheezing or rales. Chest:      Chest wall: No tenderness. Abdominal:      General: Bowel sounds are normal. There is no distension. Palpations: Abdomen is soft. There is no mass. Tenderness: There is no abdominal tenderness. There is no guarding or rebound. Musculoskeletal:         General: No tenderness. Normal range of motion. Cervical back: Normal range of motion and neck supple. Lymphadenopathy:      Cervical: No cervical adenopathy. Skin:     General: Skin is warm and dry. Coloration: Skin is not pale. Findings: No erythema or rash. Neurological:      Mental Status: She is alert and oriented to person, place, and time.       Cranial Nerves: No cranial nerve deficit. Coordination: Coordination normal.      Deep Tendon Reflexes: Reflexes are normal and symmetric. Reflexes normal.   Psychiatric:         Behavior: Behavior normal.         Thought Content: Thought content normal.         Judgment: Judgment normal.       1. Sinus congestion  Cont flonase & tylenol  antibiotics  - azithromycin (ZITHROMAX) 250 MG tablet; Take 2 tabs (500 mg) on Day 1, and take 1 tab (250 mg) on days 2 through 5. Dispense: 1 packet; Refill: 0  - COVID-19; Future    2. Flu-like symptoms    - COVID-19; Future  - POCT rapid strep A  - POCT Influenza A/B    Medicare Annual Wellness Visit  Name: Dayan Cerda Date: 12/10/2021   MRN: [id number] Sex: Female   Age: 63 y.o. Ethnicity: Non- / Non    : 1960 Race: Black /       Dennis Padilla is here for Cecilio ANDRADE    Screenings for behavioral, psychosocial and functional/safety risks, and cognitive dysfunction are all negative except as indicated below. These results, as well as other patient data from the [address] form, are documented in Flowsheets linked to this Encounter. No Known Allergies    Prior to Visit Medications    Medication Sig Taking? Authorizing Provider   azithromycin (ZITHROMAX) 250 MG tablet Take 2 tabs (500 mg) on Day 1, and take 1 tab (250 mg) on days 2 through 5.  Yes Fabricio Barboza MD   hydroCHLOROthiazide (HYDRODIURIL) 25 MG tablet TAKE 1 TABLET BY MOUTH EVERY DAY Yes Fabricio Barboza MD   atorvastatin (LIPITOR) 40 MG tablet Take 1 tablet by mouth daily Yes Fabricio Barboza MD   vitamin D (ERGOCALCIFEROL) 1.25 MG (23083 UT) CAPS capsule Take 1 capsule by mouth once a week Yes Fabricio Barboza MD   losartan (COZAAR) 50 MG tablet Take 1 tablet by mouth daily Yes Fabricio Barboza MD   blood glucose test strips (ACCU-CHEK GUIDE) strip 1 each by In Vitro route daily Yes Fabricio Barboza MD   Blood Glucose Monitoring Suppl (ACCU-CHEK GUIDE ME) w/Device KIT CHECK BLOOD SUGAR DAILY - General (Family Medicine)  Bianca Guaman MD as PCP - [facility] Empaneled Provider  Liliam Sexton MD as Obstetrician ([address] Gynecology)  Damien Au RN as Care Transitions Nurse    Wt Readings from Last 3 Encounters:   12/10/21 200 lb (90.7 kg)   11/10/21 197 lb (89.4 kg)   10/04/21 201 lb 1 oz (91.2 kg)     Vitals:    12/10/21 1034   BP: 131/86   Pulse: 64   Temp: 97.5 °F (36.4 °C)   TempSrc: Temporal   SpO2: 93%   Weight: 200 lb (90.7 kg)   Height: 5' 1\" (1.549 m)     Body mass index is 37.79 kg/m². Based upon direct observation of the patient, evaluation of cognition reveals recent and remote memory intact. Patient's complete Health Risk Assessment and screening values have been reviewed and are found in Flowsheets. The following problems were reviewed today and where indicated follow up appointments were made and/or referrals ordered. Positive Risk Factor Screenings with Interventions:          General Health and ACP:  General  In general, how would you say your health is?: Good  In the past 7 days, have you experienced any of the following?  New or Increased Pain, New or Increased Fatigue, Loneliness, Social Isolation, Stress or Anger?: (!) New or Increased Pain  Do you get the social and emotional support that you need?: Yes  Do you have a Living Will?: (!) No  Advance Directives     Power of [address] [place] [address] Will ACP-Advance Directive ACP-Power of     Not on File Not on File Not on File Not on File      General Health Risk Interventions:  · No Living Will: Advance Care Planning addressed with patient today    Health Habits/Nutrition:  Health Habits/Nutrition  Do you exercise for at least 20 minutes 2-3 times per week?: Yes  Have you lost any weight without trying in the past 3 months?: (!) Yes  Do you eat only one meal per day?: No  Have you seen the dentist within the past year?: (!) No  Body mass index: (!) 37.79  Health Habits/Nutrition Interventions:  · Inadequate physical activity:  patient agrees to exercise for at least 150 minutes/week, patient is not ready to increase his/her physical activity level at this time  · Nutritional issues:  educational materials for healthy, well-balanced diet provided  · Dental exam overdue:  patient encouraged to make appointment with his/her dentist    Hearing/Vision:  No exam data present  Hearing/Vision  Do you or your family notice any trouble with your hearing that hasn't been managed with hearing aids?: No  Do you have difficulty driving, watching TV, or doing any of your daily activities because of your eyesight?: No  Have you had an eye exam within the past year?: (!) No  Hearing/Vision Interventions:  · Vision concerns:  patient encouraged to make appointment with his/her eye specialist    Safety:  Safety  Do you have working smoke detectors?: Yes  Have all throw rugs been removed or fastened?: Yes  Do you have non-slip mats or surfaces in all bathtubs/showers?: (!) No  Do all of your stairways have a railing or banister?: Yes  Are your doorways, halls and stairs free of clutter?: Yes  Do you always fasten your seatbelt when you are in a car?: Yes  Safety Interventions:  · will remove any non slip mats from bathroom     Personalized Preventive Plan   Current Health Maintenance Status  Immunization History   Administered Date(s) Administered    Hepatitis B Adult (Engerix-B) 05/08/2019    Hepatitis B Adult (Recombivax HB) 06/06/2019    Pneumococcal Polysaccharide ([id number]) 07/11/2017    Tdap (Boostrix, Adacel) 03/05/2013        Health Maintenance   Topic Date Due    COVID-19 Vaccine (1) Never done    Shingles Vaccine (1 of 2) Never done    Hepatitis B vaccine (3 of 3 - Risk 3-dose series) 10/06/2019    Annual Wellness Visit (AWV)  02/14/2021    Flu vaccine (1) Never done    Diabetic retinal exam  11/02/2021    Diabetic microalbuminuria test  01/11/2022    Diabetic foot exam  06/25/2022    A1C test (Diabetic or Prediabetic)  06/25/2022    Lipid screen  06/25/2022    Potassium monitoring  10/06/2022    Creatinine monitoring  10/06/2022    Breast cancer screen  10/26/2022    DTaP/Tdap/Td vaccine (2 - Td or Tdap) 03/05/2023    Colon cancer screen colonoscopy  05/15/2023    Cervical cancer screen  05/21/2024    Pneumococcal 0-64 years Vaccine (2 of 2 - PPSV23) 06/10/2027    Hepatitis C screen  Completed    HIV screen  Completed    Hepatitis A vaccine  Aged Out    Hib vaccine  Aged Out    Meningococcal (ACWY) vaccine  Aged Out     Recommendations for [employer] Due: see orders and patient instructions/AVS.  . Recommended screening schedule for the next 5-10 years is provided to the patient in written form: see Patient Cielo Aguilar was seen today for medicare awv. Diagnoses and all orders for this visit:    Sinus congestion  -     azithromycin (ZITHROMAX) 250 MG tablet;  Take 2 tabs (500 mg) on Day 1, and take 1 tab (250 mg) on days 2 through 5.  -     COVID-19; Future    Flu-like symptoms  -     COVID-19; Future  -     POCT rapid strep A  -     POCT Influenza A/B

## 2021-12-10 NOTE — PATIENT INSTRUCTIONS
Patient Education        Eating Healthy Foods: Care Instructions  Your Care Instructions     Eating healthy foods can help lower your risk for disease. Healthy food gives you energy and keeps your heart strong, your brain active, your muscles working, and your bones strong. A healthy diet includes a variety of foods from the basic food groups: grains, vegetables, fruits, milk and milk products, and meat and beans. Some people may eat more of their favorite foods from only one food group and, as a result, miss getting the nutrients they need. So, it is important to pay attention not only to what you eat but also to what you are missing from your diet. You can eat a healthy, balanced diet by making a few small changes. Follow-up care is a key part of your treatment and safety. Be sure to make and go to all appointments, and call your doctor if you are having problems. It's also a good idea to know your test results and keep a list of the medicines you take. How can you care for yourself at home? Look at what you eat  · Keep a food diary for a week or two and record everything you eat or drink. Track the number of servings you eat from each food group. · For a balanced diet every day, eat a variety of:  ? 6 or more ounce-equivalents of grains, such as cereals, breads, crackers, rice, or pasta, every day. An ounce-equivalent is 1 slice of bread, 1 cup of ready-to-eat cereal, or ½ cup of cooked rice, cooked pasta, or cooked cereal.  ? 2½ cups of vegetables, especially:  § Dark-green vegetables such as broccoli and spinach. § Orange vegetables such as carrots and sweet potatoes. § Dry beans (such as little and kidney beans) and peas (such as lentils). ? 2 cups of fresh, frozen, or canned fruit. A small apple or 1 banana or orange equals 1 cup. ? 3 cups of nonfat or low-fat milk, yogurt, or other milk products. ? 5½ ounces of meat and beans, such as chicken, fish, lean meat, beans, nuts, and seeds.  One egg, 1 tablespoon of peanut butter, ½ ounce nuts or seeds, or ¼ cup of cooked beans equals 1 ounce of meat. · Learn how to read food labels for serving sizes and ingredients. Fast-food and convenience-food meals often contain few or no fruits or vegetables. Make sure you eat some fruits and vegetables to make the meal more nutritious. · Look at your food diary. For each food group, add up what you have eaten and then divide the total by the number of days. This will give you an idea of how much you are eating from each food group. See if you can find some ways to change your diet to make it more healthy. Start small  · Do not try to make dramatic changes to your diet all at once. You might feel that you are missing out on your favorite foods and then be more likely to fail. · Start slowly, and gradually change your habits. Try some of the following:  ? Use whole wheat bread instead of white bread. ? Use nonfat or low-fat milk instead of whole milk. ? Eat brown rice instead of white rice, and eat whole wheat pasta instead of white-flour pasta. ? Try low-fat cheeses and low-fat yogurt. ? Add more fruits and vegetables to meals and have them for snacks. ? Add lettuce, tomato, cucumber, and onion to sandwiches. ? Add fruit to yogurt and cereal.  Enjoy food  · You can still eat your favorite foods. You just may need to eat less of them. If your favorite foods are high in fat, salt, and sugar, limit how often you eat them, but do not cut them out entirely. · Eat a wide variety of foods. Make healthy choices when eating out  · The type of restaurant you choose can help you make healthy choices. Even fast-food chains are now offering more low-fat or healthier choices on the menu. · Choose smaller portions, or take half of your meal home. · When eating out, try:  ? A veggie pizza with a whole wheat crust or grilled chicken (instead of sausage or pepperoni).   ? Pasta with roasted vegetables, grilled chicken, or Preventive Care list are included within your After Visit Summary for your review. Other Preventive Recommendations:    · A preventive eye exam performed by an eye specialist is recommended every 1-2 years to screen for glaucoma; cataracts, macular degeneration, and other eye disorders. · A preventive dental visit is recommended every 6 months. · Try to get at least 150 minutes of exercise per week or 10,000 steps per day on a pedometer . · Order or download the FREE \"Exercise & Physical Activity: Your Everyday Guide\" from The [employer] on Aging. Call 4-536.404.7390 or search [employer] on Aging online. · You need 6527-6950 mg of calcium and 9842-1500 IU of vitamin D per day. It is possible to meet your calcium requirement with diet alone, but a vitamin D supplement is usually necessary to meet this goal.  · When exposed to the sun, use a sunscreen that protects against both UVA and UVB radiation with an SPF of 30 or greater. Reapply every 2 to 3 hours or after sweating, drying off with a towel, or swimming. · Always wear a seat belt when traveling in a car. Always wear a helmet when riding a bicycle or motorcycle.       Get covid testing done

## 2022-01-06 ENCOUNTER — OFFICE VISIT (OUTPATIENT)
Dept: ORTHOPEDIC SURGERY | Age: 62
End: 2022-01-06
Payer: MEDICARE

## 2022-01-06 VITALS — BODY MASS INDEX: 37.76 KG/M2 | WEIGHT: 200 LBS | RESPIRATION RATE: 17 BRPM | HEIGHT: 61 IN

## 2022-01-06 DIAGNOSIS — Z96.651 HISTORY OF TOTAL KNEE ARTHROPLASTY, RIGHT: Primary | ICD-10-CM

## 2022-01-06 PROCEDURE — G8427 DOCREV CUR MEDS BY ELIG CLIN: HCPCS | Performed by: PHYSICIAN ASSISTANT

## 2022-01-06 PROCEDURE — 3017F COLORECTAL CA SCREEN DOC REV: CPT | Performed by: PHYSICIAN ASSISTANT

## 2022-01-06 PROCEDURE — G8417 CALC BMI ABV UP PARAM F/U: HCPCS | Performed by: PHYSICIAN ASSISTANT

## 2022-01-06 PROCEDURE — 99213 OFFICE O/P EST LOW 20 MIN: CPT | Performed by: PHYSICIAN ASSISTANT

## 2022-01-06 PROCEDURE — 1036F TOBACCO NON-USER: CPT | Performed by: PHYSICIAN ASSISTANT

## 2022-01-06 PROCEDURE — G8484 FLU IMMUNIZE NO ADMIN: HCPCS | Performed by: PHYSICIAN ASSISTANT

## 2022-01-06 NOTE — PROGRESS NOTES
Subjective:      Patient ID: Lara Cross is a 63 y.o.  female. Here for annual follow up visit. S/P right knee arthroplasty. The date of procedure-2020. Surgeon: Taye Maxwell  Issues or complaints: No complaints. Review of Systems:   Negative for fever or chills. Negative for numbness or tingling in the affected extremity.        Past Medical History:   Diagnosis Date    Allergic rhinitis     Arthritis     Asthma     not currently medically treated    Essential hypertension 2018    Hyperlipidemia     Neuropathy     Osteoarthritis     Type 2 diabetes mellitus without complication, without long-term current use of insulin ([address] 2019    Wears glasses        Family History   Problem Relation Age of Onset    Cancer Mother         pancreatic    Cirrhosis Father     High Blood Pressure Father        Past Surgical History:   Procedure Laterality Date    CARPAL TUNNEL RELEASE Right 7-10-14    FOOT SURGERY Right     rods placed    KNEE ARTHROSCOPY Right     MENISCECTOMY Right     TOTAL KNEE ARTHROPLASTY Right 2020    RIGHT TOTAL KNEE REPLACEMENT ROBOTIC ASSISTED performed by Isabel Bertrand MD at [address] [place] [address] History     Occupational History    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 1 packet 0    hydroCHLOROthiazide (HYDRODIURIL) 25 MG tablet TAKE 1 TABLET BY MOUTH EVERY DAY 90 tablet 1  atorvastatin (LIPITOR) 40 MG tablet Take 1 tablet by mouth daily 30 tablet 5    vitamin D (ERGOCALCIFEROL) 1.25 MG (37970 UT) CAPS capsule Take 1 capsule by mouth once a week 4 capsule 2    losartan (COZAAR) 50 MG tablet Take 1 tablet by mouth daily 30 tablet 5    metFORMIN (GLUCOPHAGE) 500 MG tablet Take 1 tablet by mouth 2 times daily (with meals) (Patient not taking: Reported on 12/10/2021) 60 tablet 5    blood glucose test strips (ACCU-CHEK GUIDE) strip 1 each by In Vitro route daily 100 each 1    tiZANidine (ZANAFLEX) 4 MG tablet TAKE ONE TABLET BY MOUTH FOUR TIMES A DAY AS NEEDED FOR SPASM (Patient not taking: Reported on 11/10/2021) 40 tablet 0    Blood Glucose Monitoring Suppl (ACCU-CHEK GUIDE ME) w/Device KIT CHECK BLOOD SUGAR DAILY 1 kit 0    Lancets MISC 1 each by Does not apply route daily 100 each 1    albuterol-ipratropium (COMBIVENT RESPIMAT)  MCG/ACT AERS inhaler Inhale 1 puff into the lungs every 6 hours as needed for Wheezing 1 each 0    aspirin 81 MG chewable tablet Take 81 mg by mouth daily (Patient not taking: Reported on 11/10/2021)      fluticasone (FLONASE) 50 MCG/ACT nasal spray 1 spray by Nasal route daily 1 Bottle 3    Blood Pressure KIT Ck blood pressure once a week 1 kit 0    acetaminophen (TYLENOL) 500 MG tablet Take 1,000 mg by mouth every 4 hours as needed for Pain       No current facility-administered medications for this visit. Objective:   She is alert, oriented x 3, pleasant, well nourished, developed and in no acute distress. Resp 17   Ht 5' 1\" (1.549 m)   Wt 200 lb (90.7 kg)   LMP 06/01/2014   BMI 37.79 kg/m²      Examination of the right knee: Inspection of the skin demonstrates a well-healed midline incision. Inspection of the soft tissues without significant swelling or erythema. The overall alignment of the knee is neutral.  There is minimal intra-articular effusion.   AROM     Extension 0     Flexion  120+   There no pain associated with ROM testing. Pes anserine bursa non-tender to palpation. Patellar tendon non-tender to palpation. Quadriceps tendon non-tender to palpation. Collateral ligaments non-tender to palpation. Popliteal fossa non-tender to palpation. Retro patellar crepitus is not present. There is no instability with varus/valgus stress testing in full extension or 90 degrees of flexion. There is no instability with anterior drawer testing. Extensor Mechanism is intact. Examination of the lower extremities are intact with sensation to light touch. Motor testing  5/5 in all major motor groups of the lower extremities. SLR negative. Examination of the lower extremities shows intact perfusion to all extremities. No cyanosis. Digits are warm to touch, capillary refill is less than 2 seconds. There is no edema noted. Examination of the skin over both lower extremities reveals: The skin to be intact without lacerations or abrasions. No significant erythema. No rashes or skin lesions. X Rays: were performed in the office today:   AP Standing, Lateral and Sunrise Right Knee: There is a right uncemented total knee arthroplasty present. The alignment is satisfactory. There are no signs of failure or loosening. Diagnosis:        ICD-10-CM    1. History of total knee arthroplasty, right  Z96.651 XR KNEE RIGHT (3 VIEWS)          Assessment/ Plan:     Assessment:    Clinically and radiographically stable right knee arthroplasty 2 years post op. Plan:  Medications-discussed the risk and benefits of prophylactic antibiotics for dental and surgical procedures. PT- A home exercise program was instructed today including ROM exercises and strengthening exercises. The patient verbalized understanding of these exercises as well as the importance of the exercise program to promote return of normal function. If pain intensifies or other problems arise you are to notify the office.          Follow up- 1 year   Call or return to clinic if these symptoms worsen or fail to improve as anticipated.

## 2022-01-11 DIAGNOSIS — I10 ESSENTIAL HYPERTENSION: ICD-10-CM

## 2022-01-11 RX ORDER — HYDROCHLOROTHIAZIDE 25 MG/1
TABLET ORAL
Qty: 60 TABLET | Refills: 5 | Status: SHIPPED | OUTPATIENT
Start: 2022-01-11 | End: 2022-01-17 | Stop reason: SDUPTHER

## 2022-01-13 DIAGNOSIS — E11.9 TYPE 2 DIABETES MELLITUS WITHOUT COMPLICATION, WITHOUT LONG-TERM CURRENT USE OF INSULIN (HCC): ICD-10-CM

## 2022-01-13 DIAGNOSIS — E78.2 MIXED HYPERLIPIDEMIA: ICD-10-CM

## 2022-01-13 RX ORDER — ATORVASTATIN CALCIUM 40 MG/1
40 TABLET, FILM COATED ORAL DAILY
Qty: 90 TABLET | Refills: 1 | Status: SHIPPED | OUTPATIENT
Start: 2022-01-13 | End: 2022-09-09 | Stop reason: SDUPTHER

## 2022-01-13 RX ORDER — LOSARTAN POTASSIUM 50 MG/1
50 TABLET ORAL DAILY
Qty: 90 TABLET | Refills: 1 | Status: SHIPPED | OUTPATIENT
Start: 2022-01-13 | End: 2022-01-17 | Stop reason: SDUPTHER

## 2022-01-13 RX ORDER — AMLODIPINE BESYLATE 10 MG/1
10 TABLET ORAL DAILY
Qty: 30 TABLET | Refills: 2 | Status: SHIPPED | OUTPATIENT
Start: 2022-01-13 | End: 2022-01-17 | Stop reason: SDUPTHER

## 2022-01-13 RX ORDER — AMLODIPINE BESYLATE 10 MG/1
TABLET ORAL
COMMUNITY
Start: 2022-01-12 | End: 2022-01-13 | Stop reason: SDUPTHER

## 2022-01-13 NOTE — TELEPHONE ENCOUNTER
Medication:   Requested Prescriptions     Pending Prescriptions Disp Refills    amLODIPine (NORVASC) 10 MG tablet 30 tablet         Last Filled:      Patient Phone Number: [phone] (home)     Last appt: 12/10/2021   Next appt: 2/11/2022    Last OARRS:   RX Monitoring 3/27/2018   Attestation The Prescription Monitoring Report for this patient was reviewed today. Periodic Controlled Substance Monitoring Possible medication side effects, risk of tolerance/dependence & alternative treatments discussed. Chronic Pain > 80 MEDD Treatment objectives documented - patient is progressing appropriately.

## 2022-01-17 DIAGNOSIS — E11.9 TYPE 2 DIABETES MELLITUS WITHOUT COMPLICATION, WITHOUT LONG-TERM CURRENT USE OF INSULIN (HCC): ICD-10-CM

## 2022-01-17 DIAGNOSIS — I10 ESSENTIAL HYPERTENSION: ICD-10-CM

## 2022-01-17 RX ORDER — LOSARTAN POTASSIUM 50 MG/1
50 TABLET ORAL DAILY
Qty: 90 TABLET | Refills: 1 | Status: SHIPPED | OUTPATIENT
Start: 2022-01-17 | End: 2022-09-09 | Stop reason: SDUPTHER

## 2022-01-17 RX ORDER — AMLODIPINE BESYLATE 10 MG/1
10 TABLET ORAL DAILY
Qty: 90 TABLET | Refills: 2 | Status: SHIPPED | OUTPATIENT
Start: 2022-01-17 | End: 2022-09-09 | Stop reason: SDUPTHER

## 2022-01-17 RX ORDER — HYDROCHLOROTHIAZIDE 25 MG/1
25 TABLET ORAL DAILY
Qty: 90 TABLET | Refills: 5 | Status: SHIPPED | OUTPATIENT
Start: 2022-01-17

## 2022-01-17 NOTE — TELEPHONE ENCOUNTER
Medication:   Requested Prescriptions     Pending Prescriptions Disp Refills    amLODIPine (NORVASC) 10 MG tablet 90 tablet 2     Sig: Take 1 tablet by mouth daily    hydroCHLOROthiazide (HYDRODIURIL) 25 MG tablet 90 tablet 5     Sig: Take 1 tablet by mouth daily    losartan (COZAAR) 50 MG tablet 90 tablet 1     Sig: Take 1 tablet by mouth daily        Last Filled:      Patient Phone Number: [phone] (home)     Last appt: 12/10/2021   Next appt: 2/11/2022    Last OARRS:   RX Monitoring 3/27/2018   Attestation The Prescription Monitoring Report for this patient was reviewed today. Periodic Controlled Substance Monitoring Possible medication side effects, risk of tolerance/dependence & alternative treatments discussed. Chronic Pain > 80 MEDD Treatment objectives documented - patient is progressing appropriately.

## 2022-01-17 NOTE — TELEPHONE ENCOUNTER
Medication:   Requested Prescriptions     Pending Prescriptions Disp Refills    amLODIPine (NORVASC) 10 MG tablet 30 tablet 2     Sig: Take 1 tablet by mouth daily        Last Filled:      Patient Phone Number: [phone] (home)     Last appt: 12/10/2021   Next appt: 2/11/2022    Last OARRS:   RX Monitoring 3/27/2018   Attestation The Prescription Monitoring Report for this patient was reviewed today. Periodic Controlled Substance Monitoring Possible medication side effects, risk of tolerance/dependence & alternative treatments discussed. Chronic Pain > 80 MEDD Treatment objectives documented - patient is progressing appropriately.

## 2022-03-07 ENCOUNTER — TELEPHONE (OUTPATIENT)
Dept: PHARMACY | Facility: CLINIC | Age: 62
End: 2022-03-07

## 2022-03-07 NOTE — TELEPHONE ENCOUNTER
[facility] CLINICAL PHARMACY REVIEW: ADHERENCE REVIEW  Identified care gap per Rene; fills at [facility]: ACE/ARB, Diabetes and Statin adherence      Last Visit: 12.10.21    ASSESSMENT  ACE/ARB ADHERENCE  Per Insurance Records through 21 :   RX:  Losartan 50 mg tab  last filled on 21 for 90 day supply. Next refill due: 21. Per Reconciled Dispense Report:  Last filled 22 for 60 day supply    Per Pharmacy: [facility]  last picked up on 22 for 90 day supply. Refills remainin  Billed through [facility]     BP Readings from Last 3 Encounters:   12/10/21 131/86   11/10/21 (!) 135/93   10/06/21 117/74     CrCl cannot be calculated (Patient's most recent lab result is older than the maximum 120 days allowed. ). DIABETES ADHERENCE  Per Insurance Records through 21 :   RX:  Metformin 500 mg tab  last filled on 21 for 90 day supply. Next refill due: 21. Per Reconciled Dispense Report:  (same as above). Per Pharmacy: [facility]  last picked up on 21 for 90 day supply. Refills remainin  Billed through [employer]   Component Value Date    LABA1C 5.8 2021    LABA1C 6.0 2020    LABA1C 6.3 2020     NOTE A1c not yet completed this calendar year    [facility]  Per Insurance Records through 21 :   RX:  Atorvastatin 40 mg tab  last filled on 21 for 90 day supply. Next refill due: 21. Per Reconciled Dispense Report:  Last filled 22 for 30 day supply     Per Pharmacy: [facility]  last picked up on 22 for 90 day supply.    Refills remainin  Billed through [employer]   Component Value Date    CHOL 190 2018    TRIG 157 (H) 2018    HDL 53 2021    LDLCALC 104 (H) 2021     ALT   Date Value Ref Range Status   10/06/2021 14 10 - 40 U/L Final     AST   Date Value Ref Range Status   10/06/2021 14 (L) 15 - 37 U/L Final     Comment:     Specimen hemolysis has exceeded the interference as defined by Roche. Value may be falsely increased. Suggest recollection if clinically  indicated. The 10-year ASCVD risk score (Pily Ga, et al., 2013) is: 15.9%    Values used to calculate the score:      Age: 63 years      Sex: Female      Is Non- : Yes      Diabetic: Yes      Tobacco smoker: No      Systolic Blood Pressure: 302 mmHg      Is BP treated: Yes      HDL Cholesterol: 53 mg/dL      Total Cholesterol: 198 mg/dL     PLAN  The following are interventions that have been identified:     - Patient overdue refilling Metformin.     - Need to verify if patient is currently taking Metformin 500 mg tab 1 BID (IS on med list). - if yes, pharmacy has refills available to fill. Attempting to reach patient to review; left message asking for return call.         Future Appointments   Date Time Provider Boris Mathur   3/22/2022 11:00 AM Kelly Gonzalez MD [facility] GYN MMA       Fragoso Beverage, Beti Coronel [address] Specialist  [facility]  Phone: toll free [phone]

## 2022-03-08 NOTE — TELEPHONE ENCOUNTER
For Pharmacy Admin Tracking Only     CPA in place:  No   Intervention Detail: Adherence Monitoring: 3   Gap Closed?: No    Time Spent (min): 15

## 2022-03-08 NOTE — TELEPHONE ENCOUNTER
CLINICAL PHARMACY: ADHERENCE REVIEW    2nd Attempt Documentation:   Spoke with patient, she confirms that she is no longer taking Metformin and that it should have been removed from her medication list. (medication removed from list DT SE).

## 2022-04-12 ENCOUNTER — OFFICE VISIT (OUTPATIENT)
Dept: GYNECOLOGY | Age: 62
End: 2022-04-12
Payer: MEDICARE

## 2022-04-12 VITALS
WEIGHT: 205 LBS | SYSTOLIC BLOOD PRESSURE: 134 MMHG | OXYGEN SATURATION: 96 % | DIASTOLIC BLOOD PRESSURE: 78 MMHG | HEART RATE: 78 BPM | BODY MASS INDEX: 38.71 KG/M2 | HEIGHT: 61 IN

## 2022-04-12 DIAGNOSIS — Z01.419 WELL WOMAN EXAM WITH ROUTINE GYNECOLOGICAL EXAM: Primary | ICD-10-CM

## 2022-04-12 DIAGNOSIS — Z12.31 ENCOUNTER FOR SCREENING MAMMOGRAM FOR MALIGNANT NEOPLASM OF BREAST: ICD-10-CM

## 2022-04-12 PROCEDURE — 99396 PREV VISIT EST AGE 40-64: CPT | Performed by: OBSTETRICS & GYNECOLOGY

## 2022-04-13 ENCOUNTER — TELEPHONE (OUTPATIENT)
Dept: PRIMARY CARE CLINIC | Age: 62
End: 2022-04-13

## 2022-04-13 DIAGNOSIS — R05.9 COUGH: Primary | ICD-10-CM

## 2022-04-13 DIAGNOSIS — J01.91 ACUTE RECURRENT SINUSITIS, UNSPECIFIED LOCATION: Primary | ICD-10-CM

## 2022-04-13 RX ORDER — AZITHROMYCIN 250 MG/1
TABLET, FILM COATED ORAL
Qty: 1 PACKET | Refills: 0 | Status: SHIPPED | OUTPATIENT
Start: 2022-04-13 | End: 2022-04-25

## 2022-04-13 RX ORDER — FLUTICASONE PROPIONATE 50 MCG
1 SPRAY, SUSPENSION (ML) NASAL DAILY
Qty: 16 G | Refills: 3 | Status: SHIPPED | OUTPATIENT
Start: 2022-04-13 | End: 2022-04-25

## 2022-04-13 RX ORDER — PROMETHAZINE HYDROCHLORIDE AND CODEINE PHOSPHATE 6.25; 1 MG/5ML; MG/5ML
5 SYRUP ORAL EVERY 4 HOURS PRN
Qty: 118 ML | Refills: 0 | Status: SHIPPED | OUTPATIENT
Start: 2022-04-13 | End: 2022-04-16

## 2022-04-13 NOTE — TELEPHONE ENCOUNTER
----- Message from Zaida Cantu MA sent at 4/13/2022  7:44 AM EDT -----  Subject: Appointment Request    Reason for Call: Urgent Cough Cold    QUESTIONS  Type of Appointment? Established Patient  Reason for appointment request? No appointments available during search  Additional Information for Provider? Cough, congestion, runny nose,   chills. Requesting Rx for cough medicine -Promethazine/codeine. OTC meds   are not working.   ---------------------------------------------------------------------------  --------------  Gokul POWELL  What is the best way for the office to contact you? OK to leave message on   voicemail  Preferred Call Back Phone Number? [phone]  ---------------------------------------------------------------------------  --------------  SCRIPT ANSWERS  Relationship to Patient? Self  Are you currently unable to finish sentences due to any difficulty   breathing? No  Are you unable to swallow liquids? No  Are you having fevers (100.4 or greater), chills, or sweats? Yes   Have you been diagnosed with, awaiting test results for, or told that you   are suspected of having COVID-19 (Coronavirus)? (If patient has tested   negative or was tested as a requirement for work, school, or travel and   not based on symptoms, answer no)? No  Within the past 10 days have you developed any of the following symptoms   (answer no if symptoms have been present longer than 10 days or began   more than 10 days ago)? Fever or Chills, Cough, Shortness of breath or   difficulty breathing, Loss of taste or smell, Sore throat, Nasal   congestion, Sneezing or runny nose, Fatigue or generalized body aches   (answer no if pain is specific to a body part e.g. back pain), Diarrhea,   Headache?  Yes

## 2022-04-13 NOTE — TELEPHONE ENCOUNTER
Notify pt she needs to get a covid test  I will send scripts for cough meds  Antibiotic  She should call me in a week  If covid test is negative she can make an in office follow up in a week

## 2022-04-16 ASSESSMENT — ENCOUNTER SYMPTOMS
RESPIRATORY NEGATIVE: 1
GASTROINTESTINAL NEGATIVE: 1
EYES NEGATIVE: 1

## 2022-04-16 NOTE — PROGRESS NOTES
Subjective:      Patient ID: Yajaira Billings is a 63 y.o. female. Patient is here for annual. Patient in menopause. Gynecologic Exam  Associated symptoms include arthralgias. Review of Systems   Constitutional: Negative. HENT: Negative. Eyes: Negative. Respiratory: Negative. Cardiovascular: Negative. Gastrointestinal: Negative. Genitourinary: Negative. Musculoskeletal: Positive for arthralgias. Skin: Negative. Neurological: Negative. Psychiatric/Behavioral: Negative.       Date of Birth 1960  Past Medical History:   Diagnosis Date    Allergic rhinitis     Arthritis     Asthma     not currently medically treated    Essential hypertension 2018    Hyperlipidemia     Neuropathy     Osteoarthritis     Type 2 diabetes mellitus without complication, without long-term current use of insulin ([address] 2019    Wears glasses      Past Surgical History:   Procedure Laterality Date    CARPAL TUNNEL RELEASE Right 7-10-14    FOOT SURGERY Right     rods placed    KNEE ARTHROSCOPY Right     MENISCECTOMY Right     TOTAL KNEE ARTHROPLASTY Right 2020    RIGHT TOTAL KNEE REPLACEMENT ROBOTIC ASSISTED performed by Angelina Christie MD at [place] [address]     OB History    Para Term  AB Living   6 6 6     6   SAB IAB Ectopic Molar Multiple Live Births           1        # Outcome Date GA Lbr Lior/2nd Weight Sex Delivery Anes PTL Lv   6 Term 1994 40w0d   M Vag-Spont      5 Term 12 37w0d   M Vag-Spont      4 Term 46 37w0d   F Vag-Spont      3 Term 12 37w0d   M Vag-Spont      2 Term 26 37w0d   F Vag-Spont      1 Term 36 37w0d   M Vag-Spont        Social History     Socioeconomic History    Marital status: Single     Spouse name: Not on file    Number of children: 10    Years of education: Not on file    Highest education level: Not on file   Occupational History    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   Tobacco Use    Smoking status: Former Smoker     Packs/day: 1.50     Years: 9.00     Pack years: 13.50     Types: Cigarettes     Quit date: 2009     Years since quittin.7    Smokeless tobacco: Never Used    Tobacco comment: quit:  7 years ago   Vaping Use    Vaping Use: Never used   Substance and Sexual Activity    Alcohol use: Yes     Comment: rare    Drug use: Never    Sexual activity: Never   Other Topics Concern    Not on file   Social History Narrative    Not on file     Social Determinants of Health     Financial Resource Strain: Low Risk     Difficulty of Paying Living Expenses: Not hard at all   Food Insecurity: No Food Insecurity    Worried About Running Out of Food in the Last Year: Never true    Stef of Food in the Last Year: Never true   Transportation Needs:     Lack of Transportation (Medical): Not on file    Lack of Transportation (Non-Medical):  Not on file   Physical Activity:     Days of Exercise per Week: Not on file    Minutes of Exercise per Session: Not on file   Stress:     Feeling of Stress : Not on file   Social Connections:     Frequency of Communication with Friends and Family: Not on file    Frequency of Social Gatherings with Friends and Family: Not on file    Attends [employer] Services: Not on file    Active Member of [address] [place] [address] [employer] or Organizations: Not on file    Attends Club or Organization Meetings: Not on file    Marital Status: Not on file   Intimate Partner Violence:     Fear of Current or Ex-Partner: Not on file    Emotionally Abused: Not on file    Physically Abused: Not on file    Sexually Abused: Not on file   Housing Stability:     Unable to Pay for Housing in the Last Year: Not on file    Number of [place] in the Last Year: Not on file    Unstable Housing in the Last Year: Not on file     No Known Allergies  Outpatient Medications Marked as Taking for the 22 encounter (Office Visit) with Isaac Robison MD   Medication Sig Dispense Refill    amLODIPine (NORVASC) 10 MG tablet Take 1 tablet by mouth daily 90 tablet 2    hydroCHLOROthiazide (HYDRODIURIL) 25 MG tablet Take 1 tablet by mouth daily 90 tablet 5    losartan (COZAAR) 50 MG tablet Take 1 tablet by mouth daily 90 tablet 1    atorvastatin (LIPITOR) 40 MG tablet Take 1 tablet by mouth daily 90 tablet 1    vitamin D (ERGOCALCIFEROL) 1.25 MG (38100 UT) CAPS capsule Take 1 capsule by mouth once a week 4 capsule 2    blood glucose test strips (ACCU-CHEK GUIDE) strip 1 each by In Vitro route daily 100 each 1    Blood Glucose Monitoring Suppl (ACCU-CHEK GUIDE ME) w/Device KIT CHECK BLOOD SUGAR DAILY 1 kit 0    Lancets MISC 1 each by Does not apply route daily 100 each 1    albuterol-ipratropium (COMBIVENT RESPIMAT)  MCG/ACT AERS inhaler Inhale 1 puff into the lungs every 6 hours as needed for Wheezing 1 each 0    aspirin 81 MG chewable tablet Take 81 mg by mouth daily       [DISCONTINUED] fluticasone (FLONASE) 50 MCG/ACT nasal spray 1 spray by Nasal route daily 1 Bottle 3    Blood Pressure KIT Ck blood pressure once a week 1 kit 0    acetaminophen (TYLENOL) 500 MG tablet Take 1,000 mg by mouth every 4 hours as needed for Pain       Family History   Problem Relation Age of Onset    Cancer Mother         pancreatic    Cirrhosis Father     High Blood Pressure Father      /78   Pulse 78   Ht 5' 1\" (1.549 m)   Wt 205 lb (93 kg)   LMP 06/01/2014   SpO2 96%   BMI 38.73 kg/m²       Objective:   Physical Exam  Constitutional:       General: She is not in acute distress. Appearance: Normal appearance. She is well-developed and normal weight. She is not diaphoretic. HENT:      Head: Normocephalic and atraumatic. Nose: Nose normal.      Mouth/Throat:      Mouth: Mucous membranes are moist.      Pharynx: Oropharynx is clear. Eyes:      Pupils: Pupils are equal, round, and reactive to light. Neck:      Thyroid: No thyromegaly. Cardiovascular:      Rate and Rhythm: Normal rate and regular rhythm.       Heart sounds: Normal heart sounds. No murmur heard. No friction rub. No gallop. Pulmonary:      Effort: Pulmonary effort is normal. No respiratory distress. Breath sounds: Normal breath sounds. No wheezing or rales. Chest:   Breasts:      Right: Normal. No swelling, bleeding, inverted nipple, mass, nipple discharge, skin change or tenderness. Left: Normal. No swelling, bleeding, inverted nipple, mass, nipple discharge, skin change or tenderness. Abdominal:      General: Abdomen is flat. Bowel sounds are normal. There is no distension. Palpations: Abdomen is soft. There is no hepatomegaly or mass. Tenderness: There is no abdominal tenderness. There is no guarding or rebound. Hernia: No hernia is present. There is no hernia in the left inguinal area. Genitourinary:     General: Normal vulva. Exam position: Lithotomy position. Pubic Area: No rash. Labia:         Right: No rash, tenderness, lesion or injury. Left: No rash, tenderness, lesion or injury. Urethra: No prolapse, urethral pain, urethral swelling or urethral lesion. Vagina: Normal. No signs of injury and foreign body. No vaginal discharge, erythema, tenderness or bleeding. Cervix: No cervical motion tenderness, discharge, friability, lesion, erythema, cervical bleeding or eversion. Uterus: Not deviated, not enlarged, not fixed, not tender and no uterine prolapse. Adnexa:         Right: No mass, tenderness or fullness. Left: No mass, tenderness or fullness. Rectum: Normal. Guaiac result negative. No mass, tenderness, anal fissure, external hemorrhoid or internal hemorrhoid. Normal anal tone. Comments: Normal urethral meatus, nl urethra, nl bladder. Musculoskeletal:         General: No tenderness. Normal range of motion. Cervical back: Normal range of motion and neck supple. No rigidity. Lymphadenopathy:      Cervical: No cervical adenopathy.       Lower Body: No right inguinal adenopathy. No left inguinal adenopathy. Skin:     General: Skin is warm and dry. Findings: No erythema or rash. Neurological:      General: No focal deficit present. Mental Status: She is alert and oriented to person, place, and time. Deep Tendon Reflexes: Reflexes are normal and symmetric. Psychiatric:         Mood and Affect: Mood normal.         Behavior: Behavior normal.         Thought Content: Thought content normal.         Judgment: Judgment normal.         Assessment:      1. Annual  2. menopause      Plan:      1.  Pap, calcium, exercise, mammogram, hemocult negative  2. stable        Sandy Prince MD

## 2022-04-21 ENCOUNTER — TELEPHONE (OUTPATIENT)
Dept: PRIMARY CARE CLINIC | Age: 62
End: 2022-04-21

## 2022-04-25 ENCOUNTER — HOSPITAL ENCOUNTER (OUTPATIENT)
Age: 62
Discharge: HOME | End: 2022-04-25
Payer: MEDICARE

## 2022-04-25 ENCOUNTER — OFFICE VISIT (OUTPATIENT)
Dept: PRIMARY CARE CLINIC | Age: 62
End: 2022-04-25
Payer: MEDICARE

## 2022-04-25 ENCOUNTER — HOSPITAL ENCOUNTER (OUTPATIENT)
Dept: GENERAL RADIOLOGY | Age: 62
Discharge: HOME | End: 2022-04-25
Payer: MEDICARE

## 2022-04-25 VITALS
BODY MASS INDEX: 38.71 KG/M2 | DIASTOLIC BLOOD PRESSURE: 85 MMHG | OXYGEN SATURATION: 95 % | HEART RATE: 78 BPM | WEIGHT: 205 LBS | TEMPERATURE: 96.8 F | SYSTOLIC BLOOD PRESSURE: 125 MMHG | HEIGHT: 61 IN

## 2022-04-25 DIAGNOSIS — R05.9 COUGH: Primary | ICD-10-CM

## 2022-04-25 DIAGNOSIS — J45.909: ICD-10-CM

## 2022-04-25 DIAGNOSIS — E66.01 SEVERE OBESITY (BMI 35.0-39.9) WITH COMORBIDITY (HCC): ICD-10-CM

## 2022-04-25 DIAGNOSIS — R05.9 COUGH: ICD-10-CM

## 2022-04-25 DIAGNOSIS — E11.9 TYPE 2 DIABETES MELLITUS WITHOUT COMPLICATION, WITHOUT LONG-TERM CURRENT USE OF INSULIN (HCC): ICD-10-CM

## 2022-04-25 LAB — HBA1C MFR BLD: 6.4 %

## 2022-04-25 PROCEDURE — 99214 OFFICE O/P EST MOD 30 MIN: CPT | Performed by: FAMILY MEDICINE

## 2022-04-25 PROCEDURE — G8417 CALC BMI ABV UP PARAM F/U: HCPCS | Performed by: FAMILY MEDICINE

## 2022-04-25 PROCEDURE — 3044F HG A1C LEVEL LT 7.0%: CPT | Performed by: FAMILY MEDICINE

## 2022-04-25 PROCEDURE — 3017F COLORECTAL CA SCREEN DOC REV: CPT | Performed by: FAMILY MEDICINE

## 2022-04-25 PROCEDURE — 83036 HEMOGLOBIN GLYCOSYLATED A1C: CPT | Performed by: FAMILY MEDICINE

## 2022-04-25 PROCEDURE — 2022F DILAT RTA XM EVC RTNOPTHY: CPT | Performed by: FAMILY MEDICINE

## 2022-04-25 PROCEDURE — 71046 X-RAY EXAM CHEST 2 VIEWS: CPT

## 2022-04-25 PROCEDURE — G8427 DOCREV CUR MEDS BY ELIG CLIN: HCPCS | Performed by: FAMILY MEDICINE

## 2022-04-25 PROCEDURE — 1036F TOBACCO NON-USER: CPT | Performed by: FAMILY MEDICINE

## 2022-04-25 RX ORDER — ALBUTEROL SULFATE 90 UG/1
2 AEROSOL, METERED RESPIRATORY (INHALATION) 4 TIMES DAILY PRN
Qty: 54 G | Refills: 1 | Status: SHIPPED | OUTPATIENT
Start: 2022-04-25

## 2022-04-25 RX ORDER — METHYLPREDNISOLONE 4 MG/1
TABLET ORAL
Qty: 21 TABLET | Refills: 0 | Status: SHIPPED | OUTPATIENT
Start: 2022-04-25 | End: 2022-05-01

## 2022-04-25 RX ORDER — FLUTICASONE PROPIONATE 50 MCG
1 SPRAY, SUSPENSION (ML) NASAL DAILY
Qty: 16 G | Refills: 3 | Status: SHIPPED | OUTPATIENT
Start: 2022-04-25 | End: 2023-04-25

## 2022-04-25 RX ORDER — AMOXICILLIN 500 MG/1
TABLET, FILM COATED ORAL
Qty: 30 TABLET | Refills: 0 | Status: SHIPPED | OUTPATIENT
Start: 2022-04-25 | End: 2022-05-17

## 2022-04-25 ASSESSMENT — PATIENT HEALTH QUESTIONNAIRE - PHQ9
SUM OF ALL RESPONSES TO PHQ9 QUESTIONS 1 & 2: 0
1. LITTLE INTEREST OR PLEASURE IN DOING THINGS: 0
2. FEELING DOWN, DEPRESSED OR HOPELESS: 0
SUM OF ALL RESPONSES TO PHQ QUESTIONS 1-9: 0

## 2022-04-25 NOTE — PROGRESS NOTES
CC cough    HPI  62 y/o female morbid obese, diabetic (AIC 6.4), htn, hld    She has a 2 month history of cough non productive cough    No fever cough is not yellow  She has h/o asthma as a child  And did have covid pneumonia   Sept 2021  Started on combivent then but not  Working. Present day/night  Assoc with wheezes and runny nose  Congestion    Non smoker    Previous pneumonia      Physical Exam  Constitutional:       General: She is not in acute distress. Appearance: She is well-developed. She is not diaphoretic. HENT:      Head: Normocephalic and atraumatic. Right Ear: External ear normal.      Left Ear: External ear normal.      Nose: Nose normal.      Mouth/Throat:      Pharynx: No oropharyngeal exudate. Eyes:      General: No scleral icterus. Left eye: No discharge. Conjunctiva/sclera: Conjunctivae normal.      Pupils: Pupils are equal, round, and reactive to light. Neck:      Thyroid: No thyromegaly. Vascular: No JVD. Trachea: No tracheal deviation. Cardiovascular:      Rate and Rhythm: Normal rate and regular rhythm. Heart sounds: Normal heart sounds. No murmur heard. No friction rub. No gallop. Pulmonary:      Effort: Pulmonary effort is normal. No respiratory distress. Breath sounds: Normal breath sounds. No stridor. No wheezing or rales. Chest:      Chest wall: No tenderness. Abdominal:      General: Bowel sounds are normal. There is no distension. Palpations: Abdomen is soft. There is no mass. Tenderness: There is no abdominal tenderness. There is no guarding or rebound. Musculoskeletal:         General: No tenderness. Normal range of motion. Cervical back: Normal range of motion and neck supple. Lymphadenopathy:      Cervical: No cervical adenopathy. Skin:     General: Skin is warm and dry. Coloration: Skin is not pale. Findings: No erythema or rash.    Neurological:      Mental Status: She is alert and oriented to person, place, and time. Cranial Nerves: No cranial nerve deficit. Coordination: Coordination normal.      Deep Tendon Reflexes: Reflexes are normal and symmetric. Reflexes normal.   Psychiatric:         Behavior: Behavior normal.         Thought Content: Thought content normal.         Judgment: Judgment normal.       Zenaida Etienne was seen today for referral - general.    Diagnoses and all orders for this visit:    Hay fever with asthma  sxs consistent with above  Plans as below  -     XR CHEST STANDARD (2 VW); Future  -     methylPREDNISolone (MEDROL DOSEPACK) 4 MG tablet; Take 6 tabs day 1, then 1 less tab each day until gone  -     albuterol sulfate HFA (VENTOLIN HFA) 108 (90 Base) MCG/ACT inhaler; Inhale 2 puffs into the lungs 4 times daily as needed for Wheezing  -     fluticasone-salmeterol (ADVAIR HFA) 45-21 MCG/ACT inhaler; Inhale 2 puffs into the lungs 2 times daily  -     fluticasone (FLONASE) 50 MCG/ACT nasal spray; 1 spray by Nasal route daily    Cough  See above notes  -     XR CHEST STANDARD (2 VW); Future  -     methylPREDNISolone (MEDROL DOSEPACK) 4 MG tablet; Take 6 tabs day 1, then 1 less tab each day until gone  -     albuterol sulfate HFA (VENTOLIN HFA) 108 (90 Base) MCG/ACT inhaler; Inhale 2 puffs into the lungs 4 times daily as needed for Wheezing  -     fluticasone-salmeterol (ADVAIR HFA) 45-21 MCG/ACT inhaler; Inhale 2 puffs into the lungs 2 times daily  -     fluticasone (FLONASE) 50 MCG/ACT nasal spray; 1 spray by Nasal route daily  Add amoxicillin  Type 2 diabetes mellitus without complication, without long-term current use of insulin ([facility])  Glucose controlled  AIC 6.4  At goal  Should monitor glucose  As oral steroids can aggravate the  Issue   -     POCT glycosylated hemoglobin (Hb A1C)    Severe obesity (BMI 35.0-39. 9) with comorbidity ([address]    Low lexy diet  Exercise    Fit test given

## 2022-04-25 NOTE — PATIENT INSTRUCTIONS
fat-free foods have more calories than regular ones. Eatfat-free treats in moderation, as you would other foods. If your favorite foods are high in fat, salt, sugar, or calories, limit how often you eat them. Eat smaller servings, or look for healthy substitutes. Fillup on fruits, vegetables, and whole grains. Eating at home   Use meat as a side dish instead of as the main part of your meal.   Try main dishes that use whole wheat pasta, brown rice, dried beans, or vegetables.  Find ways to cook with little or no fat, such as broiling, steaming, or grilling.  Use cooking spray instead of oil. If you use oil, use a monounsaturated oil, such as canola or olive oil.  Trim fat from meats before you cook them.  Drain off fat after you brown the meat or while you roast it.  Chill soups and stews after you cook them. Then skim the fat off the top after it hardens. Eating out   Order foods that are broiled or poached rather than fried or breaded.  Cut back on the amount of butter or margarine that you use on bread.  Order sauces, gravies, and salad dressings on the side, and use only a little.  When you order pasta, choose tomato sauce rather than cream sauce.  Ask for salsa with your baked potato instead of sour cream, butter, cheese, or neal.  Order meals in a small size instead of upgrading to a large.  Share an entree, or take part of your food home to eat as another meal.   Share appetizers and desserts. Where can you learn more? Go to [email or web address] org and sign in to your [employer] account. Enter O729 in the [facility] box to learn more about \"Learning About Cutting Calories. \"     If you do not have an account, please click on the \"Sign Up Now\" link. Current as of: September 8, 2021               Content Version: 13.2  © 9883-6974 Healthwise, Incorporated. Care instructions adapted under license by [facility] ([facility]).  If you have questions about a medical condition or this instruction, always ask your healthcare professional. [place] [address] any warranty or liability for your use of this information.     Pulse oximeter  Get labs done   cxr  Scripts    Fit test given

## 2022-04-25 NOTE — LETTER
[facility]  [id number]  Phone: [phone]  Fax: [phone]    Pilar Keating MD        April 25, 2022     Patient: Yissel Paul   YOB: 1960   Date of Visit: 4/25/2022       To Whom it May Concern:    Yissel Paul was seen in my clinic on 4/25/2022. She may return to work on 4/25/2022. If you have any questions or concerns, please don't hesitate to call.     Sincerely,         Pilar Keating MD

## 2022-05-11 ENCOUNTER — TELEPHONE (OUTPATIENT)
Dept: PRIMARY CARE CLINIC | Age: 62
End: 2022-05-11

## 2022-05-11 NOTE — TELEPHONE ENCOUNTER
----- Message from Ermelinda Blas sent at 5/10/2022  3:50 PM EDT -----  Subject: Appointment Request    Reason for Call: Routine Existing Condition Follow Up    QUESTIONS  Type of Appointment? Established Patient  Reason for appointment request? No appointments available during search  Additional Information for Provider? Patient would like to have the   referral to see a throat and lung doctor. Patient is still coughing. The   coughing stops when she is taking the steroids' and antibiotics, but   continues when she does not have the medication. Patient would like to   pick the referral up before her next appt. Please call patient back when   referral is ready to be picked up.  ---------------------------------------------------------------------------  --------------  [employer]  What is the best way for the office to contact you? OK to leave message on   voicemail  Preferred Call Back Phone Number? [phone]  ---------------------------------------------------------------------------  --------------  SCRIPT ANSWERS  Relationship to Patient? Self  Is this follow up request related to routine Diabetes Management? No  Have you been diagnosed with, awaiting test results for, or told that you   are suspected of having COVID-19 (Coronavirus)? (If patient has tested   negative or was tested as a requirement for work, school, or travel and   not based on symptoms, answer no)? No  Within the past 10 days have you developed any of the following symptoms   (answer no if symptoms have been present longer than 10 days or began   more than 10 days ago)? Fever or Chills, Cough, Shortness of breath or   difficulty breathing, Loss of taste or smell, Sore throat, Nasal   congestion, Sneezing or runny nose, Fatigue or generalized body aches   (answer no if pain is specific to a body part e.g. back pain), Diarrhea,   Headache? No  Have you had close contact with someone with COVID-19 in the last 7 days?    No  (Service Expert - click yes below to proceed with [employer] As Usual   Scheduling)?  Yes

## 2022-05-16 DIAGNOSIS — R05.9 COUGH: ICD-10-CM

## 2022-05-16 DIAGNOSIS — J45.909: ICD-10-CM

## 2022-05-17 ENCOUNTER — TELEPHONE (OUTPATIENT)
Dept: PRIMARY CARE CLINIC | Age: 62
End: 2022-05-17

## 2022-05-17 RX ORDER — AMOXICILLIN 500 MG/1
CAPSULE ORAL
Qty: 30 CAPSULE | Refills: 0 | Status: SHIPPED | OUTPATIENT
Start: 2022-05-17 | End: 2022-08-22

## 2022-05-17 NOTE — TELEPHONE ENCOUNTER
----- Message from Lizzeth Peres sent at 5/16/2022  4:35 PM EDT -----  Subject: Message to Provider    QUESTIONS  Information for Provider? Urgent, sent in for a refill request for   Amoxicillin for Pastora Lamine. She would like to pick the script up today at   [facility] if possible for her cough that Dr. Ross  has seen her   for on 04/25/22. She can be reached at [phone] ok to leave a message  ---------------------------------------------------------------------------  --------------  [address]  What is the best way for the office to contact you? OK to leave message on   voicemail  Preferred Call Back Phone Number? [phone]  ---------------------------------------------------------------------------  --------------  SCRIPT ANSWERS  Relationship to Patient?  Self

## 2022-05-25 ENCOUNTER — OFFICE VISIT (OUTPATIENT)
Dept: PULMONOLOGY | Age: 62
End: 2022-05-25
Payer: MEDICARE

## 2022-05-25 VITALS
HEART RATE: 76 BPM | DIASTOLIC BLOOD PRESSURE: 82 MMHG | SYSTOLIC BLOOD PRESSURE: 136 MMHG | OXYGEN SATURATION: 96 % | WEIGHT: 201 LBS | BODY MASS INDEX: 37.98 KG/M2

## 2022-05-25 DIAGNOSIS — J30.2 SEASONAL ALLERGIC RHINITIS, UNSPECIFIED TRIGGER: ICD-10-CM

## 2022-05-25 DIAGNOSIS — R06.02 SOB (SHORTNESS OF BREATH): Primary | ICD-10-CM

## 2022-05-25 DIAGNOSIS — J45.40 MODERATE PERSISTENT ASTHMA WITHOUT COMPLICATION: ICD-10-CM

## 2022-05-25 PROCEDURE — 1036F TOBACCO NON-USER: CPT | Performed by: INTERNAL MEDICINE

## 2022-05-25 PROCEDURE — 99204 OFFICE O/P NEW MOD 45 MIN: CPT | Performed by: INTERNAL MEDICINE

## 2022-05-25 PROCEDURE — G8417 CALC BMI ABV UP PARAM F/U: HCPCS | Performed by: INTERNAL MEDICINE

## 2022-05-25 PROCEDURE — 3017F COLORECTAL CA SCREEN DOC REV: CPT | Performed by: INTERNAL MEDICINE

## 2022-05-25 PROCEDURE — G8427 DOCREV CUR MEDS BY ELIG CLIN: HCPCS | Performed by: INTERNAL MEDICINE

## 2022-05-25 RX ORDER — MONTELUKAST SODIUM 10 MG/1
10 TABLET ORAL DAILY
Qty: 30 TABLET | Refills: 3 | Status: SHIPPED | OUTPATIENT
Start: 2022-05-25 | End: 2022-08-24 | Stop reason: SDUPTHER

## 2022-05-25 NOTE — PROGRESS NOTES
PULMONARY OFFICE NEW PATIENT VISIT    CONSULTING PHYSICIAN:      REASON FOR VISIT:   Chief Complaint   Patient presents with    Cough       DATE OF VISIT: 5/25/2022    HISTORY OF PRESENT ILLNESS: 63y.o. year old female with past medical history of asthma as a child, allergic rhinitis, hypertension, diabetes mellitus type 2 who is here for evaluation of cough and shortness of breath. Patient has been complaining of cough which is productive of clear phlegm for the last 3 months. This is also associated with some shortness of breath on exertion. Cough wakes her up from sleep at night. Patient states that she is coughing all the time and has to drink water all the time to suppress the cough. She also complains of some wheezing. She denies any chest pain or hemoptysis. Patient has uncontrolled allergies with sinus congestion and postnasal drip and runny nose. She denies acid reflux. Patient had asthma as a child. She outgrew the asthma and was not taking any medications for asthma. She had COVID in October 2021. For the symptoms, her primary care physician has prescribed patient 2 courses of amoxicillin. She is on the second course amoxicillin without any relief in symptoms. Patient has smoking history of 13 pack years. She quit smoking in 2009. DIAGNOSTIC TEST REVIEWED:  I reviewed the chest x-ray from 4/25/2022 and my interpretation is as follows. No infiltrates noted. No abnormal lung nodules noted. REVIEW OF SYSTEMS:   CONSTITUTIONAL SYMPTOMS: The patient denies fever, fatigue, night sweats, weight loss or weight gain. HEENT: No vision changes. No tinnitus, Denies sinus pain. No hoarseness, or dysphagia. NECK: Patient denies swelling in the neck. CARDIOVASCULAR: Denies chest pain, palpitation, syncope. RESPIRATORY: See above  GASTROINTESTINAL: Denies nausea, abdominal pain or change in bowel function. GENITOURINARY: Denies obstructive symptoms.  No history of albuterol sulfate HFA (VENTOLIN HFA) 108 (90 Base) MCG/ACT inhaler Inhale 2 puffs into the lungs 4 times daily as needed for Wheezing 54 g 1    fluticasone (FLONASE) 50 MCG/ACT nasal spray 1 spray by Nasal route daily 16 g 3    Misc. Devices (FINGERTIP PULSE OXIMETER) MISC 1 each by Does not apply route as needed (asthma) 1 each 0    amLODIPine (NORVASC) 10 MG tablet Take 1 tablet by mouth daily 90 tablet 2    hydroCHLOROthiazide (HYDRODIURIL) 25 MG tablet Take 1 tablet by mouth daily 90 tablet 5    losartan (COZAAR) 50 MG tablet Take 1 tablet by mouth daily 90 tablet 1    atorvastatin (LIPITOR) 40 MG tablet Take 1 tablet by mouth daily 90 tablet 1    vitamin D (ERGOCALCIFEROL) 1.25 MG (86589 UT) CAPS capsule Take 1 capsule by mouth once a week 4 capsule 2    blood glucose test strips (ACCU-CHEK GUIDE) strip 1 each by In Vitro route daily 100 each 1    tiZANidine (ZANAFLEX) 4 MG tablet TAKE ONE TABLET BY MOUTH FOUR TIMES A DAY AS NEEDED FOR SPASM 40 tablet 0    Blood Glucose Monitoring Suppl (ACCU-CHEK GUIDE ME) w/Device KIT CHECK BLOOD SUGAR DAILY 1 kit 0    Lancets MISC 1 each by Does not apply route daily 100 each 1    albuterol-ipratropium (COMBIVENT RESPIMAT)  MCG/ACT AERS inhaler Inhale 1 puff into the lungs every 6 hours as needed for Wheezing 1 each 0    aspirin 81 MG chewable tablet Take 81 mg by mouth daily       Blood Pressure KIT Ck blood pressure once a week 1 kit 0    acetaminophen (TYLENOL) 500 MG tablet Take 1,000 mg by mouth every 4 hours as needed for Pain       No current facility-administered medications on file prior to visit. ALLERGIES:   Allergies as of 05/25/2022    (No Known Allergies)      OBJECTIVE:   weight is 201 lb (91.2 kg). Her blood pressure is 136/82 and her pulse is 76. Her oxygen saturation is 96%. PHYSICAL EXAM:    CONSTITUTIONAL: She is a 63y.o.-year-old who appears her stated age. She is alert and oriented x 3 and in no acute distress. HEENT: PERRL. No scleral icterus. No thrush, atraumatic, normocephalic. NECK: Supple, without cervical or supraclavicular lymphadenopathy:  CARDIOVASCULAR: S1 S2 RRR. Without murmer  RESPIRATORY & CHEST: Lungs are clear to auscultation and percussion. No wheezing, no crackles. Good air movement  GASTROINTESTINAL & ABDOMEN: Soft, nontender, positive bowel sounds in all quadrants, non-distended, without hepatosplenomegaly. GENITOURINARY: Deferred. MUSCULOSKELETAL: No tenderness to palpation of the axial skeleton. There is no clubbing. No cyanosis. No edema of the lower extremities. SKIN OF BODY: No rash or jaundice. PSYCHIATRIC EVALUATION: Normal affect. Patient answers questions appropriately. HEMATOLOGIC/LYMPHATIC/ IMMUNOLOGIC: No palpable lymphadenopathy. NEUROLOGIC: Alert and oriented x 3. Groslly non-focal. Motor strength is 5+/5 in all muscle groups. The patient has a normal sensorium globally. LABS:  Lab Results   Component Value Date    WBC 13.9 (H) 10/06/2021    HGB 14.2 10/06/2021    HCT 44.5 10/06/2021     10/06/2021    CHOL 190 02/14/2018    TRIG 157 (H) 02/14/2018    HDL 53 06/25/2021    ALT 14 10/06/2021    AST 14 (L) 10/06/2021     10/06/2021    K 4.6 10/06/2021     10/06/2021    CREATININE 0.8 10/06/2021    BUN 20 10/06/2021    CO2 18 (L) 10/06/2021    TSH 0.93 04/11/2014    INR 0.97 01/29/2020       Lab Results   Component Value Date    GLUCOSE 252 (H) 10/06/2021    CALCIUM 9.1 10/06/2021     10/06/2021    K 4.6 10/06/2021    CO2 18 (L) 10/06/2021     10/06/2021    BUN 20 10/06/2021    CREATININE 0.8 10/06/2021           ASSESSMENT AND PLAN:     1. SOB (shortness of breath)  Patient's shortness of breath and cough is likely secondary to uncontrolled asthma. Chest x-ray did not show any abnormal lung findings.   Patient and her spouse are requesting to get a CT chest to rule out any abnormal lung nodules/cancer contributing to patient's symptoms  - CT CHEST WO CONTRAST; Future    2. Seasonal allergic rhinitis, unspecified trigger  Patient has uncontrolled allergies. I have advised her to continue taking Flonase nasal spray daily. Start Singulair.  - montelukast (SINGULAIR) 10 MG tablet; Take 1 tablet by mouth daily  Dispense: 30 tablet; Refill: 3    3. Moderate persistent asthma without complication  Symptoms of asthma are under control. I would increase the dose of Advair to 230/21 twice daily. She will continue take albuterol inhaler as needed. - Full PFT Study With Bronchodilator; Future        Return in about 3 months (around 8/25/2022). Raysa Edmonds MD  Pulmonary Critical Care and Sleep Medicine  Electronically signed by Raysa Edmonds MD on 5/25/2022 at 10:33 AM     This note was completed using dragon medical speech recognition software. Grammatical errors, random word insertions, pronoun errors and incomplete sentences are occasional consequences of this technology due to software limitations. If there are questions or concerns about the content of this note of information contained within the body of this dictation they should be addressed with the provider for clarification.

## 2022-06-08 ENCOUNTER — HOSPITAL ENCOUNTER (OUTPATIENT)
Dept: PULMONOLOGY | Age: 62
Discharge: HOME | End: 2022-06-08
Payer: MEDICARE

## 2022-06-08 ENCOUNTER — HOSPITAL ENCOUNTER (OUTPATIENT)
Dept: CT IMAGING | Age: 62
Discharge: HOME | End: 2022-06-08
Payer: MEDICARE

## 2022-06-08 VITALS — OXYGEN SATURATION: 100 %

## 2022-06-08 DIAGNOSIS — J45.40 MODERATE PERSISTENT ASTHMA WITHOUT COMPLICATION: ICD-10-CM

## 2022-06-08 DIAGNOSIS — R06.02 SOB (SHORTNESS OF BREATH): ICD-10-CM

## 2022-06-08 PROCEDURE — 94060 EVALUATION OF WHEEZING: CPT

## 2022-06-08 PROCEDURE — 94760 N-INVAS EAR/PLS OXIMETRY 1: CPT

## 2022-06-08 PROCEDURE — 94664 DEMO&/EVAL PT USE INHALER: CPT

## 2022-06-08 PROCEDURE — 94729 DIFFUSING CAPACITY: CPT

## 2022-06-08 PROCEDURE — 6360000002 HC RX W HCPCS: Performed by: INTERNAL MEDICINE

## 2022-06-08 PROCEDURE — 71250 CT THORAX DX C-: CPT

## 2022-06-08 PROCEDURE — 94726 PLETHYSMOGRAPHY LUNG VOLUMES: CPT

## 2022-06-08 RX ORDER — ALBUTEROL SULFATE 2.5 MG/3ML
2.5 SOLUTION RESPIRATORY (INHALATION) ONCE
Status: COMPLETED | OUTPATIENT
Start: 2022-06-08 | End: 2022-06-08

## 2022-06-08 RX ADMIN — ALBUTEROL SULFATE 2.5 MG: 2.5 SOLUTION RESPIRATORY (INHALATION) at 12:34

## 2022-06-09 NOTE — PROCEDURES
[facility] [address], [address]                               PULMONARY FUNCTION    PATIENT NAME: Halley Le                      :        1960  MED REC NO:   [id number]                          ROOM:  ACCOUNT NO:   [de-identified]                           ADMIT DATE: 2022  PROVIDER:     Radha Elliott MD    DATE OF PROCEDURE:  2022    INDICATION:  Moderate persistent asthma without complication. BMI:  34.5. TOBACCO HISTORY:  The patient smoked 0.8 packs of cigarettes a day for  25 years, but quit 16 years ago. Spirometry data is acceptable and reproducible. Albuterol given per protocol. Lung volumes performed via plethysmography. Diffusion capacity not adjusted for hemoglobin. Room air oxygen saturation is 100%. SPIROMETRY:  FEV1 to FVC ratio is 82%. Prebronchodilator FEV1 is 1.72,  which is 84% of predicted. Prebronchodilator FVC is 2.11, which is 81%  of predicted. Lung volumes show total lung capacity of 3.97, which is 79% of  predicted. Residual volume is 2.04, which is 104% of predicted. Diffusion capacity is 20.14, which is 82% of predicted. IMPRESSION:  1. Normal spirometry. 2.  There is no significant response to bronchodilators. 3.  Moderate restrictive defect is present. 4.  Normal diffusion capacity.         Marcia Darby MD    D: 2022 11:06:54       T: 2022 15:09:34     [email or web address]  Job#: [id number]     Doc#: [id number]    CC:

## 2022-06-21 ENCOUNTER — HOSPITAL ENCOUNTER (OUTPATIENT)
Dept: WOMENS IMAGING | Age: 62
Discharge: HOME | End: 2022-06-21
Payer: MEDICARE

## 2022-06-21 DIAGNOSIS — Z12.31 ENCOUNTER FOR SCREENING MAMMOGRAM FOR MALIGNANT NEOPLASM OF BREAST: ICD-10-CM

## 2022-06-21 DIAGNOSIS — Z01.419 WELL WOMAN EXAM WITH ROUTINE GYNECOLOGICAL EXAM: ICD-10-CM

## 2022-06-21 PROCEDURE — 77063 BREAST TOMOSYNTHESIS BI: CPT

## 2022-07-11 ENCOUNTER — TELEPHONE (OUTPATIENT)
Dept: PHARMACY | Facility: CLINIC | Age: 62
End: 2022-07-11

## 2022-07-11 NOTE — TELEPHONE ENCOUNTER
[facility] CLINICAL PHARMACY: ADHERENCE REVIEW  Identified care gap per [employer]: fills at [facility]: ACE/ARB and Statin adherence    Last Visit: 04.25.22    Patient identified as LIS = 1, therefore patient may be able to receive a 90-day supply for the same cost as a 30-day supply        ASSESSMENT  ACE/ARB ADHERENCE    Insurance Records claims through 06.14.22 (Prior Year PDC = PASSED; YTD PDC = 98%; Potential Fail Date: 08.19.22):   Losartan last filled on 02.23.22 for 90 day supply. Next refill due: 05.24.22    Per Reconciled Dispense Report:  (same as above). Patient not located in [employer]. BP Readings from Last 3 Encounters:   05/25/22 136/82   04/25/22 125/85   04/12/22 134/78     CrCl cannot be calculated (Patient's most recent lab result is older than the maximum 180 days allowed. ). [address] Records claims through 06.14.22 (Prior Year [place] = PASSED; YTD [place] = 74%; Potential Fail Date: 07.13.22): Atorvastatin last filled on 01.24.22 for 90 day supply. Next refill due: 04.24.22    Per Reconciled Dispense Report:  (same as above). Patient not located in [employer]. Lab Results   Component Value Date    CHOL 190 02/14/2018    TRIG 157 (H) 02/14/2018    HDL 53 06/25/2021    LDLCALC 104 (H) 06/25/2021     ALT   Date Value Ref Range Status   10/06/2021 14 10 - 40 U/L Final     AST   Date Value Ref Range Status   10/06/2021 14 (L) 15 - 37 U/L Final     Comment:     Specimen hemolysis has exceeded the interference as defined by Roche. Value may be falsely increased. Suggest recollection if clinically  indicated.        The 10-year ASCVD risk score (Rc Russo., et al., 2013) is: 18.5%    Values used to calculate the score:      Age: 63 years      Sex: Female      Is Non- : Yes      Diabetic: Yes      Tobacco smoker: No      Systolic Blood Pressure: 858 mmHg      Is BP treated: Yes      HDL Cholesterol: 53 mg/dL      Total Cholesterol: 198 mg/dL     PLAN  The following are interventions that have been identified:   - Patient overdue refilling Losartan and Atorvastatin and active on home medication list.     Attempting to reach patient to review.  Left message asking for return call.          Future Appointments   Date Time Provider Boris Mathur   8/24/2022 10:00 AM Gume Woodruff MD [facility]   4/13/2023 10:00 AM Sonia Lancaster MD [facility]       [address] [facility]  Phone: toll free [phone]

## 2022-08-04 NOTE — PROGRESS NOTES
08/04/22 1000   Rec Therapy Time Calculation   Date of Treatment 08/04/22   Rec Start Time 1000   Rec Stop Time 1030   Rec Total Time (min) 30 min   Time   Treatment time 2 units   Precautions   General Precautions fall   Orthopedic Precautions  spinal precautions   Pain/Comfort   Pain Rating 1 4/10   Location 1 neck   OTHER   Rehab identified problem list/impairments weakness;impaired balance;decreased upper extremity function;decreased ROM;impaired coordination   Values/Beliefs/Spiritual Care   Spiritual, Cultural Beliefs, Denominational Practices, Values that Affect Care no   Overall Level of Functioning   Activity Tolerance Independent   Right UE Coodination/Dexterity Independent   Left UE Coordination/Dexterity Mod Indep   Problem Solving/Sequencing Skills Independent   Memory Recall Independent   Attention Span Independent   Recreational Therapy Short Term Goals   Short Term Goal 1 Progression Met   Short Term Goal 2 Progression Met   Recreational Therapy Long Term Goals   Long Term Goal 1 Progression Met   Long Term Goal 2 Progression Met   Plan   Patient to be seen Daily   Planned Duration Other (Comment)  (1 day)   Treatments Planned Energy conservation training   Treatment plan/goals estblished with Patient/Caregiver Yes      placement   Bed mobility  Supine to Sit: Stand by assistance(HOB elevated, side rail and increased time)  Sit to Supine: Unable to assess(up in chair at end of session)  Transfers  Sit to stand: Stand by assistance(cues for hand placement)  Stand to sit: Stand by assistance(cues for hand placement)  Transfer Comments: Transfer to reclienr chair with SBA with cues for hand placement         Cognition  Overall Cognitive Status: [facility]        Assessment   Performance deficits / Impairments: Decreased functional mobility ; Decreased strength;Decreased endurance;Decreased ADL status; Decreased balance  Assessment: Discussed with OTR am pac score is 21. Anticipate that patient will be safe for home with family support and home OT. Patient completed lower body dressing with Min A. Stood to complete ADL tasks with SBA. Functional mobility with RW with SBA. Transfer to recliner chair with SBA with cues for hand placement. Plan is for discharge to home today. OT Education: OT Role;ADL Adaptive Strategies; Plan of Care;Transfer Training;Precautions  Patient Education: Ice therapy, jassi hose and importance of mobility   REQUIRES OT FOLLOW UP: Yes  Activity Tolerance  Activity Tolerance: Patient Tolerated treatment well  Safety Devices  Safety Devices in place: Not Applicable(with PT)        Plan   Plan  Times per week: Plan for discharge today  Current Treatment Recommendations: Strengthening, Functional Mobility Training, Endurance Training, Balance Training, Safety Education & Training, Equipment Evaluation, Education, & procurement, Patient/Caregiver Education & Training, Self-Care / ADL    [facility] Score        [facility] Inpatient Daily Activity Raw Score: 21 (02/13/20 1131)  AM-[facility] Inpatient ADL T-Scale Score : 44.27 (02/13/20 1131)  ADL Inpatient CMS 0-100% Score: 32.79 (02/13/20 1131)  ADL Inpatient CMS G-Code Modifier : Joseline Washington (02/13/20 1131)    Goals  Short term goals  Time Frame for Short term goals: prior to d/c: All goals met

## 2022-08-22 DIAGNOSIS — J45.909: ICD-10-CM

## 2022-08-22 DIAGNOSIS — R05.9 COUGH: ICD-10-CM

## 2022-08-22 RX ORDER — AMOXICILLIN 500 MG/1
CAPSULE ORAL
Qty: 30 CAPSULE | Refills: 0 | Status: SHIPPED | OUTPATIENT
Start: 2022-08-22 | End: 2022-09-09

## 2022-08-24 ENCOUNTER — HOSPITAL ENCOUNTER (OUTPATIENT)
Age: 62
Discharge: HOME | End: 2022-08-24
Payer: MEDICARE

## 2022-08-24 ENCOUNTER — OFFICE VISIT (OUTPATIENT)
Dept: PULMONOLOGY | Age: 62
End: 2022-08-24
Payer: MEDICARE

## 2022-08-24 VITALS
DIASTOLIC BLOOD PRESSURE: 76 MMHG | WEIGHT: 198 LBS | SYSTOLIC BLOOD PRESSURE: 132 MMHG | HEART RATE: 67 BPM | BODY MASS INDEX: 37.41 KG/M2 | OXYGEN SATURATION: 98 %

## 2022-08-24 DIAGNOSIS — R91.8 MULTIPLE LUNG NODULES: ICD-10-CM

## 2022-08-24 DIAGNOSIS — J30.2 SEASONAL ALLERGIC RHINITIS, UNSPECIFIED TRIGGER: ICD-10-CM

## 2022-08-24 DIAGNOSIS — J45.40 MODERATE PERSISTENT ASTHMA WITHOUT COMPLICATION: Primary | ICD-10-CM

## 2022-08-24 LAB
BASOPHILS ABSOLUTE: 0 K/UL (ref 0–0.2)
BASOPHILS RELATIVE PERCENT: 0.4 %
EOSINOPHILS ABSOLUTE: 0.2 K/UL (ref 0–0.6)
EOSINOPHILS RELATIVE PERCENT: 3.1 %
HCT VFR BLD CALC: 40.8 % (ref 36–48)
HEMOGLOBIN: 12.8 G/DL (ref 12–16)
LYMPHOCYTES ABSOLUTE: 1.9 K/UL (ref 1–5.1)
LYMPHOCYTES RELATIVE PERCENT: 26.6 %
MCH RBC QN AUTO: 26.3 PG (ref 26–34)
MCHC RBC AUTO-ENTMCNC: 31.4 G/DL (ref 31–36)
MCV RBC AUTO: 83.7 FL (ref 80–100)
MONOCYTES ABSOLUTE: 0.5 K/UL (ref 0–1.3)
MONOCYTES RELATIVE PERCENT: 6.8 %
NEUTROPHILS ABSOLUTE: 4.6 K/UL (ref 1.7–7.7)
NEUTROPHILS RELATIVE PERCENT: 63.1 %
PDW BLD-RTO: 16 % (ref 12.4–15.4)
PLATELET # BLD: 215 K/UL (ref 135–450)
PMV BLD AUTO: 9.6 FL (ref 5–10.5)
RBC # BLD: 4.88 M/UL (ref 4–5.2)
WBC # BLD: 7.3 K/UL (ref 4–11)

## 2022-08-24 PROCEDURE — 85025 COMPLETE CBC W/AUTO DIFF WBC: CPT

## 2022-08-24 PROCEDURE — 82785 ASSAY OF IGE: CPT

## 2022-08-24 PROCEDURE — 36415 COLL VENOUS BLD VENIPUNCTURE: CPT

## 2022-08-24 PROCEDURE — G8427 DOCREV CUR MEDS BY ELIG CLIN: HCPCS | Performed by: INTERNAL MEDICINE

## 2022-08-24 PROCEDURE — G8417 CALC BMI ABV UP PARAM F/U: HCPCS | Performed by: INTERNAL MEDICINE

## 2022-08-24 PROCEDURE — 3017F COLORECTAL CA SCREEN DOC REV: CPT | Performed by: INTERNAL MEDICINE

## 2022-08-24 PROCEDURE — 1036F TOBACCO NON-USER: CPT | Performed by: INTERNAL MEDICINE

## 2022-08-24 PROCEDURE — 99214 OFFICE O/P EST MOD 30 MIN: CPT | Performed by: INTERNAL MEDICINE

## 2022-08-24 PROCEDURE — 86003 ALLG SPEC IGE CRUDE XTRC EA: CPT

## 2022-08-24 RX ORDER — LORATADINE 10 MG/1
10 TABLET ORAL DAILY
Qty: 90 TABLET | Refills: 3 | Status: SHIPPED | OUTPATIENT
Start: 2022-08-24

## 2022-08-24 RX ORDER — MONTELUKAST SODIUM 10 MG/1
10 TABLET ORAL DAILY
Qty: 90 TABLET | Refills: 3 | Status: SHIPPED | OUTPATIENT
Start: 2022-08-24

## 2022-08-24 NOTE — PROGRESS NOTES
PULMONARY OFFICE FOLLOW UP NOTE    REASON FOR VISIT:   Chief Complaint   Patient presents with    Follow-up    Results       DATE OF VISIT: 8/24/2022    HISTORY OF PRESENT ILLNESS: 63y.o. year old female is here for follow-up of asthma and allergic rhinitis. She has past medical history of hypertension and diabetes mellitus type 2. Patient's bronchodilator regimen consist of Advair 230/21 which she takes once a day. She takes Singulair every day and Flonase as needed. She states that she continues to have dyspnea on exertion as well as cough. She also has constant postnasal drip with itchy eyes. Symptoms are not under control. PFT was within normal limits. Patient has smoking history of 13 pack years. She quit smoking in 2009. Previously:   Patient has been complaining of cough which is productive of clear phlegm for the last 3 months. This is also associated with some shortness of breath on exertion. Cough wakes her up from sleep at night. Patient states that she is coughing all the time and has to drink water all the time to suppress the cough. She also complains of some wheezing. She denies any chest pain or hemoptysis. Patient has uncontrolled allergies with sinus congestion and postnasal drip and runny nose. She denies acid reflux. Patient had asthma as a child. She outgrew the asthma and was not taking any medications for asthma. She had COVID in October 2021. For the symptoms, her primary care physician has prescribed patient 2 courses of amoxicillin. She is on the second course amoxicillin without any relief in symptoms. DIAGNOSTIC TEST REVIEWED:  I reviewed the chest x-ray from 4/25/2022 and my interpretation is as follows. No infiltrates noted. No abnormal lung nodules noted. I reviewed the PFT from 6/8/2022 and my interpretation is as follows.   Normal pulmonary function test.  FEV1/FVC 83  FEV1 84%, 1.73 L  FVC 80%, 2.13 L  TLC 79%, 3.97 L  DLCO 82%    REVIEW OF SYSTEMS:   CONSTITUTIONAL SYMPTOMS: The patient denies fever, fatigue, night sweats, weight loss or weight gain. HEENT: No vision changes. No tinnitus, Denies sinus pain. No hoarseness, or dysphagia. NECK: Patient denies swelling in the neck. CARDIOVASCULAR: Denies chest pain, palpitation, syncope. RESPIRATORY: See above. GASTROINTESTINAL: Denies nausea, abdominal pain or change in bowel function. GENITOURINARY: Denies obstructive symptoms. No history of incontinence. BREASTS: No masses or lumps in the breasts. SKIN: No rashes or itching. MUSCULOSKELETAL: Denies weakness or bone pain. NEUROLOGICAL: No headaches or seizures. PSYCHIATRIC: Denies mood swings or depression. ENDOCRINE: Denies heat or cold intolerance or excessive thirst.  HEMATOLOGIC/LYMPHATIC: Denies easy bruising or lymph node swelling. ALLERGIC/IMMUNOLOGIC: No environmental allergies.     PAST MEDICAL HISTORY:   Past Medical History:   Diagnosis Date    Allergic rhinitis     Arthritis     Asthma     not currently medically treated    Essential hypertension 2018    Hyperlipidemia     Neuropathy     Osteoarthritis     Type 2 diabetes mellitus without complication, without long-term current use of insulin ([address] 2019    Wears glasses        PAST SURGICAL HISTORY:   Past Surgical History:   Procedure Laterality Date    CARPAL TUNNEL RELEASE Right 7-10-14    FOOT SURGERY Right     rods placed    KNEE ARTHROSCOPY Right     MENISCECTOMY Right     TOTAL KNEE ARTHROPLASTY Right 2020    RIGHT TOTAL KNEE REPLACEMENT ROBOTIC ASSISTED performed by Merlin Krishnamurthy MD at [address]:   Social History     Tobacco Use    Smoking status: Former     Packs/day: 1.50     Years: 9.00     Pack years: 13.50     Types: Cigarettes     Quit date: 2009     Years since quittin.1    Smokeless tobacco: Never    Tobacco comments:     quit:  7 years ago   Vaping Use    Vaping Use: Never used   Substance Use Topics Alcohol use: Yes     Comment: rare    Drug use: Never       FAMILY HISTORY:   Family History   Problem Relation Age of Onset    Cancer Mother         pancreatic    Cirrhosis Father     High Blood Pressure Father        MEDICATIONS:     Current Outpatient Medications on File Prior to Visit   Medication Sig Dispense Refill    amoxicillin (AMOXIL) 500 MG capsule TAKE ONE CAPSULE BY MOUTH THREE TIMES A DAY FOR 10 DAYS 30 capsule 0    fluticasone-salmeterol (ADVAIR HFA) 230-21 MCG/ACT inhaler Inhale 2 puffs into the lungs 2 times daily 12 g 3    albuterol sulfate HFA (VENTOLIN HFA) 108 (90 Base) MCG/ACT inhaler Inhale 2 puffs into the lungs 4 times daily as needed for Wheezing 54 g 1    fluticasone (FLONASE) 50 MCG/ACT nasal spray 1 spray by Nasal route daily 16 g 3    Misc.  Devices (FINGERTIP PULSE OXIMETER) MISC 1 each by Does not apply route as needed (asthma) 1 each 0    amLODIPine (NORVASC) 10 MG tablet Take 1 tablet by mouth daily 90 tablet 2    hydroCHLOROthiazide (HYDRODIURIL) 25 MG tablet Take 1 tablet by mouth daily 90 tablet 5    losartan (COZAAR) 50 MG tablet Take 1 tablet by mouth daily 90 tablet 1    atorvastatin (LIPITOR) 40 MG tablet Take 1 tablet by mouth daily 90 tablet 1    vitamin D (ERGOCALCIFEROL) 1.25 MG (23298 UT) CAPS capsule Take 1 capsule by mouth once a week 4 capsule 2    blood glucose test strips (ACCU-CHEK GUIDE) strip 1 each by In Vitro route daily 100 each 1    tiZANidine (ZANAFLEX) 4 MG tablet TAKE ONE TABLET BY MOUTH FOUR TIMES A DAY AS NEEDED FOR SPASM 40 tablet 0    Blood Glucose Monitoring Suppl (ACCU-CHEK GUIDE ME) w/Device KIT CHECK BLOOD SUGAR DAILY 1 kit 0    Lancets MISC 1 each by Does not apply route daily 100 each 1    albuterol-ipratropium (COMBIVENT RESPIMAT)  MCG/ACT AERS inhaler Inhale 1 puff into the lungs every 6 hours as needed for Wheezing 1 each 0    aspirin 81 MG chewable tablet Take 81 mg by mouth daily       Blood Pressure KIT Ck blood pressure once a week 1 kit 0    acetaminophen (TYLENOL) 500 MG tablet Take 1,000 mg by mouth every 4 hours as needed for Pain       No current facility-administered medications on file prior to visit. ALLERGIES:   Allergies as of 08/24/2022    (No Known Allergies)      OBJECTIVE:   weight is 198 lb (89.8 kg). Her blood pressure is 132/76 and her pulse is 67. Her oxygen saturation is 98%. PHYSICAL EXAM:    CONSTITUTIONAL: She is a 63y.o.-year-old who appears her stated age. She is alert and oriented x 3 and in no acute distress. HEENT: PERRL. No scleral icterus. No thrush, atraumatic, normocephalic. NECK: Supple, without cervical or supraclavicular lymphadenopathy:  CARDIOVASCULAR: S1 S2 RRR. Without murmer  RESPIRATORY & CHEST: Lungs are clear to auscultation and percussion. No wheezing, no crackles. Good air movement  GASTROINTESTINAL & ABDOMEN: Soft, nontender, positive bowel sounds in all quadrants, non-distended, without hepatosplenomegaly. GENITOURINARY: Deferred. MUSCULOSKELETAL: No tenderness to palpation of the axial skeleton. There is no clubbing. No cyanosis. No edema of the lower extremities. SKIN OF BODY: No rash or jaundice. PSYCHIATRIC EVALUATION: Normal affect. Patient answers questions appropriately. HEMATOLOGIC/LYMPHATIC/ IMMUNOLOGIC: No palpable lymphadenopathy. NEUROLOGIC: Alert and oriented x 3. Groslly non-focal. Motor strength is 5+/5 in all muscle groups. The patient has a normal sensorium globally. ASSESSMENT AND PLAN:     1. Moderate persistent asthma without complication  Symptoms are not under control. Patient is only taking Advair once a day. I have advised her to start taking Advair 230/21, 2 puffs twice daily with spacer. She will take albuterol inhaler as needed. PFTs within normal limits. 2. Seasonal allergic rhinitis, unspecified trigger  Uncontrolled allergies. Continue Singulair 10 mg daily.   I have advised her to start taking Flonase nasal spray every day.  Add Claritin once a day. Obtain allergy testing.  - IgE; Future  - Allergen, Respiratory, Region 5 Panel; Future  - CBC with Auto Differential; Future  - montelukast (SINGULAIR) 10 MG tablet; Take 1 tablet by mouth daily  Dispense: 90 tablet; Refill: 3    3. Multiple lung nodules  Noted to have multiple pulmonary nodules on CT chest, 5 mm left upper lobe, 6 mm right upper lobe, 8 mm left major fissure. Repeat CT in 4 months.  - CT CHEST WO CONTRAST; Future        Return in about 4 months (around 12/24/2022). Sherman Craven MD  Pulmonary Critical Care and Sleep Medicine  Electronically signed by Sherman Craven MD on 8/24/2022 at 10:04 AM     This note was completed using dragon medical speech recognition software. Grammatical errors, random word insertions, pronoun errors and incomplete sentences are occasional consequences of this technology due to software limitations. If there are questions or concerns about the content of this note of information contained within the body of this dictation they should be addressed with the provider for clarification.

## 2022-08-25 ENCOUNTER — TELEPHONE (OUTPATIENT)
Dept: PULMONOLOGY | Age: 62
End: 2022-08-25

## 2022-08-25 NOTE — TELEPHONE ENCOUNTER
Patient called and said that she tried to go to the pharmacy this morning to  her prescription for her allergy medication she said singular and said that the pharmacy never got it   [address]: [phone]

## 2022-08-26 LAB — IGE: 485 KU/L

## 2022-08-29 LAB
2000687N OAK TREE IGE: <0.1 KU/L (ref 0–0.34)
ALLERGEN BERMUDA GRASS IGE: 1.2 KU/L (ref 0–0.34)
ALLERGEN BIRCH IGE: <0.1 KU/L (ref 0–0.34)
ALLERGEN DOG DANDER IGE: 26.6 KU/L (ref 0–0.34)
ALLERGEN GERMAN COCKROACH IGE: 2.14 KU/L (ref 0–0.34)
ALLERGEN HORMODENDRUM IGE: 0.88 KUL/L (ref 0–0.34)
ALLERGEN MOUSE EPITHELIA IGE: 0.26 KU/L (ref 0–0.34)
ALLERGEN PECAN TREE IGE: 0.13 KU/L (ref 0–0.34)
ALLERGEN PIGWEED ROUGH IGE: 0.19 KU/L (ref 0–0.34)
ALLERGEN SHEEP SORREL (W18) IGE: 0.14 KU/L (ref 0–0.34)
ALLERGEN TREE SYCAMORE: <0.1 KU/L (ref 0–0.34)
ALLERGEN WALNUT TREE IGE: 0.13 KU/L (ref 0–0.34)
ALLERGEN WHITE MULBERRY TREE, IGE: <0.1 KU/L (ref 0–0.34)
ALLERGEN, TREE, WHITE ASH IGE: 0.33 KU/L (ref 0–0.34)
ALTERNARIA ALTERNATA: 1.28 KU/L (ref 0–0.34)
ASPERGILLUS FUMIGATUS: 2.65 KU/L (ref 0–0.34)
CAT DANDER ANTIBODY: 1.42 KU/L (ref 0–0.34)
COTTONWOOD TREE: 0.19 KU/L (ref 0–0.34)
D. FARINAE: 2.56 KU/L (ref 0–0.34)
D. PTERONYSSINUS: 2.7 KU/L (ref 0–0.34)
ELM TREE: <0.1 KU/L (ref 0–0.34)
IGE: 473 IU/ML
MAPLE/BOXELDER TREE: <0.1 KU/L (ref 0–0.34)
MOUNTAIN CEDAR TREE: 0.2 KU/L (ref 0–0.34)
MUCOR RACEMOSUS: <0.1 KU/L (ref 0–0.34)
P. NOTATUM: 0.25 KU/L (ref 0–0.34)
RUSSIAN THISTLE: 0.25 KU/L (ref 0–0.34)
SHORT RAGWD(A ARTEMIS.) IGE: 1.28 KU/L (ref 0–0.34)
TIMOTHY GRASS: 0.18 KU/L (ref 0–0.34)

## 2022-08-31 ENCOUNTER — TELEPHONE (OUTPATIENT)
Dept: PULMONOLOGY | Age: 62
End: 2022-08-31

## 2022-08-31 NOTE — TELEPHONE ENCOUNTER
Blood test most done to look for allergies. Patient has elevated IgE and multiple allergies to multiple allergens including grass, cockroach, dust mites, fungus, cats and dogs. She is highly allergic to dogs. If patient's symptoms are not controlled with the current regimen that she is on, then we can see if she would like to be started on Xolair. Also make sure the patient is not in contact with dogs.

## 2022-08-31 NOTE — TELEPHONE ENCOUNTER
Noted. Talk to  the pt and let her know a prescription order  would be faxed to the infusion center for [address]

## 2022-08-31 NOTE — TELEPHONE ENCOUNTER
Pt is interested in getting Xolair Through the [facility]. What dose would you prefer Mrs Genny Phelps to be on?

## 2022-09-09 ENCOUNTER — OFFICE VISIT (OUTPATIENT)
Dept: PRIMARY CARE CLINIC | Age: 62
End: 2022-09-09
Payer: MEDICARE

## 2022-09-09 VITALS
DIASTOLIC BLOOD PRESSURE: 89 MMHG | SYSTOLIC BLOOD PRESSURE: 139 MMHG | BODY MASS INDEX: 37.38 KG/M2 | HEART RATE: 64 BPM | WEIGHT: 198 LBS | OXYGEN SATURATION: 94 % | HEIGHT: 61 IN | TEMPERATURE: 98.4 F

## 2022-09-09 DIAGNOSIS — E11.9 TYPE 2 DIABETES MELLITUS WITHOUT COMPLICATION, WITHOUT LONG-TERM CURRENT USE OF INSULIN (HCC): ICD-10-CM

## 2022-09-09 DIAGNOSIS — R05.9 COUGH: ICD-10-CM

## 2022-09-09 DIAGNOSIS — I10 PRIMARY HYPERTENSION: Primary | ICD-10-CM

## 2022-09-09 DIAGNOSIS — J45.40 MODERATE PERSISTENT ASTHMA WITHOUT COMPLICATION: ICD-10-CM

## 2022-09-09 DIAGNOSIS — Z12.11 SCREEN FOR COLON CANCER: ICD-10-CM

## 2022-09-09 DIAGNOSIS — Z11.1 SCREENING-PULMONARY TB: ICD-10-CM

## 2022-09-09 DIAGNOSIS — E78.2 MIXED HYPERLIPIDEMIA: ICD-10-CM

## 2022-09-09 LAB — HBA1C MFR BLD: 6 %

## 2022-09-09 PROCEDURE — G8417 CALC BMI ABV UP PARAM F/U: HCPCS | Performed by: FAMILY MEDICINE

## 2022-09-09 PROCEDURE — 3044F HG A1C LEVEL LT 7.0%: CPT | Performed by: FAMILY MEDICINE

## 2022-09-09 PROCEDURE — 1036F TOBACCO NON-USER: CPT | Performed by: FAMILY MEDICINE

## 2022-09-09 PROCEDURE — 2022F DILAT RTA XM EVC RTNOPTHY: CPT | Performed by: FAMILY MEDICINE

## 2022-09-09 PROCEDURE — G8427 DOCREV CUR MEDS BY ELIG CLIN: HCPCS | Performed by: FAMILY MEDICINE

## 2022-09-09 PROCEDURE — 99214 OFFICE O/P EST MOD 30 MIN: CPT | Performed by: FAMILY MEDICINE

## 2022-09-09 PROCEDURE — 83036 HEMOGLOBIN GLYCOSYLATED A1C: CPT | Performed by: FAMILY MEDICINE

## 2022-09-09 PROCEDURE — 3017F COLORECTAL CA SCREEN DOC REV: CPT | Performed by: FAMILY MEDICINE

## 2022-09-09 RX ORDER — LOSARTAN POTASSIUM 50 MG/1
50 TABLET ORAL DAILY
Qty: 90 TABLET | Refills: 1 | Status: SHIPPED | OUTPATIENT
Start: 2022-09-09

## 2022-09-09 RX ORDER — AMLODIPINE BESYLATE 10 MG/1
10 TABLET ORAL DAILY
Qty: 90 TABLET | Refills: 2 | Status: SHIPPED | OUTPATIENT
Start: 2022-09-09

## 2022-09-09 RX ORDER — ATORVASTATIN CALCIUM 40 MG/1
40 TABLET, FILM COATED ORAL DAILY
Qty: 90 TABLET | Refills: 1 | Status: SHIPPED | OUTPATIENT
Start: 2022-09-09

## 2022-09-09 SDOH — ECONOMIC STABILITY: FOOD INSECURITY: WITHIN THE PAST 12 MONTHS, YOU WORRIED THAT YOUR FOOD WOULD RUN OUT BEFORE YOU GOT MONEY TO BUY MORE.: NEVER TRUE

## 2022-09-09 SDOH — ECONOMIC STABILITY: FOOD INSECURITY: WITHIN THE PAST 12 MONTHS, THE FOOD YOU BOUGHT JUST DIDN'T LAST AND YOU DIDN'T HAVE MONEY TO GET MORE.: NEVER TRUE

## 2022-09-09 ASSESSMENT — SOCIAL DETERMINANTS OF HEALTH (SDOH): HOW HARD IS IT FOR YOU TO PAY FOR THE VERY BASICS LIKE FOOD, HOUSING, MEDICAL CARE, AND HEATING?: NOT VERY HARD

## 2022-09-09 ASSESSMENT — PATIENT HEALTH QUESTIONNAIRE - PHQ9
SUM OF ALL RESPONSES TO PHQ QUESTIONS 1-9: 0
SUM OF ALL RESPONSES TO PHQ9 QUESTIONS 1 & 2: 0
1. LITTLE INTEREST OR PLEASURE IN DOING THINGS: 0
2. FEELING DOWN, DEPRESSED OR HOPELESS: 0

## 2022-09-09 NOTE — PROGRESS NOTES
CC cough    HPI  63 y.o. female PMH asthma/allergic rhinitis , diabetes and hypertension  Who is here today with concerns for constant  Dry cough ( to point where she almost vomits  At times)        Assessment & Plan       Celso Rodriguez was seen today for other. Diagnoses and all orders for this visit:    Primary hypertension  -     losartan (COZAAR) 50 MG tablet; Take 1 tablet by mouth daily  -     amLODIPine (NORVASC) 10 MG tablet; Take 1 tablet by mouth daily    Cough  -     Quantiferon, Incubated; Future    Mixed hyperlipidemia  -     atorvastatin (LIPITOR) 40 MG tablet; Take 1 tablet by mouth daily  -     Basic Metabolic Panel; Future  -     Lipid, Fasting; Future  -     AST; Future    Type 2 diabetes mellitus without complication, without long-term current use of insulin (HCC)  -     POCT glycosylated hemoglobin (Hb A1C)  -     losartan (COZAAR) 50 MG tablet; Take 1 tablet by mouth daily  -     atorvastatin (LIPITOR) 40 MG tablet; Take 1 tablet by mouth daily  -     MICROALBUMIN / CREATININE URINE RATIO; Future  -     HM DIABETES FOOT EXAM    Screening-pulmonary TB  -     Quantiferon, Incubated;  Future    Screen for colon cancer  -     AFL - Skylar Lafleur MD, Gastroenterology, [facility] persistent asthma without complication    Pulse oximeter ordered per patient's  request          Last colonoscopy 5/15/2013  Sheldon Higginbotham md

## 2022-09-16 DIAGNOSIS — J45.40 MODERATE PERSISTENT ASTHMA WITHOUT COMPLICATION: ICD-10-CM

## 2022-09-22 ENCOUNTER — CLINICAL DOCUMENTATION (OUTPATIENT)
Dept: ONCOLOGY | Age: 62
End: 2022-09-22

## 2022-10-10 ENCOUNTER — ANESTHESIA EVENT (OUTPATIENT)
Dept: ENDOSCOPY | Age: 62
End: 2022-10-10
Payer: MEDICARE

## 2022-10-11 ENCOUNTER — HOSPITAL ENCOUNTER (OUTPATIENT)
Age: 62
Setting detail: OUTPATIENT SURGERY
Discharge: HOME | End: 2022-10-11
Attending: INTERNAL MEDICINE | Admitting: INTERNAL MEDICINE
Payer: MEDICARE

## 2022-10-11 ENCOUNTER — ANESTHESIA (OUTPATIENT)
Dept: ENDOSCOPY | Age: 62
End: 2022-10-11
Payer: MEDICARE

## 2022-10-11 VITALS
TEMPERATURE: 97.3 F | HEIGHT: 61 IN | WEIGHT: 195 LBS | SYSTOLIC BLOOD PRESSURE: 117 MMHG | BODY MASS INDEX: 36.82 KG/M2 | HEART RATE: 82 BPM | RESPIRATION RATE: 18 BRPM | DIASTOLIC BLOOD PRESSURE: 82 MMHG | OXYGEN SATURATION: 98 %

## 2022-10-11 LAB
GLUCOSE BLD-MCNC: 88 MG/DL (ref 70–99)
PERFORMED ON: NORMAL

## 2022-10-11 PROCEDURE — 2500000003 HC RX 250 WO HCPCS: Performed by: INTERNAL MEDICINE

## 2022-10-11 PROCEDURE — 2580000003 HC RX 258: Performed by: ANESTHESIOLOGY

## 2022-10-11 PROCEDURE — 2500000003 HC RX 250 WO HCPCS: Performed by: NURSE ANESTHETIST, CERTIFIED REGISTERED

## 2022-10-11 PROCEDURE — 7100000011 HC PHASE II RECOVERY - ADDTL 15 MIN: Performed by: INTERNAL MEDICINE

## 2022-10-11 PROCEDURE — 7100000010 HC PHASE II RECOVERY - FIRST 15 MIN: Performed by: INTERNAL MEDICINE

## 2022-10-11 PROCEDURE — 3609009900 HC COLONOSCOPY W/CONTROL BLEEDING ANY METHOD: Performed by: INTERNAL MEDICINE

## 2022-10-11 PROCEDURE — 6360000002 HC RX W HCPCS: Performed by: NURSE ANESTHETIST, CERTIFIED REGISTERED

## 2022-10-11 PROCEDURE — 2709999900 HC NON-CHARGEABLE SUPPLY: Performed by: INTERNAL MEDICINE

## 2022-10-11 PROCEDURE — 3700000000 HC ANESTHESIA ATTENDED CARE: Performed by: INTERNAL MEDICINE

## 2022-10-11 PROCEDURE — 2720000010 HC SURG SUPPLY STERILE: Performed by: INTERNAL MEDICINE

## 2022-10-11 PROCEDURE — 3700000001 HC ADD 15 MINUTES (ANESTHESIA): Performed by: INTERNAL MEDICINE

## 2022-10-11 RX ORDER — GLYCOPYRROLATE 0.2 MG/ML
INJECTION INTRAMUSCULAR; INTRAVENOUS PRN
Status: DISCONTINUED | OUTPATIENT
Start: 2022-10-11 | End: 2022-10-11 | Stop reason: SDUPTHER

## 2022-10-11 RX ORDER — SODIUM CHLORIDE, SODIUM LACTATE, POTASSIUM CHLORIDE, CALCIUM CHLORIDE 600; 310; 30; 20 MG/100ML; MG/100ML; MG/100ML; MG/100ML
INJECTION, SOLUTION INTRAVENOUS CONTINUOUS
Status: DISCONTINUED | OUTPATIENT
Start: 2022-10-11 | End: 2022-10-11 | Stop reason: HOSPADM

## 2022-10-11 RX ORDER — PROPOFOL 10 MG/ML
INJECTION, EMULSION INTRAVENOUS CONTINUOUS PRN
Status: DISCONTINUED | OUTPATIENT
Start: 2022-10-11 | End: 2022-10-11 | Stop reason: SDUPTHER

## 2022-10-11 RX ORDER — LIDOCAINE HYDROCHLORIDE 20 MG/ML
INJECTION, SOLUTION INFILTRATION; PERINEURAL PRN
Status: DISCONTINUED | OUTPATIENT
Start: 2022-10-11 | End: 2022-10-11 | Stop reason: SDUPTHER

## 2022-10-11 RX ADMIN — GLYCOPYRROLATE 0.4 MG: 0.2 INJECTION, SOLUTION INTRAMUSCULAR; INTRAVENOUS at 10:18

## 2022-10-11 RX ADMIN — SODIUM CHLORIDE, POTASSIUM CHLORIDE, SODIUM LACTATE AND CALCIUM CHLORIDE: 600; 310; 30; 20 INJECTION, SOLUTION INTRAVENOUS at 09:52

## 2022-10-11 RX ADMIN — LIDOCAINE HYDROCHLORIDE 50 MG: 20 INJECTION, SOLUTION INFILTRATION; PERINEURAL at 10:06

## 2022-10-11 RX ADMIN — PROPOFOL 150 MCG/KG/MIN: 10 INJECTION, EMULSION INTRAVENOUS at 10:06

## 2022-10-11 ASSESSMENT — ENCOUNTER SYMPTOMS: SHORTNESS OF BREATH: 0

## 2022-10-11 ASSESSMENT — PAIN - FUNCTIONAL ASSESSMENT: PAIN_FUNCTIONAL_ASSESSMENT: 0-10

## 2022-10-11 ASSESSMENT — PAIN SCALES - GENERAL
PAINLEVEL_OUTOF10: 0
PAINLEVEL_OUTOF10: 0

## 2022-10-11 NOTE — DISCHARGE INSTRUCTIONS
Endoscopy Discharge Instructions      Call the physician that did your procedure for any questions or concerns. Joseph Han Last [place]                   [phone]               You may be drowsy or lightheaded after receiving sedation. DO NOT operate  a vehicle (automobile, bicycle, motorcycle, machinery, or power tools), no  alcoholic beverages, and do not make any important decisions today. Plan on bed rest or quiet relaxation today. Resume normal activities in the morning. Eat a light first meal, avoiding spicy and fatty foods, then resume normal diet unless you are told otherwise by your physician. If the intravenous medication site is painful, apply warm compresses on the site until the soreness is relieved and elevate the arm above the heart. Call your physician if no improvement  in 2-3 days. POSSIBLE SYMPTOMS TO WATCH:     1. fever (greater than 100) 5. increased abdominal bloating   2. severe pain   6. excessive bleeding   3. nausea and vomiting  7. chest pain   4. chills    8. shortness of breath      Notify your physician if these problems occur     Expected as normal and remedies:  Sore throat: use over the counter throat lozenges or gargle with warm salt water. Gaseous discomfort: belching or passing flatus (gas). Call for an office visit in 2 weeks                                         We want to thank you for choosing the [facility]. as your health care provider. We hope that you received excellent care while you were here. You may receive a survey in the mail regarding your care. We would appreciate you taking a  few minutes of your time to complete this survey. Again, thank you for choosing the [facility]                 Please review these discharge instructions this evening or tomorrow for               information you may have forgotten.  Endoscopy Discharge Instructions      Call the physician that did your procedure for any questions or concerns. Hawa Cates. Cisco Citron                   [phone]               You may be drowsy or lightheaded after receiving sedation. DO NOT operate  a vehicle (automobile, bicycle, motorcycle, machinery, or power tools), no  alcoholic beverages, and do not make any important decisions today. Plan on bed rest or quiet relaxation today. Resume normal activities in the morning. Eat a light first meal, avoiding spicy and fatty foods, then resume normal diet unless you are told otherwise by your physician. If the intravenous medication site is painful, apply warm compresses on the site until the soreness is relieved and elevate the arm above the heart. Call your physician if no improvement  in 2-3 days. POSSIBLE SYMPTOMS TO WATCH:     1. fever (greater than 100) 5. increased abdominal bloating   2. severe pain   6. excessive bleeding   3. nausea and vomiting  7. chest pain   4. chills    8. shortness of breath      Notify your physician if these problems occur     Expected as normal and remedies:  Sore throat: use over the counter throat lozenges or gargle with warm salt water. Gaseous discomfort: belching or passing flatus (gas). Call for an office visit in 2 weeks                                         We want to thank you for choosing the [facility] as your health care provider. We hope that you received excellent care while you were here. You may receive a survey in the mail regarding your care. We would appreciate you taking a  few minutes of your time to complete this survey. Again, thank you for choosing the [facility]                 Please review these discharge instructions this evening or tomorrow for               information you may have forgotten.

## 2022-10-11 NOTE — PROGRESS NOTES
Patient states that she sent her ride to the ER \"because she was really ill. \"  Patient called Sofy, her granddaughter. Per Sofy, she is in ER room #20 and was just medicated. Sofy thinks that she will be up to get her grandma in the next hour. Patient is dressed and ready for her ride home (once her granddaughter arrives).

## 2022-10-11 NOTE — H&P
History and Physical / Pre-Sedation Assessment    Patient:  Anthony Frode   :   1960     Intended Procedure:  colonoscopy    HPI: colon cancer screening. H/o polyps. Fh of breast cancer    Past Medical History:   has a past medical history of Allergic rhinitis, Arthritis, Asthma, Essential hypertension, Hyperlipidemia, Neuropathy, Osteoarthritis, Type 2 diabetes mellitus without complication, without long-term current use of insulin ([address] and Wears glasses. Past Surgical History:   has a past surgical history that includes Foot surgery (Right); Carpal tunnel release (Right, 07/10/2014); Knee arthroscopy (Right); meniscectomy (Right); Total knee arthroplasty (Right, 2020); Colonoscopy; and joint replacement. Medications:  Prior to Admission medications    Medication Sig Start Date End Date Taking?  Authorizing Provider   losartan (COZAAR) 50 MG tablet Take 1 tablet by mouth daily 22   Cortney Craven MD   amLODIPine (NORVASC) 10 MG tablet Take 1 tablet by mouth daily 22   Cortney Craven MD   atorvastatin (LIPITOR) 40 MG tablet Take 1 tablet by mouth daily 22   Cortney Craven MD   loratadine (CLARITIN) 10 MG tablet Take 1 tablet by mouth daily 22   Royal Darrin MD   montelukast (SINGULAIR) 10 MG tablet Take 1 tablet by mouth daily 22   Royal Darrin MD   fluticasone-salmeterol (ADVAIR HFA) 030-73 MCG/ACT inhaler Inhale 2 puffs into the lungs 2 times daily 22   Royal Darrin MD   albuterol sulfate HFA (VENTOLIN HFA) 108 (90 Base) MCG/ACT inhaler Inhale 2 puffs into the lungs 4 times daily as needed for Wheezing 22   Cortney Craven MD   fluticasone [facility]) 50 MCG/ACT nasal spray 1 spray by Nasal route daily 22  Cortney Craven MD   hydroCHLOROthiazide (HYDRODIURIL) 25 MG tablet Take 1 tablet by mouth daily 22   Cortney Craven MD   vitamin D (ERGOCALCIFEROL) 1.25 MG (67099 UT) CAPS capsule Take 1 capsule by mouth once a week 11/10/21   Cortney Craven, MD   blood glucose test strips (ACCU-CHEK GUIDE) strip 1 each by In Vitro route daily 11/10/21   Leona Canales MD   tiZANidine (ZANAFLEX) 4 MG tablet TAKE ONE TABLET BY MOUTH FOUR TIMES A DAY AS NEEDED FOR SPASM  Patient not taking: Reported on 10/11/2022 11/4/21   Leona Canales MD   Blood Glucose Monitoring Suppl (ACCU-CHEK GUIDE ME) w/Device KIT CHECK BLOOD SUGAR DAILY 11/4/21   Leona Canales MD   Lancets MISC 1 each by Does not apply route daily 10/12/21   Leona Canales MD   albuterol-ipratropium (COMBIVENT RESPIMAT)  MCG/ACT AERS inhaler Inhale 1 puff into the lungs every 6 hours as needed for Wheezing 10/6/21   Kirti Samuels MD   aspirin 81 MG chewable tablet Take 81 mg by mouth daily     Historical Provider, MD   Blood Pressure KIT Ck blood pressure once a week 6/4/20   Leona Canales MD   acetaminophen (TYLENOL) 500 MG tablet Take 1,000 mg by mouth every 4 hours as needed for Pain    Historical Provider, MD       Family History:  family history includes Cancer in her mother; Cirrhosis in her father; High Blood Pressure in her father. Social History:   reports that she quit smoking about 13 years ago. Her smoking use included cigarettes. She has a 13.50 pack-year smoking history. She has never used smokeless tobacco. She reports current alcohol use of about 2.0 standard drinks per week. She reports that she does not use drugs. Allergies:  Patient has no known allergies. ROS:  twelve point system review was unremarkable except for above noted history. Nurses notes reviewed and agreed. Medications reviewed    Physical Exam:  Vital Signs: BP (!) 131/92   Pulse 70   Temp (!) 96.7 °F (35.9 °C) (Temporal)   Resp 16   Ht 5' 1\" (1.549 m)   Wt 195 lb (88.5 kg)   LMP 06/01/2014   SpO2 98%   BMI 36.84 kg/m²    Skin: normal  HEENT: normal  Neck: supple. No adenopathy. No thyromegaly. No JVD. Pulmonary:Normal  Cardiac:Normal  Abdomen:Normal  MS: normal  Neuro: normal  Ext: no edema.  Pulses normal    Pre-Procedure Assessment / Plan:  ASA 2 - Patient with mild systemic disease with no functional limitations  Mallampati Airway Assessment:  Mallampati Class II - (soft palate, fauces & uvula are visible)  Level of Sedation Plan: Moderate sedation  Post Procedure plan: Return to same level of care    I assessed the patient and find that the patient is in satisfactory condition to proceed with the planned procedure and sedation plan. I have explained the risk, benefits, and alternatives to the procedure. The patient understands and agrees to proceed.   Yes    Tapan Walls MD  10:01 AM 10/11/2022

## 2022-10-11 NOTE — PROGRESS NOTES
Ambulatory Surgery/Procedure Discharge Note    Vitals:    10/11/22 1058   BP: 117/82   Pulse: 82   Resp: 18   Temp:    SpO2: 98%   Dr. Elicia Matos came by to speak with the patient. In: 340 [P.O.:240; I.V.:100]  Out: -     Restroom use offered before discharge. Yes    Pain assessment:  level of pain (1-10, 10 severe)  Pain Level: 0        Patient discharged to home/self care.  Patient discharged via wheel chair by transporter to waiting family/S.O.       10/11/2022 11:40 AM

## 2022-10-11 NOTE — PROGRESS NOTES
Patient's granddaughter, Fannie Alfaro, just arrived from ER. Now patient is waiting for her transportation to arrive.   The ETA is 12:55 pm.

## 2022-10-11 NOTE — ANESTHESIA PRE PROCEDURE
Department of Anesthesiology  Preprocedure Note       Name:  Laurie Markham   Age:  63 y.o.  :  1960                                          MRN:  [id number]         Date:  10/11/2022      Surgeon: Yulissa Hillman):  Tanmay Wiggins MD    Procedure: COLONOSCOPY    Medications prior to admission:   Prior to Admission medications    Medication Sig Start Date End Date Taking?  Authorizing Provider   losartan (COZAAR) 50 MG tablet Take 1 tablet by mouth daily 22   Tatiana Goodman MD   amLODIPine (NORVASC) 10 MG tablet Take 1 tablet by mouth daily 22   Tatiana Goodman MD   atorvastatin (LIPITOR) 40 MG tablet Take 1 tablet by mouth daily 22   Tatiana Goodman MD   loratadine (CLARITIN) 10 MG tablet Take 1 tablet by mouth daily 22   Pastora Flow, MD   montelukast (SINGULAIR) 10 MG tablet Take 1 tablet by mouth daily 22   Pastora Munoz MD   fluticasone-salmeterol (ADVAIR HFA) 230-21 MCG/ACT inhaler Inhale 2 puffs into the lungs 2 times daily 22   Pastora Munoz MD   albuterol sulfate HFA (VENTOLIN HFA) 108 (90 Base) MCG/ACT inhaler Inhale 2 puffs into the lungs 4 times daily as needed for Wheezing 22   Tatiana Goodman MD   fluticasone [facility]) 50 MCG/ACT nasal spray 1 spray by Nasal route daily 22  Tatiana Goodman MD   hydroCHLOROthiazide (HYDRODIURIL) 25 MG tablet Take 1 tablet by mouth daily 22   Tatiana Goodman MD   vitamin D (ERGOCALCIFEROL) 1.25 MG (82313 UT) CAPS capsule Take 1 capsule by mouth once a week 11/10/21   Tatiana Goodman MD   blood glucose test strips (ACCU-CHEK GUIDE) strip 1 each by In Vitro route daily 11/10/21   Tatiana Goodman MD   tiZANidine (ZANAFLEX) 4 MG tablet TAKE ONE TABLET BY MOUTH FOUR TIMES A DAY AS NEEDED FOR SPASM 21   Tatiana Goodman MD   Blood Glucose Monitoring Suppl (ACCU-CHEK GUIDE ME) w/Device KIT CHECK BLOOD SUGAR DAILY 21   Tatiana Goodman MD   Lancets MISC 1 each by Does not apply route daily 10/12/21   Tatiana Goodman MD albuterol-ipratropium (COMBIVENT RESPIMAT)  MCG/ACT AERS inhaler Inhale 1 puff into the lungs every 6 hours as needed for Wheezing 10/6/21   Arminda Murphy MD   aspirin 81 MG chewable tablet Take 81 mg by mouth daily     Historical Provider, MD   Blood Pressure KIT Ck blood pressure once a week 6/4/20   Joel Ruffin MD   acetaminophen (TYLENOL) 500 MG tablet Take 1,000 mg by mouth every 4 hours as needed for Pain    Historical Provider, MD       Current medications:    No current outpatient medications on file. No current facility-administered medications for this visit.        Allergies:  No Known Allergies    Problem List:    Patient Active Problem List   Diagnosis Code    CTS (carpal tunnel syndrome) G56.00    BWC: Primary osteoarthritis of right knee M17.11    Allergic rhinitis J30.9    Polyp of colon K63.5    Diverticulosis of large intestine without hemorrhage K57.30    Mild intermittent asthma without complication U60.87    Mixed hyperlipidemia E78.2    Primary hypertension I10    Primary osteoarthritis of both knees M17.0    Type 2 diabetes mellitus without complication, without long-term current use of insulin ([facility]) E11.9    Arthritis of right knee M17.11    History of total knee arthroplasty, right Z96.651    COVID-19 U07.1    Moderate persistent asthma without complication I91.52    Moderate persistent asthma J45.40       Past Medical History:        Diagnosis Date    Allergic rhinitis     Arthritis     Asthma     not currently medically treated    Essential hypertension 2/16/2018    Hyperlipidemia     Neuropathy     Osteoarthritis     Type 2 diabetes mellitus without complication, without long-term current use of insulin ([facility] [address] 4/17/2019    Wears glasses        Past Surgical History:        Procedure Laterality Date    CARPAL TUNNEL RELEASE Right 7-10-14    FOOT SURGERY Right     rods placed    KNEE ARTHROSCOPY Right     MENISCECTOMY Right     TOTAL KNEE ARTHROPLASTY Right 2020    RIGHT TOTAL KNEE REPLACEMENT ROBOTIC ASSISTED performed by Abby Bernheim, MD at [address] [place] [address] History:    Social History     Tobacco Use    Smoking status: Former     Packs/day: 1.50     Years: 9.00     Pack years: 13.50     Types: Cigarettes     Quit date: 2009     Years since quittin.2    Smokeless tobacco: Never    Tobacco comments:     quit:  7 years ago   Substance Use Topics    Alcohol use: Yes     Comment: rare                                Counseling given: Not Answered  Tobacco comments: quit:  7 years ago      Vital Signs (Current): There were no vitals filed for this visit.                                            BP Readings from Last 3 Encounters:   22 139/89   22 132/76   22 136/82       NPO Status:                                                                                 BMI:   Wt Readings from Last 3 Encounters:   22 198 lb (89.8 kg)   22 198 lb (89.8 kg)   22 201 lb (91.2 kg)     There is no height or weight on file to calculate BMI.    CBC:   Lab Results   Component Value Date/Time    WBC 7.3 2022 10:45 AM    RBC 4.88 2022 10:45 AM    HGB 12.8 2022 10:45 AM    HCT 40.8 2022 10:45 AM    MCV 83.7 2022 10:45 AM    RDW 16.0 2022 10:45 AM     2022 10:45 AM       CMP:   Lab Results   Component Value Date/Time     10/06/2021 06:45 AM    K 4.6 10/06/2021 06:45 AM     10/06/2021 06:45 AM    CO2 18 10/06/2021 06:45 AM    BUN 20 10/06/2021 06:45 AM    CREATININE 0.8 10/06/2021 06:45 AM    GFRAA >60 10/06/2021 06:45 AM    GFRAA >60 2012 10:05 PM    AGRATIO 0.9 10/06/2021 06:45 AM    LABGLOM >60 10/06/2021 06:45 AM    GLUCOSE 252 10/06/2021 06:45 AM    PROT 6.8 10/06/2021 06:45 AM    PROT 7.6 2012 10:05 PM    CALCIUM 9.1 10/06/2021 06:45 AM    BILITOT 0.3 10/06/2021 06:45 AM    ALKPHOS 71 10/06/2021 06:45 AM    AST 14 10/06/2021 06:45 AM ALT 14 10/06/2021 06:45 AM       POC Tests:   No results for input(s): POCGLU, POCNA, POCK, POCCL, POCBUN, POCHEMO, POCHCT in the last 72 hours. Coags:   Lab Results   Component Value Date/Time    PROTIME 11.2 01/29/2020 10:40 AM    INR 0.97 01/29/2020 10:40 AM    APTT 36.7 01/29/2020 10:40 AM       HCG (If Applicable):   Lab Results   Component Value Date    PREGTESTUR Negative 07/10/2014        ABGs: No results found for: PHART, PO2ART, DUQ4YFL, ZFN4MGM, BEART, D2VMXOCG     Type & Screen (If Applicable):  No results found for: [facility]    Anesthesia Evaluation  Patient summary reviewed and Nursing notes reviewed no history of anesthetic complications:   Airway: Mallampati: III  TM distance: >3 FB   Neck ROM: full  Mouth opening: > = 3 FB   Dental: normal exam         Pulmonary:normal exam  breath sounds clear to auscultation  (+) asthma:     (-) COPD and shortness of breath                           Cardiovascular:  Exercise tolerance: good (>4 METS),   (+) hypertension:, hyperlipidemia    (-) valvular problems/murmurs, past MI, CAD, CABG/stent, dysrhythmias and  angina      Rhythm: regular  Rate: normal                    Neuro/Psych:   (+) neuromuscular disease:,    (-) seizures, TIA and CVA           GI/Hepatic/Renal:   (+) bowel prep,      (-) GERD, PUD, liver disease and no renal disease       Endo/Other:    (+) DiabetesType II DM, , : arthritis:., .                 Abdominal:   (+) obese,     Abdomen: soft. Vascular: negative vascular ROS. Other Findings:             Anesthesia Plan      MAC     ASA 2       Induction: intravenous. Anesthetic plan and risks discussed with patient. Plan discussed with CRNA.     Attending anesthesiologist reviewed and agrees with Preprocedure content                Betsy Jesus DO   10/11/2022

## 2022-10-11 NOTE — ANESTHESIA POSTPROCEDURE EVALUATION
Department of Anesthesiology  Postprocedure Note    Patient: Elif Arzate  MRN: [id number]  YOB: 1960  Date of evaluation: 10/11/2022      Procedure Summary     Date: 10/11/22 Room / Location: [address]    Anesthesia Start: 1004 Anesthesia Stop: 1042    Procedure: COLONOSCOPY APC AVM Diagnosis:       Colon cancer screening      (Colon cancer screening [Z12.11])    Surgeons: Abe Aguirre MD Responsible Provider: Max Watson DO    Anesthesia Type: MAC ASA Status: 2          Anesthesia Type: No value filed.     Jeremie Phase I: Jeremie Score: 10    Jeremie Phase II: Jeremie Score: 9      Anesthesia Post Evaluation    Patient location during evaluation: PACU  Patient participation: complete - patient participated  Level of consciousness: awake  Pain score: 0  Airway patency: patent  Nausea & Vomiting: no nausea and no vomiting  Complications: no  Cardiovascular status: hemodynamically stable  Respiratory status: acceptable  Hydration status: stable

## 2022-10-12 NOTE — OP NOTE
[facility] [address] [address], [address]                                OPERATIVE REPORT    PATIENT NAME: Eleuterio Spurling                      :        1960  MED REC NO:   [id number]                          ROOM:  ACCOUNT NO:   [de-identified]                           ADMIT DATE: 10/11/2022  PROVIDER:     Inge Alcantara MD    DATE OF PROCEDURE:  10/11/2022    SURGEON:  Inge Alcantara MD    INDICATION FOR PROCEDURE:  Colorectal cancer screening in a lady with  history of polyps and family history of breast cancer. DESCRIPTION OF PROCEDURE:  With the patient in the left lateral  position, and after IV Diprivan, the Olympus video colonoscope was  inserted into the rectum and carefully advanced to cecum. Careful  inspection did not show any sign of polyp, carcinoma, or inflammatory  bowel. An angiodysplasia was noted in the descending colon. APC  ablation was performed. Scope was then removed, and procedure was  terminated without complication. IMPRESSION:  Angiodysplasia of the descending colon. APC ablation was  performed. ESTIMATED BLOOD LOSS:  None.         Milena Carey MD    D: 10/11/2022 11:00:06       T: 10/11/2022 13:05:03     [email or web address]  Job#: [id number]     Doc#: [id number]    CC:  MD Kyra Gold MD

## 2022-11-07 ENCOUNTER — TELEPHONE (OUTPATIENT)
Dept: PULMONOLOGY | Age: 62
End: 2022-11-07

## 2022-11-07 NOTE — TELEPHONE ENCOUNTER
Pt left voice mail asking for a doctor note for jury duty. As she still has cough. Her jury duty number is # [id number] and said to include that in the note.     Fax letter to [phone]

## 2022-11-07 NOTE — TELEPHONE ENCOUNTER
Please write the following in the letter:    Patient (name) has asthma and allergic rhinitis which I am treating with medications. She is still struggling with cough which might hamper her involvement for jury duty (jury duty number).

## 2022-11-13 ENCOUNTER — HOSPITAL ENCOUNTER (EMERGENCY)
Age: 62
Discharge: HOME | End: 2022-11-13
Payer: MEDICARE

## 2022-11-13 ENCOUNTER — APPOINTMENT (OUTPATIENT)
Dept: GENERAL RADIOLOGY | Age: 62
End: 2022-11-13
Payer: MEDICARE

## 2022-11-13 VITALS
HEIGHT: 61 IN | DIASTOLIC BLOOD PRESSURE: 84 MMHG | OXYGEN SATURATION: 93 % | BODY MASS INDEX: 38.17 KG/M2 | HEART RATE: 85 BPM | RESPIRATION RATE: 17 BRPM | SYSTOLIC BLOOD PRESSURE: 142 MMHG | WEIGHT: 202.16 LBS | TEMPERATURE: 98.4 F

## 2022-11-13 DIAGNOSIS — B34.9 VIRAL ILLNESS: Primary | ICD-10-CM

## 2022-11-13 DIAGNOSIS — Z20.822 LAB TEST NEGATIVE FOR COVID-19 VIRUS: ICD-10-CM

## 2022-11-13 LAB
RAPID INFLUENZA  B AGN: NEGATIVE
RAPID INFLUENZA A AGN: NEGATIVE
S PYO AG THROAT QL: NEGATIVE
SARS-COV-2, NAAT: NOT DETECTED

## 2022-11-13 PROCEDURE — 71046 X-RAY EXAM CHEST 2 VIEWS: CPT

## 2022-11-13 PROCEDURE — 99285 EMERGENCY DEPT VISIT HI MDM: CPT

## 2022-11-13 PROCEDURE — 87635 SARS-COV-2 COVID-19 AMP PRB: CPT

## 2022-11-13 PROCEDURE — 93005 ELECTROCARDIOGRAM TRACING: CPT | Performed by: NURSE PRACTITIONER

## 2022-11-13 PROCEDURE — 87081 CULTURE SCREEN ONLY: CPT

## 2022-11-13 PROCEDURE — 87804 INFLUENZA ASSAY W/OPTIC: CPT

## 2022-11-13 PROCEDURE — 87880 STREP A ASSAY W/OPTIC: CPT

## 2022-11-13 PROCEDURE — 6370000000 HC RX 637 (ALT 250 FOR IP): Performed by: NURSE PRACTITIONER

## 2022-11-13 PROCEDURE — 73630 X-RAY EXAM OF FOOT: CPT

## 2022-11-13 RX ORDER — ACETAMINOPHEN 500 MG
1000 TABLET ORAL ONCE
Status: COMPLETED | OUTPATIENT
Start: 2022-11-13 | End: 2022-11-13

## 2022-11-13 RX ORDER — IBUPROFEN 600 MG/1
600 TABLET ORAL 3 TIMES DAILY PRN
Qty: 15 TABLET | Refills: 0 | Status: SHIPPED | OUTPATIENT
Start: 2022-11-13 | End: 2022-11-18

## 2022-11-13 RX ORDER — ACETAMINOPHEN 500 MG
500 TABLET ORAL 4 TIMES DAILY PRN
Qty: 28 TABLET | Refills: 0 | Status: SHIPPED | OUTPATIENT
Start: 2022-11-13 | End: 2022-11-20

## 2022-11-13 RX ORDER — IBUPROFEN 400 MG/1
800 TABLET ORAL ONCE
Status: COMPLETED | OUTPATIENT
Start: 2022-11-13 | End: 2022-11-13

## 2022-11-13 RX ORDER — BENZONATATE 100 MG/1
100 CAPSULE ORAL NIGHTLY
Qty: 7 CAPSULE | Refills: 0 | Status: SHIPPED | OUTPATIENT
Start: 2022-11-13 | End: 2022-11-20

## 2022-11-13 RX ADMIN — ACETAMINOPHEN 1000 MG: 500 TABLET ORAL at 11:39

## 2022-11-13 RX ADMIN — IBUPROFEN 800 MG: 400 TABLET, FILM COATED ORAL at 11:39

## 2022-11-13 ASSESSMENT — LIFESTYLE VARIABLES
HOW OFTEN DO YOU HAVE A DRINK CONTAINING ALCOHOL: NEVER
HOW MANY STANDARD DRINKS CONTAINING ALCOHOL DO YOU HAVE ON A TYPICAL DAY: PATIENT DOES NOT DRINK

## 2022-11-13 ASSESSMENT — PAIN SCALES - GENERAL
PAINLEVEL_OUTOF10: 8
PAINLEVEL_OUTOF10: 0
PAINLEVEL_OUTOF10: 8

## 2022-11-13 ASSESSMENT — PAIN - FUNCTIONAL ASSESSMENT
PAIN_FUNCTIONAL_ASSESSMENT: 0-10
PAIN_FUNCTIONAL_ASSESSMENT: 0-10

## 2022-11-13 ASSESSMENT — PAIN DESCRIPTION - PAIN TYPE: TYPE: ACUTE PAIN

## 2022-11-13 ASSESSMENT — PAIN DESCRIPTION - ONSET: ONSET: GRADUAL

## 2022-11-13 ASSESSMENT — PAIN DESCRIPTION - LOCATION: LOCATION: CHEST

## 2022-11-13 ASSESSMENT — PAIN DESCRIPTION - FREQUENCY: FREQUENCY: INTERMITTENT

## 2022-11-13 ASSESSMENT — PAIN DESCRIPTION - DESCRIPTORS: DESCRIPTORS: TIGHTNESS;PRESSURE

## 2022-11-13 NOTE — ED NOTES
D/C: Order noted for d/c. Pt confirmed d/c paperwork have correct name. Discharge and education instructions reviewed with patient. Teach-back successful. Pt verbalized understanding and signed d/c papers. Pt denied questions at this time. No acute distress noted. Patient instructed to follow-up as noted - return to emergency department if symptoms worsen. Patient verbalized understanding. Discharged per EDMD with discharge instructions. Pt discharged to private vehicle. Patient stable upon departure. Thanked patient for choosing [facility] for care. Provider aware of patient pain at time of discharge.        Nicanor Ferguson RN  11/13/22 1315

## 2022-11-13 NOTE — DISCHARGE INSTRUCTIONS
Your laboratory test here today were negative for COVID-19, influenza AMB, as well as strep. Your chest x-ray was also negative for pneumonia or other acute findings. The likely etiology of your symptoms is a regular viral illness. Medication as prescribed. Drink plenty of fluids. Get plenty of rest.    Follow-up with your PCP for reevaluation in next 1-2 days. You are coughing up phlegm. I do not want to suppress your cough all day since you are expectorating. But at night take the [employer] which will help suppress your cough so that you can sleep.

## 2022-11-13 NOTE — ED NOTES
Pt ambulated with Tech around station, pt 02 sat remains at 93% while ambulating.       Lanny Scales RN  11/13/22 1232

## 2022-11-14 LAB
EKG ATRIAL RATE: 85 BPM
EKG DIAGNOSIS: NORMAL
EKG P AXIS: 49 DEGREES
EKG P-R INTERVAL: 152 MS
EKG Q-T INTERVAL: 364 MS
EKG QRS DURATION: 70 MS
EKG QTC CALCULATION (BAZETT): 433 MS
EKG R AXIS: 3 DEGREES
EKG T AXIS: -20 DEGREES
EKG VENTRICULAR RATE: 85 BPM

## 2022-11-14 PROCEDURE — 93010 ELECTROCARDIOGRAM REPORT: CPT | Performed by: INTERNAL MEDICINE

## 2022-11-15 ENCOUNTER — TELEPHONE (OUTPATIENT)
Dept: PRIMARY CARE CLINIC | Age: 62
End: 2022-11-15

## 2022-11-15 DIAGNOSIS — J20.9 ACUTE BRONCHITIS, UNSPECIFIED ORGANISM: Primary | ICD-10-CM

## 2022-11-15 LAB — S PYO THROAT QL CULT: NORMAL

## 2022-11-15 RX ORDER — AZITHROMYCIN 250 MG/1
TABLET, FILM COATED ORAL
Qty: 1 PACKET | Refills: 0 | Status: SHIPPED | OUTPATIENT
Start: 2022-11-15

## 2022-11-15 NOTE — TELEPHONE ENCOUNTER
Patient called in stating that she was seen in the Er and was prescribed some medication  and says that it is not working. Wants to know if she can get a z pack sent to the pharmacy. States that she can't sleep and still has a horrible cough.       Please advise   [phone]

## 2022-11-18 ASSESSMENT — ENCOUNTER SYMPTOMS
BACK PAIN: 0
ABDOMINAL PAIN: 0
COUGH: 1
EYE PAIN: 0
SHORTNESS OF BREATH: 1
SORE THROAT: 0
VOMITING: 0
ANAL BLEEDING: 0
NAUSEA: 0
WHEEZING: 1

## 2022-11-18 NOTE — ED PROVIDER NOTES
[address]  [address] [address]  Dept: [phone]  Loc: [address] ENCOUNTER    MATT. I have evaluated this patient. My supervising physician was available for consultation. CHIEF COMPLAINT    Chief Complaint   Patient presents with    Cough     With wheezing productive yellow and clear phlegm and thick. With chest pressure since Wednesday. NEEMA Doe is a 63 y.o. female with medical history including, not limited to, asthma, essential hypertension, hyperlipidemia, neuropathy, and type 2 diabetes mellitus-diet controlled who presents with concern for possible COVID-19 infection. Symptoms include cough productive of yellowish phlegm, wheezing, chest pain with cough only, nasal and chest congestion, shortness of breath, chills, sweats, and body \"aches\" rated severity of 8/10. Onset was 4 days ago. The duration has been constant since the onset. There are no alleviating factors. Patient is COVID-19 vaccinated but not boosted. No influenza vaccination this year. The patient has had recent sick contacts as she reportedly worked at the [employer] this week. Denies chest pain, nausea, vomiting, dizziness, presyncope, measured fevers, change in ability to smell/taste, mopped assist, leg/calf pain or swelling, headache, abdominal pain, diarrhea, urinary symptoms/retention, other concerns. REVIEW OF SYSTEMS    Review of Systems   Constitutional:  Positive for chills and diaphoresis. Negative for fatigue and fever. HENT:  Positive for congestion (Nasal and chest congestion). Negative for sore throat. Eyes:  Negative for pain and visual disturbance. Respiratory:  Positive for cough, shortness of breath and wheezing. Cardiovascular:  Positive for chest pain (With cough only). Negative for leg swelling.    Gastrointestinal:  Negative for abdominal pain, anal bleeding, nausea and vomiting. Genitourinary:  Negative for difficulty urinating, dysuria, frequency and urgency. Musculoskeletal:  Positive for myalgias. Negative for back pain and neck pain. Skin:  Negative for rash and wound. Neurological:  Negative for dizziness and light-headedness.            PAST MEDICAL AND SURGICAL HISTORY    Past Medical History:   Diagnosis Date    Allergic rhinitis     Arthritis     Asthma     not currently medically treated    Essential hypertension 02/16/2018    Hyperlipidemia     Neuropathy     Osteoarthritis     Type 2 diabetes mellitus without complication, without long-term current use of insulin ([address] 04/17/2019    diet controlled    Wears glasses      Past Surgical History:   Procedure Laterality Date    CARPAL TUNNEL RELEASE Right 07/10/2014    COLONOSCOPY      COLONOSCOPY N/A 10/11/2022    COLONOSCOPY APC AVM performed by Gabriela Jerome MD at [facility] Right     rods placed    JOINT REPLACEMENT      KNEE ARTHROSCOPY Right     MENISCECTOMY Right     TOTAL KNEE ARTHROPLASTY Right 02/12/2020    RIGHT TOTAL KNEE REPLACEMENT ROBOTIC ASSISTED performed by Gloria Lopez MD at [address] [place] [address]  (may include discharge medications prescribed in the ED)  Current Outpatient Rx   Medication Sig Dispense Refill    ibuprofen (ADVIL;MOTRIN) 600 MG tablet Take 1 tablet by mouth 3 times daily as needed for Pain With meals 15 tablet 0    acetaminophen (TYLENOL) 500 MG tablet Take 1 tablet by mouth 4 times daily as needed for Pain 28 tablet 0    dextromethorphan-guaiFENesin (MUCINEX DM)  MG per extended release tablet Take 1 tablet by mouth every 12 hours as needed for Cough or Congestion 14 tablet 0    benzonatate (TESSALON) 100 MG capsule Take 1 capsule by mouth at bedtime for 7 days 7 capsule 0    azithromycin (ZITHROMAX) 250 MG tablet Take 2 tabs (500 mg) on Day 1, and take 1 tab (250 mg) on days 2 through 5. 1 packet 0    losartan (COZAAR) 50 MG tablet Take 1 tablet by mouth daily 90 tablet 1    amLODIPine (NORVASC) 10 MG tablet Take 1 tablet by mouth daily 90 tablet 2    atorvastatin (LIPITOR) 40 MG tablet Take 1 tablet by mouth daily 90 tablet 1    loratadine (CLARITIN) 10 MG tablet Take 1 tablet by mouth daily 90 tablet 3    montelukast (SINGULAIR) 10 MG tablet Take 1 tablet by mouth daily 90 tablet 3    fluticasone-salmeterol (ADVAIR HFA) 230-21 MCG/ACT inhaler Inhale 2 puffs into the lungs 2 times daily 12 g 3    albuterol sulfate HFA (VENTOLIN HFA) 108 (90 Base) MCG/ACT inhaler Inhale 2 puffs into the lungs 4 times daily as needed for Wheezing 54 g 1    fluticasone (FLONASE) 50 MCG/ACT nasal spray 1 spray by Nasal route daily 16 g 3    hydroCHLOROthiazide (HYDRODIURIL) 25 MG tablet Take 1 tablet by mouth daily 90 tablet 5    vitamin D (ERGOCALCIFEROL) 1.25 MG (53838 UT) CAPS capsule Take 1 capsule by mouth once a week 4 capsule 2    blood glucose test strips (ACCU-CHEK GUIDE) strip 1 each by In Vitro route daily 100 each 1    tiZANidine (ZANAFLEX) 4 MG tablet TAKE ONE TABLET BY MOUTH FOUR TIMES A DAY AS NEEDED FOR SPASM 40 tablet 0    Blood Glucose Monitoring Suppl (ACCU-CHEK GUIDE ME) w/Device KIT CHECK BLOOD SUGAR DAILY 1 kit 0    Lancets MISC 1 each by Does not apply route daily 100 each 1    albuterol-ipratropium (COMBIVENT RESPIMAT)  MCG/ACT AERS inhaler Inhale 1 puff into the lungs every 6 hours as needed for Wheezing 1 each 0    aspirin 81 MG chewable tablet Take 81 mg by mouth daily       Blood Pressure KIT Ck blood pressure once a week 1 kit 0       ALLERGIES    Allergies   Allergen Reactions    No Known Allergies        FAMILY AND SOCIAL HISTORY    Family History   Problem Relation Age of Onset    Cancer Mother         pancreatic    Cirrhosis Father     High Blood Pressure Father      Social History     Socioeconomic History    Marital status: Single     Spouse name: None    Number of children: 6    Years of education: None    Highest education level: None   Occupational History    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   Tobacco Use    Smoking status: Former     Packs/day: 1.50     Years: 9.00     Pack years: 13.50     Types: Cigarettes     Quit date: 2009     Years since quittin.3    Smokeless tobacco: Never    Tobacco comments:     quit:  7 years ago   Vaping Use    Vaping Use: Never used   Substance and Sexual Activity    Alcohol use: Yes     Alcohol/week: 2.0 standard drinks     Types: 1 Cans of beer, 1 Shots of liquor per week     Comment: rare    Drug use: Never    Sexual activity: Never     Social Determinants of Health     Financial Resource Strain: Low Risk     Difficulty of Paying Living Expenses: Not very hard   Food Insecurity: No Food Insecurity    Worried About Running Out of Food in the Last Year: Never true    Ran Out of Food in the Last Year: Never true       PHYSICAL EXAM    VITAL SIGNS: BP (!) 142/84   Pulse 85   Temp 98.4 °F (36.9 °C) (Oral)   Resp 17   Ht 5' 1\" (1.549 m)   Wt 202 lb 2.6 oz (91.7 kg)   LMP 2014   SpO2 93%   BMI 38.20 kg/m²   Constitutional:  Well developed, well nourished, appearing in mild acute distress  Eyes: Sclera nonicteric, conjunctiva normal   Ears: external ears normal, TM's clear bilaterally  Throat/Face:  no exudate or edema of the throat, no trismus, moist mucus membranes  Neck: Supple, no enlarged tonsillar lymphadenopathy  Respiratory:  Lungs clear to auscultation bilaterally, no retractions, speaking in complete sentences, O2 saturation 93 % on room air  Cardiovascular:  regular rate, regular rhythm  GI:  soft, nontender, nondistended  Neurologic:  Awake, alert and oriented, no slurred speech  Integument:  Skin is warm and dry, no rash    RADIOLOGY/PROCEDURES    XR FOOT RIGHT (MIN 3 VIEWS)   Final Result   No acute osseous abnormality. XR CHEST (2 VW)   Final Result   No acute process.              ED COURSE & MEDICAL DECISION MAKING    See chart for details of medications RIGHT (MIN 3 VIEWS)    Result Date: 11/13/2022  No acute osseous abnormality. Work-up reveals:  Strep culture: Pending  COVID-19: Not detected  Rapid influenza A/B: Negative  Rapid strep: Negative  CXR: As noted above within defined no acute process          Patient presenting without fever and is nontoxic in appearance. Patient saturating at 93% on room air. 9/9/2022 pulse ox reading in office was 94% on room air/baseline finding of 93% here. Respirations easy at 17 patient is not tachycardic. Patient has a history of asthma. EKG as interpreted by EMD, see note for details. Therefore, have low suspicion for the presence of emergent medical condition at this time and feel that the risk of additional laboratory testing, imaging, and/for admission outweighs any potential benefit at this time. However, patient given strict return precaution    The patient was counseled to closely monitor their symptoms, and to return immediately to the Emergency Department for any difficulty breathing, confusion, dizziness or passing out, vomiting, chest pain, high fevers, weakness, or any other new or concerning symptoms. There is no evidence of emergent medical condition at this time, and the patient will be discharged in good condition. The patient was instructed to follow up as an outpatient in 1-2 days. The patient was instructed to return to the ED immediately for any new or worsening symptoms. The patient verbalized understanding. FINAL IMPRESSION    1. Viral illness    2.  Lab test negative for COVID-19 virus        PLAN  Outpatient medications, followup, and discharge instructions (see EMR)      (Please note that this note was completed with a voice recognition program.  Every attempt was made to edit the dictations, but inevitably there remain words that are mis-transcribed.)                  Trey Lu, DIANA - CNP  11/18/22 9565

## 2022-12-09 ENCOUNTER — OFFICE VISIT (OUTPATIENT)
Dept: PRIMARY CARE CLINIC | Age: 62
End: 2022-12-09
Payer: MEDICARE

## 2022-12-09 VITALS
DIASTOLIC BLOOD PRESSURE: 84 MMHG | OXYGEN SATURATION: 97 % | BODY MASS INDEX: 38.14 KG/M2 | SYSTOLIC BLOOD PRESSURE: 136 MMHG | WEIGHT: 202 LBS | HEIGHT: 61 IN | HEART RATE: 97 BPM | TEMPERATURE: 97.2 F

## 2022-12-09 DIAGNOSIS — M17.11 PRIMARY OSTEOARTHRITIS OF RIGHT KNEE: ICD-10-CM

## 2022-12-09 DIAGNOSIS — E11.9 TYPE 2 DIABETES MELLITUS WITHOUT COMPLICATION, WITHOUT LONG-TERM CURRENT USE OF INSULIN (HCC): ICD-10-CM

## 2022-12-09 DIAGNOSIS — R05.9 COUGH: ICD-10-CM

## 2022-12-09 DIAGNOSIS — Z11.1 SCREENING-PULMONARY TB: ICD-10-CM

## 2022-12-09 DIAGNOSIS — E78.2 MIXED HYPERLIPIDEMIA: ICD-10-CM

## 2022-12-09 DIAGNOSIS — E78.2 MIXED HYPERLIPIDEMIA: Primary | ICD-10-CM

## 2022-12-09 DIAGNOSIS — I10 PRIMARY HYPERTENSION: ICD-10-CM

## 2022-12-09 LAB
ANION GAP SERPL CALCULATED.3IONS-SCNC: 11 MMOL/L (ref 3–16)
AST SERPL-CCNC: 12 U/L (ref 15–37)
BUN BLDV-MCNC: 11 MG/DL (ref 7–20)
CALCIUM SERPL-MCNC: 9.8 MG/DL (ref 8.3–10.6)
CHLORIDE BLD-SCNC: 104 MMOL/L (ref 99–110)
CHOLESTEROL, FASTING: 135 MG/DL (ref 0–199)
CO2: 28 MMOL/L (ref 21–32)
CREAT SERPL-MCNC: 0.8 MG/DL (ref 0.6–1.2)
CREATININE URINE: 206.6 MG/DL (ref 28–259)
GFR SERPL CREATININE-BSD FRML MDRD: >60 ML/MIN/{1.73_M2}
GLUCOSE BLD-MCNC: 97 MG/DL (ref 70–99)
HBA1C MFR BLD: 6 %
HDLC SERPL-MCNC: 45 MG/DL (ref 40–60)
LDL CHOLESTEROL CALCULATED: 64 MG/DL
MICROALBUMIN UR-MCNC: 2.3 MG/DL
MICROALBUMIN/CREAT UR-RTO: 11.1 MG/G (ref 0–30)
POTASSIUM SERPL-SCNC: 4.6 MMOL/L (ref 3.5–5.1)
SODIUM BLD-SCNC: 143 MMOL/L (ref 136–145)
TRIGLYCERIDE, FASTING: 131 MG/DL (ref 0–150)
VLDLC SERPL CALC-MCNC: 26 MG/DL

## 2022-12-09 PROCEDURE — 3017F COLORECTAL CA SCREEN DOC REV: CPT | Performed by: FAMILY MEDICINE

## 2022-12-09 PROCEDURE — 3078F DIAST BP <80 MM HG: CPT | Performed by: FAMILY MEDICINE

## 2022-12-09 PROCEDURE — G8417 CALC BMI ABV UP PARAM F/U: HCPCS | Performed by: FAMILY MEDICINE

## 2022-12-09 PROCEDURE — G8484 FLU IMMUNIZE NO ADMIN: HCPCS | Performed by: FAMILY MEDICINE

## 2022-12-09 PROCEDURE — 99214 OFFICE O/P EST MOD 30 MIN: CPT | Performed by: FAMILY MEDICINE

## 2022-12-09 PROCEDURE — 3044F HG A1C LEVEL LT 7.0%: CPT | Performed by: FAMILY MEDICINE

## 2022-12-09 PROCEDURE — G8427 DOCREV CUR MEDS BY ELIG CLIN: HCPCS | Performed by: FAMILY MEDICINE

## 2022-12-09 PROCEDURE — 83036 HEMOGLOBIN GLYCOSYLATED A1C: CPT | Performed by: FAMILY MEDICINE

## 2022-12-09 PROCEDURE — 1036F TOBACCO NON-USER: CPT | Performed by: FAMILY MEDICINE

## 2022-12-09 PROCEDURE — 2022F DILAT RTA XM EVC RTNOPTHY: CPT | Performed by: FAMILY MEDICINE

## 2022-12-09 PROCEDURE — 3074F SYST BP LT 130 MM HG: CPT | Performed by: FAMILY MEDICINE

## 2022-12-09 RX ORDER — AMLODIPINE BESYLATE 10 MG/1
10 TABLET ORAL DAILY
Qty: 90 TABLET | Refills: 2 | Status: SHIPPED | OUTPATIENT
Start: 2022-12-09

## 2022-12-09 RX ORDER — ATORVASTATIN CALCIUM 40 MG/1
40 TABLET, FILM COATED ORAL DAILY
Qty: 90 TABLET | Refills: 1 | Status: SHIPPED | OUTPATIENT
Start: 2022-12-09

## 2022-12-09 RX ORDER — HYDROCHLOROTHIAZIDE 25 MG/1
25 TABLET ORAL DAILY
Qty: 90 TABLET | Refills: 5 | Status: SHIPPED | OUTPATIENT
Start: 2022-12-09

## 2022-12-09 RX ORDER — LOSARTAN POTASSIUM 50 MG/1
50 TABLET ORAL DAILY
Qty: 90 TABLET | Refills: 1 | Status: SHIPPED | OUTPATIENT
Start: 2022-12-09

## 2022-12-09 ASSESSMENT — ENCOUNTER SYMPTOMS
APNEA: 0
RECTAL PAIN: 0
EYE REDNESS: 0
RHINORRHEA: 0
EYE ITCHING: 0
TROUBLE SWALLOWING: 0
BLOOD IN STOOL: 0
COUGH: 0
WHEEZING: 0
SHORTNESS OF BREATH: 0
EYE DISCHARGE: 0
COLOR CHANGE: 0
NAUSEA: 0
CHOKING: 0
SORE THROAT: 0
SINUS PRESSURE: 0
EYE PAIN: 0
CONSTIPATION: 0
VOMITING: 0
ABDOMINAL DISTENTION: 0
DIARRHEA: 0
STRIDOR: 0
BACK PAIN: 0
CHEST TIGHTNESS: 0
PHOTOPHOBIA: 0

## 2022-12-09 ASSESSMENT — PATIENT HEALTH QUESTIONNAIRE - PHQ9
2. FEELING DOWN, DEPRESSED OR HOPELESS: 0
1. LITTLE INTEREST OR PLEASURE IN DOING THINGS: 0
SUM OF ALL RESPONSES TO PHQ9 QUESTIONS 1 & 2: 0
SUM OF ALL RESPONSES TO PHQ QUESTIONS 1-9: 0

## 2022-12-09 NOTE — PROGRESS NOTES
3 months       Samson Quintanilla (:  1960) is a 63 y.o. female,Established patient, here for evaluation of the following chief complaint(s):  3 Month Follow-Up         ASSESSMENT/PLAN:  1. Type 2 diabetes mellitus without complication, without long-term current use of insulin (HCC)  AIC now 6.0  At goal  Diet controlled  Cont statin  -     POCT glycosylated hemoglobin (Hb A1C)  -     atorvastatin (LIPITOR) 40 MG tablet; Take 1 tablet by mouth daily, Disp-90 tablet, R-1Normal  -     MICROALBUMIN / CREATININE URINE RATIO; Future  -     losartan (COZAAR) 50 MG tablet; Take 1 tablet by mouth daily, Disp-90 tablet, R-1Normal  2. Mixed hyperlipidemia  Lipids pending from today  Labs ordered  Low fat diet  Labs ordered today  Refill statin  -     atorvastatin (LIPITOR) 40 MG tablet; Take 1 tablet by mouth daily, Disp-90 tablet, R-1Normal  3. Primary hypertension  Bp at goal  < 140/90  Low salt diet  Meds refill  Labs ordered for today  -     amLODIPine (NORVASC) 10 MG tablet; Take 1 tablet by mouth daily, Disp-90 tablet, R-2Normal  -     losartan (COZAAR) 50 MG tablet; Take 1 tablet by mouth daily, Disp-90 tablet, R-1Normal  -     hydroCHLOROthiazide (HYDRODIURIL) 25 MG tablet; Take 1 tablet by mouth daily, Disp-90 tablet, R-5Requesting 1 year supplyNormal  Primary arthritis of the R knee  Exercise, cont acetaminophen  Arthritis R knee needs handicap sticker  No follow-ups on file. Subjective   SUBJECTIVE/OBJECTIVE:  HPI 63 y.o. female  PMH Diabetes type 2 hyperlipidemia  hypertension  & osteoarthritis of the R knee fu visit today  Hyperlipidemia (No muscle aches or muscle weakness or cramps since last visit.)  Hypertension (No chest pain, headache, sob, leg edema, stroke, kidney disease )  diabetes type 2 no glucose cking ( No polyuria, polydipsia, weight changes or blurred vision)   chronic pain R knee 2/2 arthritis no current joint swelling.  Some relief with tylenol.      )        Review of Systems Constitutional:  Negative for activity change, appetite change, chills, diaphoresis, fatigue and fever. HENT:  Negative for congestion, dental problem, drooling, ear discharge, ear pain, hearing loss, mouth sores, nosebleeds, postnasal drip, rhinorrhea, sinus pressure, sneezing, sore throat, tinnitus and trouble swallowing. Eyes:  Negative for photophobia, pain, discharge, redness, itching and visual disturbance. Respiratory:  Negative for apnea, cough, choking, chest tightness, shortness of breath, wheezing and stridor. Cardiovascular:  Negative for chest pain, palpitations and leg swelling. Gastrointestinal:  Negative for abdominal distention, blood in stool, constipation, diarrhea, nausea, rectal pain and vomiting. Genitourinary:  Negative for decreased urine volume, difficulty urinating, dyspareunia, dysuria, enuresis, flank pain, frequency, genital sores, hematuria, menstrual problem, pelvic pain, urgency, vaginal bleeding and vaginal pain. Musculoskeletal:  Negative for arthralgias, back pain, gait problem, joint swelling, myalgias, neck pain and neck stiffness. Chronic R knee pain   Skin:  Negative for color change, pallor, rash and wound. Neurological:  Negative for dizziness, tremors, seizures, syncope, facial asymmetry, speech difficulty, weakness, light-headedness, numbness and headaches. Hematological:  Negative for adenopathy. Does not bruise/bleed easily. Psychiatric/Behavioral:  Negative for agitation, behavioral problems, confusion, decreased concentration, dysphoric mood, hallucinations, self-injury, sleep disturbance and suicidal ideas. The patient is not nervous/anxious and is not hyperactive. Objective   Physical Exam  Cardiovascular:      Rate and Rhythm: Normal rate. Pulses: Normal pulses.    Pulmonary:      Effort: Pulmonary effort is normal.   Musculoskeletal:      Comments: No deformity R knee appreciated                An electronic signature was used to authenticate this note.     --Domonique Shi MD

## 2022-12-09 NOTE — LETTER
[facility]  [id number] [facility] [address]  Phone: [phone]  Fax: [phone]    María Zhao MD         December 9, 2022     Patient: Anthony Forde   YOB: 1960   Date of Visit: 12/9/2022       To Whom It May Concern: It is my medical opinion that Anthony Forde requires a disability parking placard for the following reasons:  She cannot walk 200 feet without stopping to rest.  Duration of need: 5 years    If you have any questions or concerns, please don't hesitate to call.     Sincerely,        María Zhao MD

## 2022-12-12 ENCOUNTER — TELEPHONE (OUTPATIENT)
Dept: PRIMARY CARE CLINIC | Age: 62
End: 2022-12-12

## 2022-12-12 LAB
QUANTI TB GOLD PLUS: NEGATIVE
QUANTI TB1 MINUS NIL: 0 IU/ML (ref 0–0.34)
QUANTI TB2 MINUS NIL: 0 IU/ML (ref 0–0.34)
QUANTIFERON MITOGEN: >10 IU/ML
QUANTIFERON NIL: 0.02 IU/ML

## 2022-12-16 ENCOUNTER — TELEPHONE (OUTPATIENT)
Dept: CASE MANAGEMENT | Age: 62
End: 2022-12-16

## 2022-12-16 NOTE — TELEPHONE ENCOUNTER
Pt due for lung nodule follow-up. Reminder letter mailed.        Amauri AKHTARN, RN   Lung Nodule Navigator  [facility]  [phone]

## 2023-01-12 ENCOUNTER — TELEPHONE (OUTPATIENT)
Dept: PRIMARY CARE CLINIC | Age: 63
End: 2023-01-12

## 2023-01-12 NOTE — TELEPHONE ENCOUNTER
PT called in requesting a referral for a pulmonologist at [facility] and to have the referral be faxed to: [phone].     Best call back number: [phone]

## 2023-01-14 DIAGNOSIS — J45.40 MODERATE PERSISTENT ASTHMA, UNSPECIFIED WHETHER COMPLICATED: Primary | ICD-10-CM

## 2023-01-17 ENCOUNTER — TELEPHONE (OUTPATIENT)
Dept: PRIMARY CARE CLINIC | Age: 63
End: 2023-01-17

## 2023-01-17 NOTE — TELEPHONE ENCOUNTER
----- Message from Tera Cerna sent at 1/17/2023 12:50 PM EST -----  Subject: Referral Request    Reason for referral request? pt is asking for call back, referral still   hasn't been sent to pulmonologist, call pt back when its been sent  Provider patient wants to be referred to(if known):     Provider Phone Number(if known):     Additional Information for Provider?   ---------------------------------------------------------------------------  --------------  [address]    [phone]; OK to leave message on voicemail  ---------------------------------------------------------------------------  --------------

## 2023-01-26 ENCOUNTER — TELEPHONE (OUTPATIENT)
Dept: ORTHOPEDIC SURGERY | Age: 63
End: 2023-01-26

## 2023-01-26 NOTE — TELEPHONE ENCOUNTER
I called and informed patient she should still be seen yearly for her total joint.  She made apt for next month,

## 2023-01-26 NOTE — TELEPHONE ENCOUNTER
General Question     Subject: FU VISITS  Patient and /or Facility Request: Jigna Gilliam  Contact Number: [phone]     Pt ASKING HOW OFTEN SHE NEEDS TO BE SEEN FOR HER FU APPTS, NOW? PLEASE ADVISE @ THE # ABOVE.

## 2023-02-16 ENCOUNTER — OFFICE VISIT (OUTPATIENT)
Dept: ORTHOPEDIC SURGERY | Age: 63
End: 2023-02-16
Payer: MEDICARE

## 2023-02-16 VITALS — HEIGHT: 61 IN | WEIGHT: 202 LBS | BODY MASS INDEX: 38.14 KG/M2 | RESPIRATION RATE: 16 BRPM

## 2023-02-16 DIAGNOSIS — Z96.651 HISTORY OF TOTAL KNEE ARTHROPLASTY, RIGHT: Primary | ICD-10-CM

## 2023-02-16 PROCEDURE — 99212 OFFICE O/P EST SF 10 MIN: CPT | Performed by: PHYSICIAN ASSISTANT

## 2023-02-16 PROCEDURE — G8484 FLU IMMUNIZE NO ADMIN: HCPCS | Performed by: PHYSICIAN ASSISTANT

## 2023-02-16 PROCEDURE — G8427 DOCREV CUR MEDS BY ELIG CLIN: HCPCS | Performed by: PHYSICIAN ASSISTANT

## 2023-02-16 PROCEDURE — 3017F COLORECTAL CA SCREEN DOC REV: CPT | Performed by: PHYSICIAN ASSISTANT

## 2023-02-16 PROCEDURE — G8417 CALC BMI ABV UP PARAM F/U: HCPCS | Performed by: PHYSICIAN ASSISTANT

## 2023-02-16 PROCEDURE — 1036F TOBACCO NON-USER: CPT | Performed by: PHYSICIAN ASSISTANT

## 2023-02-16 RX ORDER — AMOXICILLIN 500 MG/1
2000 CAPSULE ORAL ONCE
Qty: 4 CAPSULE | Refills: 1 | Status: SHIPPED | OUTPATIENT
Start: 2023-02-16 | End: 2023-02-16

## 2023-02-16 NOTE — PROGRESS NOTES
Subjective:      Patient ID: Deepa Ya is a 63 y.o.  female. Here for annual follow up visit. S/P right knee arthroplasty. The date of procedure- 2/12/2020. Surgeon: Allie Guerra   Issues or complaints: mild stiffness      Review of Systems:  I have reviewed the clinically relevant past medical history, medications, allergies, family history, social history, and 13 point Review of Systems from the patient's recent history form & documented any details relevant to today's presenting complaints in the history above. The patient's self-reported past medical history, medications, allergies, family history, social history, and Review of Systems form from today's date have been scanned into the chart under the \"Media\" tab.        Past Medical History:   Diagnosis Date    Allergic rhinitis     Arthritis     Asthma     not currently medically treated    Essential hypertension 02/16/2018    Hyperlipidemia     Neuropathy     Osteoarthritis     Type 2 diabetes mellitus without complication, without long-term current use of insulin ([facility] [address] 04/17/2019    diet controlled    Wears glasses        Family History   Problem Relation Age of Onset    Cancer Mother         pancreatic    Cirrhosis Father     High Blood Pressure Father        Past Surgical History:   Procedure Laterality Date    CARPAL TUNNEL RELEASE Right 07/10/2014    COLONOSCOPY      COLONOSCOPY N/A 10/11/2022    COLONOSCOPY APC AVM performed by Melissa Garcia MD at [facility] Right     rods placed    JOINT REPLACEMENT      KNEE ARTHROSCOPY Right     MENISCECTOMY Right     TOTAL KNEE ARTHROPLASTY Right 02/12/2020    RIGHT TOTAL KNEE REPLACEMENT ROBOTIC ASSISTED performed by Noble Romberg, MD at [address] [place] [address]     Occupational History    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   Tobacco Use    Smoking status: Former     Packs/day: 1.50     Years: 9.00     Pack years: 13.50     Types: Cigarettes     Quit date: 7/7/2009     Years since quitting: 13.6    Smokeless tobacco: Never    Tobacco comments:     quit:  7 years ago   Vaping Use    Vaping Use: Never used   Substance and Sexual Activity    Alcohol use:  Yes     Alcohol/week: 2.0 standard drinks     Types: 1 Cans of beer, 1 Shots of liquor per week     Comment: rare    Drug use: Never    Sexual activity: Never       Current Outpatient Medications   Medication Sig Dispense Refill    atorvastatin (LIPITOR) 40 MG tablet Take 1 tablet by mouth daily 90 tablet 1    amLODIPine (NORVASC) 10 MG tablet Take 1 tablet by mouth daily 90 tablet 2    losartan (COZAAR) 50 MG tablet Take 1 tablet by mouth daily 90 tablet 1    hydroCHLOROthiazide (HYDRODIURIL) 25 MG tablet Take 1 tablet by mouth daily 90 tablet 5    ibuprofen (ADVIL;MOTRIN) 600 MG tablet Take 1 tablet by mouth 3 times daily as needed for Pain With meals 15 tablet 0    acetaminophen (TYLENOL) 500 MG tablet Take 1 tablet by mouth 4 times daily as needed for Pain 28 tablet 0    loratadine (CLARITIN) 10 MG tablet Take 1 tablet by mouth daily 90 tablet 3    montelukast (SINGULAIR) 10 MG tablet Take 1 tablet by mouth daily 90 tablet 3    fluticasone-salmeterol (ADVAIR HFA) 230-21 MCG/ACT inhaler Inhale 2 puffs into the lungs 2 times daily 12 g 3    albuterol sulfate HFA (VENTOLIN HFA) 108 (90 Base) MCG/ACT inhaler Inhale 2 puffs into the lungs 4 times daily as needed for Wheezing 54 g 1    fluticasone (FLONASE) 50 MCG/ACT nasal spray 1 spray by Nasal route daily 16 g 3    vitamin D (ERGOCALCIFEROL) 1.25 MG (94291 UT) CAPS capsule Take 1 capsule by mouth once a week 4 capsule 2    blood glucose test strips (ACCU-CHEK GUIDE) strip 1 each by In Vitro route daily 100 each 1    tiZANidine (ZANAFLEX) 4 MG tablet TAKE ONE TABLET BY MOUTH FOUR TIMES A DAY AS NEEDED FOR SPASM 40 tablet 0    Blood Glucose Monitoring Suppl (ACCU-CHEK GUIDE ME) w/Device KIT CHECK BLOOD SUGAR DAILY 1 kit 0    Lancets MISC 1 each by Does not apply route daily 100 each 1 albuterol-ipratropium (COMBIVENT RESPIMAT)  MCG/ACT AERS inhaler Inhale 1 puff into the lungs every 6 hours as needed for Wheezing 1 each 0    aspirin 81 MG chewable tablet Take 81 mg by mouth daily       Blood Pressure KIT Ck blood pressure once a week 1 kit 0     No current facility-administered medications for this visit. Objective:   She is alert, oriented x 3, pleasant, well nourished, developed and in no acute distress. Resp 16   Ht 5' 1\" (1.549 m)   Wt 202 lb (91.6 kg)   LMP 06/01/2014   BMI 38.17 kg/m²      Examination of the right knee: Inspection of the skin demonstrates a well-healed midline incision. Inspection of the soft tissues without significant swelling or erythema. The overall alignment of the knee is neutral.  There is minimal intra-articular effusion. AROM     Extension 0     Flexion  115   There no pain associated with ROM testing. Pes anserine bursa non-tender to palpation. Patellar tendon non-tender to palpation. Quadriceps tendon non-tender to palpation. Collateral ligaments non-tender to palpation. Popliteal fossa non-tender to palpation. Retro patellar crepitus is not present. There is no instability with varus/valgus stress testing in full extension or 90 degrees of flexion. There is no instability with anterior drawer testing. Extensor Mechanism is intact. X Rays: was performed in the office today:   AP Standing, Lateral and Sunrise Right Knee: There is a right uncemented total knee arthroplasty present. Mild patellar tilt, otherwise the alignment is satisfactory. There are no signs of failure or loosening. Diagnosis:        ICD-10-CM    1. History of total knee arthroplasty, right  Z96.651 XR KNEE RIGHT (3 VIEWS)             Assessment/ Plan:     Assessment:    Clinically and radiographically stable right knee arthroplasty 3 years post op.     Plan:  Medications-discussed the risk and benefits of prophylactic antibiotics for dental and surgical procedures. Rx'd Amoxicllin    PT- A home exercise program was instructed today including ROM exercises and strengthening exercises. The patient verbalized understanding of these exercises as well as the importance of the exercise program to promote return of normal function. If pain intensifies or other problems arise you are to notify the office. Follow up- 1 year   Call or return to clinic if these symptoms worsen or fail to improve as anticipated.

## 2023-03-24 ENCOUNTER — OFFICE VISIT (OUTPATIENT)
Dept: PRIMARY CARE CLINIC | Age: 63
End: 2023-03-24
Payer: MEDICARE

## 2023-03-24 VITALS
HEART RATE: 63 BPM | OXYGEN SATURATION: 94 % | DIASTOLIC BLOOD PRESSURE: 89 MMHG | WEIGHT: 211 LBS | SYSTOLIC BLOOD PRESSURE: 134 MMHG | BODY MASS INDEX: 39.84 KG/M2 | HEIGHT: 61 IN | TEMPERATURE: 97.5 F

## 2023-03-24 DIAGNOSIS — E78.2 MIXED HYPERLIPIDEMIA: ICD-10-CM

## 2023-03-24 DIAGNOSIS — J45.909: ICD-10-CM

## 2023-03-24 DIAGNOSIS — E11.9 TYPE 2 DIABETES MELLITUS WITHOUT COMPLICATION, WITHOUT LONG-TERM CURRENT USE OF INSULIN (HCC): Primary | ICD-10-CM

## 2023-03-24 DIAGNOSIS — I10 PRIMARY HYPERTENSION: ICD-10-CM

## 2023-03-24 PROBLEM — R05.9 COUGH: Status: ACTIVE | Noted: 2023-03-24

## 2023-03-24 LAB — HBA1C MFR BLD: 6.1 %

## 2023-03-24 PROCEDURE — G8417 CALC BMI ABV UP PARAM F/U: HCPCS | Performed by: FAMILY MEDICINE

## 2023-03-24 PROCEDURE — 99214 OFFICE O/P EST MOD 30 MIN: CPT | Performed by: FAMILY MEDICINE

## 2023-03-24 PROCEDURE — G8484 FLU IMMUNIZE NO ADMIN: HCPCS | Performed by: FAMILY MEDICINE

## 2023-03-24 PROCEDURE — 3075F SYST BP GE 130 - 139MM HG: CPT | Performed by: FAMILY MEDICINE

## 2023-03-24 PROCEDURE — 1036F TOBACCO NON-USER: CPT | Performed by: FAMILY MEDICINE

## 2023-03-24 PROCEDURE — 3044F HG A1C LEVEL LT 7.0%: CPT | Performed by: FAMILY MEDICINE

## 2023-03-24 PROCEDURE — 3017F COLORECTAL CA SCREEN DOC REV: CPT | Performed by: FAMILY MEDICINE

## 2023-03-24 PROCEDURE — 2022F DILAT RTA XM EVC RTNOPTHY: CPT | Performed by: FAMILY MEDICINE

## 2023-03-24 PROCEDURE — 3079F DIAST BP 80-89 MM HG: CPT | Performed by: FAMILY MEDICINE

## 2023-03-24 PROCEDURE — G8427 DOCREV CUR MEDS BY ELIG CLIN: HCPCS | Performed by: FAMILY MEDICINE

## 2023-03-24 PROCEDURE — 83036 HEMOGLOBIN GLYCOSYLATED A1C: CPT | Performed by: FAMILY MEDICINE

## 2023-03-24 RX ORDER — FLUTICASONE PROPIONATE 50 MCG
1 SPRAY, SUSPENSION (ML) NASAL DAILY
Qty: 16 G | Refills: 3 | Status: SHIPPED | OUTPATIENT
Start: 2023-03-24 | End: 2024-03-23

## 2023-03-24 RX ORDER — AMLODIPINE BESYLATE 10 MG/1
10 TABLET ORAL DAILY
Qty: 90 TABLET | Refills: 2 | Status: SHIPPED | OUTPATIENT
Start: 2023-03-24

## 2023-03-24 RX ORDER — HYDROCHLOROTHIAZIDE 25 MG/1
25 TABLET ORAL DAILY
Qty: 90 TABLET | Refills: 5 | Status: SHIPPED | OUTPATIENT
Start: 2023-03-24

## 2023-03-24 RX ORDER — BLOOD SUGAR DIAGNOSTIC
1 STRIP MISCELLANEOUS DAILY
Qty: 100 EACH | Refills: 1 | Status: SHIPPED | OUTPATIENT
Start: 2023-03-24

## 2023-03-24 RX ORDER — ATORVASTATIN CALCIUM 40 MG/1
40 TABLET, FILM COATED ORAL DAILY
Qty: 90 TABLET | Refills: 1 | Status: SHIPPED | OUTPATIENT
Start: 2023-03-24

## 2023-03-24 RX ORDER — LOSARTAN POTASSIUM 50 MG/1
50 TABLET ORAL DAILY
Qty: 90 TABLET | Refills: 1 | Status: SHIPPED | OUTPATIENT
Start: 2023-03-24

## 2023-03-24 SDOH — ECONOMIC STABILITY: INCOME INSECURITY: HOW HARD IS IT FOR YOU TO PAY FOR THE VERY BASICS LIKE FOOD, HOUSING, MEDICAL CARE, AND HEATING?: NOT HARD AT ALL

## 2023-03-24 SDOH — ECONOMIC STABILITY: HOUSING INSECURITY
IN THE LAST 12 MONTHS, WAS THERE A TIME WHEN YOU DID NOT HAVE A STEADY PLACE TO SLEEP OR SLEPT IN A SHELTER (INCLUDING NOW)?: NO

## 2023-03-24 SDOH — ECONOMIC STABILITY: FOOD INSECURITY: WITHIN THE PAST 12 MONTHS, YOU WORRIED THAT YOUR FOOD WOULD RUN OUT BEFORE YOU GOT MONEY TO BUY MORE.: NEVER TRUE

## 2023-03-24 SDOH — ECONOMIC STABILITY: FOOD INSECURITY: WITHIN THE PAST 12 MONTHS, THE FOOD YOU BOUGHT JUST DIDN'T LAST AND YOU DIDN'T HAVE MONEY TO GET MORE.: NEVER TRUE

## 2023-03-24 ASSESSMENT — PATIENT HEALTH QUESTIONNAIRE - PHQ9
SUM OF ALL RESPONSES TO PHQ QUESTIONS 1-9: 0
2. FEELING DOWN, DEPRESSED OR HOPELESS: 0
1. LITTLE INTEREST OR PLEASURE IN DOING THINGS: 0
SUM OF ALL RESPONSES TO PHQ9 QUESTIONS 1 & 2: 0
SUM OF ALL RESPONSES TO PHQ QUESTIONS 1-9: 0

## 2023-03-24 ASSESSMENT — ENCOUNTER SYMPTOMS
STRIDOR: 0
COLOR CHANGE: 0
SHORTNESS OF BREATH: 0
EYE DISCHARGE: 0
SINUS PRESSURE: 0
BLOOD IN STOOL: 0
RECTAL PAIN: 0
EYE REDNESS: 0
NAUSEA: 0
DIARRHEA: 0
SORE THROAT: 0
COUGH: 0
CONSTIPATION: 0
WHEEZING: 0
CHOKING: 0
BACK PAIN: 0
CHEST TIGHTNESS: 0
ABDOMINAL DISTENTION: 0
EYE PAIN: 0
VOMITING: 0
APNEA: 0
RHINORRHEA: 0
TROUBLE SWALLOWING: 0
EYE ITCHING: 0
PHOTOPHOBIA: 0

## 2023-03-24 NOTE — PROGRESS NOTES
Ten Evangelista (:  1960) is a 63 y.o. female,Established patient, here for evaluation of the following chief complaint(s):  3 Month Follow-Up         ASSESSMENT/PLAN:  1. Type 2 diabetes mellitus without complication, without long-term current use of insulin (HCC)  Now diabetes is diet controlled  AIC 6.1  -     POCT glycosylated hemoglobin (Hb A1C)  -     blood glucose test strips (ACCU-CHEK GUIDE) strip; 1 each by In Vitro route daily, Disp-100 each, R-1Normal  -     atorvastatin (LIPITOR) 40 MG tablet; Take 1 tablet by mouth daily, Disp-90 tablet, R-1Normal  -     losartan (COZAAR) 50 MG tablet; Take 1 tablet by mouth daily, Disp-90 tablet, R-1Normal  -     Basic Metabolic Panel; Future  -     CBC; Future  2. Primary hypertension    Bp is at goal < 140/90  Low salt diet  Dash diet  Cont meds, get labs done  -     amLODIPine (NORVASC) 10 MG tablet; Take 1 tablet by mouth daily, Disp-90 tablet, R-2Normal  -     hydroCHLOROthiazide (HYDRODIURIL) 25 MG tablet; Take 1 tablet by mouth daily, Disp-90 tablet, R-5Requesting 1 year supplyNormal  -     losartan (COZAAR) 50 MG tablet; Take 1 tablet by mouth daily, Disp-90 tablet, R-1Normal  -     Basic Metabolic Panel; Future  -     CBC; Future  3. Mixed hyperlipidemia  Last LDL 64  goal less than 100 at goal  Last HDL  45 at goal  between 40   -  60  -     atorvastatin (LIPITOR) 40 MG tablet; Take 1 tablet by mouth daily, Disp-90 tablet, R-1Normal  4. Cough  Related to 5 below improved    -     fluticasone (FLONASE) 50 MCG/ACT nasal spray; 1 spray by Nasal route daily, Disp-16 g, R-3Normal  5. Hay fever with asthma  Sxs controlled with  -     fluticasone (FLONASE) 50 MCG/ACT nasal spray; 1 spray by Nasal route daily, Disp-16 g, R-3Normal      No follow-ups on file.          Subjective   SUBJECTIVE/OBJECTIVE:  HPI 63 y.o. female PMH DM  HTN HLD Hay fever & cough related to hay fever and asthma      Hypertension  No chest pain, headache, sob, leg edema, stroke, kidney

## 2023-04-17 ENCOUNTER — TELEMEDICINE (OUTPATIENT)
Dept: PRIMARY CARE CLINIC | Age: 63
End: 2023-04-17
Payer: MEDICARE

## 2023-04-17 DIAGNOSIS — J06.0 ACUTE LARYNGOPHARYNGITIS: Primary | ICD-10-CM

## 2023-04-17 PROCEDURE — 99213 OFFICE O/P EST LOW 20 MIN: CPT | Performed by: INTERNAL MEDICINE

## 2023-04-17 PROCEDURE — 3017F COLORECTAL CA SCREEN DOC REV: CPT | Performed by: INTERNAL MEDICINE

## 2023-04-17 PROCEDURE — G8427 DOCREV CUR MEDS BY ELIG CLIN: HCPCS | Performed by: INTERNAL MEDICINE

## 2023-04-17 RX ORDER — AZITHROMYCIN 250 MG/1
250 TABLET, FILM COATED ORAL SEE ADMIN INSTRUCTIONS
Qty: 6 TABLET | Refills: 0 | Status: SHIPPED | OUTPATIENT
Start: 2023-04-17 | End: 2023-04-22

## 2023-04-17 ASSESSMENT — ENCOUNTER SYMPTOMS
WHEEZING: 0
SORE THROAT: 1
COUGH: 1
CHEST TIGHTNESS: 0
ALLERGIC/IMMUNOLOGIC NEGATIVE: 1
EYES NEGATIVE: 1

## 2023-04-17 NOTE — PROGRESS NOTES
well-developed and well-nourished [x] No apparent distress      [] Abnormal -     Mental status: [x] Alert and awake  [x] Oriented to person/place/time [x] Able to follow commands    [] Abnormal -     Eyes:   EOM    [x]  Normal    [] Abnormal -   Sclera  [x]  Normal    [] Abnormal -          Discharge [x]  None visible   [] Abnormal -     HENT: [x] Normocephalic, atraumatic  [x] Abnormal - nasal hoarse voice, patient points to painful bilateral submandibular glands. [x] Mouth/Throat: Mucous membranes are moist    External Ears [x] Normal  [] Abnormal -    Neck: [x] No visualized mass [] Abnormal -     Pulmonary/Chest: [x] Respiratory effort normal   [x] No visualized signs of difficulty breathing or respiratory distress        [] Abnormal -      Musculoskeletal:   [x] Normal gait with no signs of ataxia         [x] Normal range of motion of neck        [] Abnormal -     Neurological:        [x] No Facial Asymmetry (Cranial nerve 7 motor function) (limited exam due to video visit)          [x] No gaze palsy        [] Abnormal -          Skin:        [x] No significant exanthematous lesions or discoloration noted on facial skin         [] Abnormal -            Psychiatric:       [x] Normal Affect [] Abnormal -        [x] No Hallucinations    Other pertinent observable physical exam findings:-             Ping Cheng, was evaluated through a synchronous (real-time) audio-video encounter. The patient (or guardian if applicable) is aware that this is a billable service, which includes applicable co-pays. This Virtual Visit was conducted with patient's (and/or legal guardian's) consent. The visit was conducted pursuant to the emergency declaration under the [address] [place] [address], [address] [place] [address] authority and the [employer] General Act. Patient identification was verified, and a caregiver was present when appropriate.    The patient was located at Home:

## 2023-04-23 PROBLEM — R05.9 COUGH: Status: RESOLVED | Noted: 2023-03-24 | Resolved: 2023-04-23

## 2023-05-22 ENCOUNTER — TELEPHONE (OUTPATIENT)
Dept: PRIMARY CARE CLINIC | Age: 63
End: 2023-05-22

## 2023-05-22 DIAGNOSIS — J45.21 MILD INTERMITTENT ASTHMA WITH ACUTE EXACERBATION: ICD-10-CM

## 2023-05-22 DIAGNOSIS — E11.9 CONTROLLED TYPE 2 DIABETES MELLITUS WITHOUT COMPLICATION, WITHOUT LONG-TERM CURRENT USE OF INSULIN (HCC): Primary | ICD-10-CM

## 2023-05-22 RX ORDER — METHYLPREDNISOLONE 4 MG/1
TABLET ORAL
Qty: 21 TABLET | Refills: 0 | Status: SHIPPED | OUTPATIENT
Start: 2023-05-22 | End: 2023-05-28

## 2023-05-22 RX ORDER — AZITHROMYCIN 250 MG/1
TABLET, FILM COATED ORAL
Qty: 1 PACKET | Refills: 0 | Status: SHIPPED | OUTPATIENT
Start: 2023-05-22

## 2023-05-22 NOTE — TELEPHONE ENCOUNTER
Pt called in stating that she is currently experiencing a sore throat, cough, chills, runny nose, congestion and wheezing. She took an at home Covid test yesterday and it was negative. She states that her mucous is clear.  She would like something called in to [facility] to treat her symptoms preferably a Z pack    Albuterol inhaler  Z pack sent  Medrol dose tima

## 2023-05-24 DIAGNOSIS — I10 PRIMARY HYPERTENSION: ICD-10-CM

## 2023-05-24 DIAGNOSIS — E11.9 CONTROLLED TYPE 2 DIABETES MELLITUS WITHOUT COMPLICATION, WITHOUT LONG-TERM CURRENT USE OF INSULIN (HCC): ICD-10-CM

## 2023-05-24 DIAGNOSIS — E11.9 TYPE 2 DIABETES MELLITUS WITHOUT COMPLICATION, WITHOUT LONG-TERM CURRENT USE OF INSULIN (HCC): ICD-10-CM

## 2023-05-24 LAB
ANION GAP SERPL CALCULATED.3IONS-SCNC: 10 MMOL/L (ref 3–16)
BUN SERPL-MCNC: 9 MG/DL (ref 7–20)
CALCIUM SERPL-MCNC: 9.4 MG/DL (ref 8.3–10.6)
CHLORIDE SERPL-SCNC: 102 MMOL/L (ref 99–110)
CO2 SERPL-SCNC: 29 MMOL/L (ref 21–32)
CREAT SERPL-MCNC: 0.8 MG/DL (ref 0.6–1.2)
DEPRECATED RDW RBC AUTO: 15.6 % (ref 12.4–15.4)
GFR SERPLBLD CREATININE-BSD FMLA CKD-EPI: >60 ML/MIN/{1.73_M2}
GLUCOSE SERPL-MCNC: 106 MG/DL (ref 70–99)
HCT VFR BLD AUTO: 41.3 % (ref 36–48)
HGB BLD-MCNC: 13 G/DL (ref 12–16)
MCH RBC QN AUTO: 26.7 PG (ref 26–34)
MCHC RBC AUTO-ENTMCNC: 31.5 G/DL (ref 31–36)
MCV RBC AUTO: 84.7 FL (ref 80–100)
PLATELET # BLD AUTO: 243 K/UL (ref 135–450)
PMV BLD AUTO: 9.7 FL (ref 5–10.5)
POTASSIUM SERPL-SCNC: 3.9 MMOL/L (ref 3.5–5.1)
RBC # BLD AUTO: 4.88 M/UL (ref 4–5.2)
SODIUM SERPL-SCNC: 141 MMOL/L (ref 136–145)
WBC # BLD AUTO: 10.2 K/UL (ref 4–11)

## 2023-05-26 ENCOUNTER — TELEPHONE (OUTPATIENT)
Dept: PRIMARY CARE CLINIC | Age: 63
End: 2023-05-26

## 2023-05-26 DIAGNOSIS — J45.41 MODERATE PERSISTENT ASTHMA WITH ACUTE EXACERBATION: Primary | ICD-10-CM

## 2023-05-26 DIAGNOSIS — J45.909: ICD-10-CM

## 2023-05-26 DIAGNOSIS — J20.8 ACUTE BRONCHITIS DUE TO OTHER SPECIFIED ORGANISMS: Primary | ICD-10-CM

## 2023-05-26 DIAGNOSIS — R05.9 COUGH: ICD-10-CM

## 2023-05-26 DIAGNOSIS — J30.2 SEASONAL ALLERGIC RHINITIS, UNSPECIFIED TRIGGER: ICD-10-CM

## 2023-05-26 RX ORDER — MONTELUKAST SODIUM 10 MG/1
10 TABLET ORAL DAILY
Qty: 90 TABLET | Refills: 3 | Status: SHIPPED | OUTPATIENT
Start: 2023-05-26

## 2023-05-26 RX ORDER — CHLOPHEDIANOL HYDROCHLORIDE,PSEUDOEPHEDRINE HYDROCHLORIDE 30; 12.5 MG/5ML; MG/5ML
LIQUID ORAL
Qty: 560 ML | Refills: 0 | Status: SHIPPED | OUTPATIENT
Start: 2023-05-26

## 2023-05-26 RX ORDER — AMOXICILLIN 500 MG/1
TABLET, FILM COATED ORAL
Qty: 30 TABLET | Refills: 0 | Status: SHIPPED | OUTPATIENT
Start: 2023-05-26

## 2023-05-26 RX ORDER — ALBUTEROL SULFATE 90 UG/1
2 AEROSOL, METERED RESPIRATORY (INHALATION) 4 TIMES DAILY PRN
Qty: 54 G | Refills: 1 | Status: SHIPPED | OUTPATIENT
Start: 2023-05-26

## 2023-05-26 RX ORDER — PROMETHAZINE HYDROCHLORIDE AND CODEINE PHOSPHATE 6.25; 1 MG/5ML; MG/5ML
5 SYRUP ORAL EVERY 4 HOURS PRN
Qty: 118 ML | Refills: 0 | Status: SHIPPED
Start: 2023-05-26 | End: 2023-05-26 | Stop reason: CLARIF

## 2023-06-08 PROBLEM — E66.01 SEVERE OBESITY (BMI 35.0-39.9) WITH COMORBIDITY (HCC): Status: ACTIVE | Noted: 2023-06-08

## 2023-07-05 ENCOUNTER — HOSPITAL ENCOUNTER (OUTPATIENT)
Dept: WOMENS IMAGING | Age: 63
Discharge: HOME | End: 2023-07-05
Attending: OBSTETRICS & GYNECOLOGY
Payer: MEDICARE

## 2023-07-05 DIAGNOSIS — Z01.419 WELL WOMAN EXAM WITH ROUTINE GYNECOLOGICAL EXAM: ICD-10-CM

## 2023-07-05 DIAGNOSIS — Z12.31 ENCOUNTER FOR SCREENING MAMMOGRAM FOR MALIGNANT NEOPLASM OF BREAST: ICD-10-CM

## 2023-07-05 PROCEDURE — 77063 BREAST TOMOSYNTHESIS BI: CPT

## 2023-10-06 DIAGNOSIS — B37.9 YEAST INFECTION: Primary | ICD-10-CM

## 2023-10-06 RX ORDER — FLUCONAZOLE 150 MG/1
150 TABLET ORAL ONCE
Qty: 1 TABLET | Refills: 0 | Status: SHIPPED | OUTPATIENT
Start: 2023-10-06 | End: 2023-10-06

## 2023-10-06 NOTE — TELEPHONE ENCOUNTER
Called and spoke to patient informed her of script been called into her pharmacy.      Called in script to [facility] [phone] [facility], [facility] [address] [facility] [place] [phone]    Meds pending please sign

## 2023-10-06 NOTE — TELEPHONE ENCOUNTER
Patient was prescribed estradiol and patient says shes has been very irritated and itching since she has been taking it would like something called over to take care of this.       Patient can be reached at [phone]

## 2023-11-10 ENCOUNTER — OFFICE VISIT (OUTPATIENT)
Dept: PRIMARY CARE CLINIC | Age: 63
End: 2023-11-10
Payer: MEDICARE

## 2023-11-10 VITALS
OXYGEN SATURATION: 97 % | TEMPERATURE: 97.6 F | HEIGHT: 61 IN | SYSTOLIC BLOOD PRESSURE: 140 MMHG | WEIGHT: 199 LBS | HEART RATE: 66 BPM | DIASTOLIC BLOOD PRESSURE: 91 MMHG | BODY MASS INDEX: 37.57 KG/M2

## 2023-11-10 DIAGNOSIS — I10 PRIMARY HYPERTENSION: Primary | ICD-10-CM

## 2023-11-10 DIAGNOSIS — L85.3 DRY SKIN: ICD-10-CM

## 2023-11-10 DIAGNOSIS — E11.40 TYPE 2 DIABETES MELLITUS WITH DIABETIC NEUROPATHY, WITHOUT LONG-TERM CURRENT USE OF INSULIN (HCC): ICD-10-CM

## 2023-11-10 DIAGNOSIS — M18.11 ARTHRITIS OF CARPOMETACARPAL (CMC) JOINT OF RIGHT THUMB: ICD-10-CM

## 2023-11-10 DIAGNOSIS — E78.2 MIXED HYPERLIPIDEMIA: ICD-10-CM

## 2023-11-10 DIAGNOSIS — E11.9 TYPE 2 DIABETES MELLITUS WITHOUT COMPLICATION, WITHOUT LONG-TERM CURRENT USE OF INSULIN (HCC): ICD-10-CM

## 2023-11-10 DIAGNOSIS — R06.83 SNORING: ICD-10-CM

## 2023-11-10 DIAGNOSIS — I10 PRIMARY HYPERTENSION: ICD-10-CM

## 2023-11-10 DIAGNOSIS — G56.01 CARPAL TUNNEL SYNDROME OF RIGHT WRIST: ICD-10-CM

## 2023-11-10 LAB
ALBUMIN SERPL-MCNC: 4.4 G/DL (ref 3.4–5)
ALBUMIN/GLOB SERPL: 1.4 {RATIO} (ref 1.1–2.2)
ALP SERPL-CCNC: 81 U/L (ref 40–129)
ALT SERPL-CCNC: 14 U/L (ref 10–40)
ANION GAP SERPL CALCULATED.3IONS-SCNC: 8 MMOL/L (ref 3–16)
AST SERPL-CCNC: 17 U/L (ref 15–37)
BILIRUB SERPL-MCNC: 0.3 MG/DL (ref 0–1)
BUN SERPL-MCNC: 10 MG/DL (ref 7–20)
CALCIUM SERPL-MCNC: 10.1 MG/DL (ref 8.3–10.6)
CHLORIDE SERPL-SCNC: 104 MMOL/L (ref 99–110)
CHOLEST SERPL-MCNC: 204 MG/DL (ref 0–199)
CO2 SERPL-SCNC: 29 MMOL/L (ref 21–32)
CREAT SERPL-MCNC: 0.9 MG/DL (ref 0.6–1.2)
GFR SERPLBLD CREATININE-BSD FMLA CKD-EPI: >60 ML/MIN/{1.73_M2}
GLUCOSE SERPL-MCNC: 95 MG/DL (ref 70–99)
HDLC SERPL-MCNC: 56 MG/DL (ref 40–60)
LDLC SERPL CALC-MCNC: 119 MG/DL
POTASSIUM SERPL-SCNC: 4.1 MMOL/L (ref 3.5–5.1)
PROT SERPL-MCNC: 7.6 G/DL (ref 6.4–8.2)
SODIUM SERPL-SCNC: 141 MMOL/L (ref 136–145)
TRIGL SERPL-MCNC: 143 MG/DL (ref 0–150)
TSH SERPL DL<=0.005 MIU/L-ACNC: 1.82 UIU/ML (ref 0.27–4.2)
VLDLC SERPL CALC-MCNC: 29 MG/DL

## 2023-11-10 PROCEDURE — 1036F TOBACCO NON-USER: CPT | Performed by: FAMILY MEDICINE

## 2023-11-10 PROCEDURE — 3078F DIAST BP <80 MM HG: CPT | Performed by: FAMILY MEDICINE

## 2023-11-10 PROCEDURE — 3074F SYST BP LT 130 MM HG: CPT | Performed by: FAMILY MEDICINE

## 2023-11-10 PROCEDURE — 2022F DILAT RTA XM EVC RTNOPTHY: CPT | Performed by: FAMILY MEDICINE

## 2023-11-10 PROCEDURE — 99214 OFFICE O/P EST MOD 30 MIN: CPT | Performed by: FAMILY MEDICINE

## 2023-11-10 PROCEDURE — 3017F COLORECTAL CA SCREEN DOC REV: CPT | Performed by: FAMILY MEDICINE

## 2023-11-10 PROCEDURE — 3044F HG A1C LEVEL LT 7.0%: CPT | Performed by: FAMILY MEDICINE

## 2023-11-10 PROCEDURE — G8428 CUR MEDS NOT DOCUMENT: HCPCS | Performed by: FAMILY MEDICINE

## 2023-11-10 PROCEDURE — G8484 FLU IMMUNIZE NO ADMIN: HCPCS | Performed by: FAMILY MEDICINE

## 2023-11-10 PROCEDURE — G8417 CALC BMI ABV UP PARAM F/U: HCPCS | Performed by: FAMILY MEDICINE

## 2023-11-10 RX ORDER — LOSARTAN POTASSIUM 50 MG/1
50 TABLET ORAL DAILY
Qty: 90 TABLET | Refills: 1 | Status: SHIPPED | OUTPATIENT
Start: 2023-11-10

## 2023-11-10 RX ORDER — ATORVASTATIN CALCIUM 40 MG/1
40 TABLET, FILM COATED ORAL DAILY
Qty: 90 TABLET | Refills: 1 | Status: SHIPPED | OUTPATIENT
Start: 2023-11-10

## 2023-11-10 RX ORDER — AMLODIPINE BESYLATE 10 MG/1
10 TABLET ORAL DAILY
Qty: 90 TABLET | Refills: 1 | Status: SHIPPED | OUTPATIENT
Start: 2023-11-10

## 2023-11-10 RX ORDER — ASPIRIN 81 MG/1
81 TABLET ORAL DAILY
Qty: 90 TABLET | Refills: 1 | Status: SHIPPED | OUTPATIENT
Start: 2023-11-10

## 2023-11-10 RX ORDER — HYDROCHLOROTHIAZIDE 25 MG/1
25 TABLET ORAL DAILY
Qty: 90 TABLET | Refills: 1 | Status: SHIPPED | OUTPATIENT
Start: 2023-11-10

## 2023-11-10 NOTE — ASSESSMENT & PLAN NOTE
Unstable: sx and exam c/w recurrent R carpal tunnel AND R thumb OA  -Advised pt to get new wrist brace and wear it  -Can trial Voltaren gel  -Ref to hand therapy  -Not interested in seeing ortho at this time, would like to try conservative measures first, consider ref at next visit

## 2023-11-10 NOTE — PROGRESS NOTES
insulin ([address])  Assessment & Plan:   Stable  -Check A1c  -Cont lifestyle management, counseled pt to cut back on alcohol use  -Cont lipitor + asa81  -Reminded pt to make eye doctor appt  -Foot exam completed today  -Declined flu and prevnar vaccines today  Orders:  -     atorvastatin (LIPITOR) 40 MG tablet; Take 1 tablet by mouth daily, Disp-90 tablet, R-1Normal  -     Hemoglobin A1C; Future  -     Comprehensive Metabolic Panel; Future  -     losartan (COZAAR) 50 MG tablet; Take 1 tablet by mouth daily, Disp-90 tablet, R-1Normal  -     aspirin 81 MG EC tablet; Take 1 tablet by mouth daily, Disp-90 tablet, R-1Normal  -     Microalbumin / Creatinine Urine Ratio; Future  3. Mixed hyperlipidemia  Assessment & Plan:  Stable  -Continue asa81, Lipitor 40mg daily  -Recheck lipid panel due, ordered  Orders:  -     atorvastatin (LIPITOR) 40 MG tablet; Take 1 tablet by mouth daily, Disp-90 tablet, R-1Normal  -     Lipid Panel; Future  -     aspirin 81 MG EC tablet; Take 1 tablet by mouth daily, Disp-90 tablet, R-1Normal  4. Carpal tunnel syndrome of right wrist  Assessment & Plan:  Unstable: sx and exam c/w recurrent R carpal tunnel AND R thumb OA  -Advised pt to get new wrist brace and wear it  -Can trial Voltaren gel  -Ref to hand therapy  -Not interested in seeing ortho at this time, would like to try conservative measures first, consider ref at next visit  Orders:  -     [facility]  -     diclofenac sodium (VOLTAREN) 1 % GEL; Apply 2 g topically 4 times daily as needed for Pain (wrist/thumb pain), Topical, 4 TIMES DAILY PRN Starting Fri 11/10/2023, Disp-50 g, R-0, Normal  5. Arthritis of carpometacarpal [place]) joint of right thumb  Assessment & Plan:   See above  Orders:  -     [facility]  -     diclofenac sodium (VOLTAREN) 1 % GEL;  Apply 2 g topically 4 times daily as needed for Pain (wrist/thumb pain), Topical, 4 TIMES DAILY PRN Starting Fri

## 2023-11-10 NOTE — ASSESSMENT & PLAN NOTE
Borderline: pt did not take BP meds this AM, BP mildly elevated above goal of 130/80. Had one brief (10-15s) episode of CP while laying in bed, nonexertional and atypical for angina (sounds more like GERD) but advised pt if it recurs to let me know or go to ER if severe.  No leg swelling on exam today however pt reports some at end of day, may be related to amlodipine  -Continue amlodipine 10mg daily, losartan 50mg daily, and HCTZ 25mg daily, no changes today bec pt did not take her meds this AM  -Pt has BP cuff at home, advised home BP monitoring with parameters to call if elevated  -Encouraged medication adherence  -Possible untreated EMI may be contributing given hx snoring and episodes of waking up choking in her sleep in the past, ref to sleep medicine  -Check renal function, electrolytes today, Ualb: ordered, advised pt to go to lab to complete today/ASAP, discussed need for lab monitoring for patient's safety and safe medication prescribing, pt agreeable and states they will complete today  -Encouraged continued healthy lifestyle, pt lost 12 pounds since march by cutting out sweetened beverages, walking- good work

## 2023-11-11 LAB
EST. AVERAGE GLUCOSE BLD GHB EST-MCNC: 139.9 MG/DL
HBA1C MFR BLD: 6.5 %

## 2023-11-12 PROBLEM — R06.83 SNORING: Status: ACTIVE | Noted: 2023-11-12

## 2023-11-12 PROBLEM — E11.9 TYPE 2 DIABETES MELLITUS WITHOUT COMPLICATION, WITHOUT LONG-TERM CURRENT USE OF INSULIN (HCC): Status: RESOLVED | Noted: 2019-04-17 | Resolved: 2023-11-12

## 2023-11-12 ASSESSMENT — ENCOUNTER SYMPTOMS
COUGH: 0
SHORTNESS OF BREATH: 0

## 2023-11-15 ENCOUNTER — TELEPHONE (OUTPATIENT)
Dept: PRIMARY CARE CLINIC | Age: 63
End: 2023-11-15

## 2023-11-15 DIAGNOSIS — G56.01 CARPAL TUNNEL SYNDROME OF RIGHT WRIST: Primary | ICD-10-CM

## 2023-11-15 NOTE — TELEPHONE ENCOUNTER
[facility] Pt needs order changed from Physical Therapy to Occupational Therapy. Please advise.

## 2023-11-17 NOTE — RESULT ENCOUNTER NOTE
Dear Ms. Caroel,    Your attached lab results show:  -mildly elevated cholesterol, continue atorvastatin (Lipitor)  -blood sugar test (A1c) is slightly higher than it was the last time we checked (6.5), now just barely in the diabetic range. Please try to limit the sugars/starches/carbohydrates in your diet and cut back on alcohol.  -Normal thyroid, kidney, and liver function  -Normal electrolytes    We can discuss these further at your next visit with me in December, or feel free to mychart with any questions.    Dr. Ball

## 2023-11-29 ENCOUNTER — HOSPITAL ENCOUNTER (OUTPATIENT)
Dept: OCCUPATIONAL THERAPY | Age: 63
Setting detail: THERAPIES SERIES
Discharge: HOME | End: 2023-11-29
Payer: MEDICARE

## 2023-11-29 PROCEDURE — 97110 THERAPEUTIC EXERCISES: CPT

## 2023-11-29 PROCEDURE — 97165 OT EVAL LOW COMPLEX 30 MIN: CPT

## 2023-11-29 PROCEDURE — 97530 THERAPEUTIC ACTIVITIES: CPT

## 2023-11-29 ASSESSMENT — 9 HOLE PEG TEST: TESTTIME_SECONDS: 42.29

## 2023-12-06 ENCOUNTER — HOSPITAL ENCOUNTER (OUTPATIENT)
Dept: OCCUPATIONAL THERAPY | Age: 63
Setting detail: THERAPIES SERIES
Discharge: HOME | End: 2023-12-06
Payer: MEDICARE

## 2023-12-06 PROCEDURE — 97110 THERAPEUTIC EXERCISES: CPT

## 2023-12-06 NOTE — PROGRESS NOTES
Occupational Therapy      Occupational Therapy Daily Treatment Note    Date:  2023    Patient Name:  Wilmer Burroughs     :  1960  MRN: [id number]  Restrictions/Precautions:    Medical/Treatment Diagnosis Information:   Diagnosis: carpal tunnel syndrome  Treatment Diagnosis: decreased strength/ADL status  Insurance/Certification information:   [facility] Medicare  Physician Information:   Dr. Armando Marin of care signed (Y/N):  Y  Visit# / total visits:  2/  Pain level: 0/10 at rest this date, but 9/10 with use      G-Code (if applicable):      Date / Visit # G-Code Applied:  /  QuickDASH Total Score 51   QuickDASH Disability/Symptom Score 90.91 %       Progress Note: []  Yes  [x]  No  Next due by: Visit #10      Subjective: Pt reports pain in R thumb and certain movements especially with bathing and dressing. Pt reports 8-9/10 pain in hand.      Objective Measures:  Eval 23    LUE AROM (degrees)  LUE AROM : WFL  RUE AROM (degrees)  RUE AROM : Exceptions  R Forearm Pron 0-90: 90  R Forearm Supination  0-90: 90  LUE Strength  Gross LUE Strength: WFL  RUE Strength  Gross RUE Strength: WFL  Right Hand Strength -  (lbs)  Trial 1: 40  Trial 2: 30  Trial 3: 25  Average: 31.67  Fine Motor Skills  Right 9 Hole Peg Test Time (secs): 42.29  Hand Assessment Comment  Hand Assessment Comment: pt able to make full fist no pain reported                              Therapeutic Activities:      Therapeutic Exercise:   Exercise/Equipment  Resistance/Repetitions   Other comments   AROM X10 reps thumb flexion, Adduction, circumduction  X10 reps composite fist, finger opposition, wrist flexion/extension, radial and ulnar deviation, and forearm supination/pronation    stretching     Strengthening  X10 reps putty squeezes with green theraputty  X20 reps with gripper in R hand- 4x green rubber bands and 2x red rubber bands with good ability  X20 reps with red digflexor and good ability   3# weights- wrist

## 2023-12-13 ENCOUNTER — APPOINTMENT (OUTPATIENT)
Dept: OCCUPATIONAL THERAPY | Age: 63
End: 2023-12-13
Payer: MEDICARE

## 2023-12-13 ENCOUNTER — HOSPITAL ENCOUNTER (OUTPATIENT)
Dept: OCCUPATIONAL THERAPY | Age: 63
Setting detail: THERAPIES SERIES
End: 2023-12-13
Payer: MEDICARE

## 2023-12-13 ENCOUNTER — OFFICE VISIT (OUTPATIENT)
Dept: SLEEP MEDICINE | Age: 63
End: 2023-12-13
Payer: MEDICARE

## 2023-12-13 VITALS
TEMPERATURE: 97.8 F | RESPIRATION RATE: 18 BRPM | BODY MASS INDEX: 37.72 KG/M2 | OXYGEN SATURATION: 95 % | DIASTOLIC BLOOD PRESSURE: 80 MMHG | HEIGHT: 61 IN | HEART RATE: 82 BPM | SYSTOLIC BLOOD PRESSURE: 115 MMHG | WEIGHT: 199.8 LBS

## 2023-12-13 DIAGNOSIS — J45.40 MODERATE PERSISTENT ASTHMA WITHOUT COMPLICATION: ICD-10-CM

## 2023-12-13 DIAGNOSIS — R06.81 WITNESSED EPISODE OF APNEA: ICD-10-CM

## 2023-12-13 DIAGNOSIS — E66.01 SEVERE OBESITY (BMI 35.0-39.9) WITH COMORBIDITY (HCC): ICD-10-CM

## 2023-12-13 DIAGNOSIS — I10 PRIMARY HYPERTENSION: ICD-10-CM

## 2023-12-13 DIAGNOSIS — R06.89 GASPING FOR BREATH: ICD-10-CM

## 2023-12-13 DIAGNOSIS — G47.33 OSA (OBSTRUCTIVE SLEEP APNEA): Primary | ICD-10-CM

## 2023-12-13 DIAGNOSIS — G47.19 EXCESSIVE DAYTIME SLEEPINESS: ICD-10-CM

## 2023-12-13 DIAGNOSIS — R06.83 SNORING: ICD-10-CM

## 2023-12-13 PROCEDURE — 3074F SYST BP LT 130 MM HG: CPT | Performed by: PSYCHIATRY & NEUROLOGY

## 2023-12-13 PROCEDURE — 1036F TOBACCO NON-USER: CPT | Performed by: PSYCHIATRY & NEUROLOGY

## 2023-12-13 PROCEDURE — G8427 DOCREV CUR MEDS BY ELIG CLIN: HCPCS | Performed by: PSYCHIATRY & NEUROLOGY

## 2023-12-13 PROCEDURE — 3017F COLORECTAL CA SCREEN DOC REV: CPT | Performed by: PSYCHIATRY & NEUROLOGY

## 2023-12-13 PROCEDURE — 99204 OFFICE O/P NEW MOD 45 MIN: CPT | Performed by: PSYCHIATRY & NEUROLOGY

## 2023-12-13 PROCEDURE — 3079F DIAST BP 80-89 MM HG: CPT | Performed by: PSYCHIATRY & NEUROLOGY

## 2023-12-13 PROCEDURE — G8484 FLU IMMUNIZE NO ADMIN: HCPCS | Performed by: PSYCHIATRY & NEUROLOGY

## 2023-12-13 PROCEDURE — G8417 CALC BMI ABV UP PARAM F/U: HCPCS | Performed by: PSYCHIATRY & NEUROLOGY

## 2023-12-13 ASSESSMENT — SLEEP AND FATIGUE QUESTIONNAIRES
ESS TOTAL SCORE: 9
HOW LIKELY ARE YOU TO NOD OFF OR FALL ASLEEP WHILE SITTING AND TALKING TO SOMEONE: 0
HOW LIKELY ARE YOU TO NOD OFF OR FALL ASLEEP WHILE SITTING INACTIVE IN A PUBLIC PLACE: 1
HOW LIKELY ARE YOU TO NOD OFF OR FALL ASLEEP WHILE SITTING QUIETLY AFTER LUNCH WITHOUT ALCOHOL: 2
NECK CIRCUMFERENCE (INCHES): 15.5
HOW LIKELY ARE YOU TO NOD OFF OR FALL ASLEEP IN A CAR, WHILE STOPPED FOR A FEW MINUTES IN TRAFFIC: 0
HOW LIKELY ARE YOU TO NOD OFF OR FALL ASLEEP WHILE LYING DOWN TO REST IN THE AFTERNOON WHEN CIRCUMSTANCES PERMIT: 2
HOW LIKELY ARE YOU TO NOD OFF OR FALL ASLEEP WHEN YOU ARE A PASSENGER IN A CAR FOR AN HOUR WITHOUT A BREAK: 0
HOW LIKELY ARE YOU TO NOD OFF OR FALL ASLEEP WHILE WATCHING TV: 2
HOW LIKELY ARE YOU TO NOD OFF OR FALL ASLEEP WHILE SITTING AND READING: 2

## 2023-12-13 ASSESSMENT — ENCOUNTER SYMPTOMS
EYES NEGATIVE: 1
ALLERGIC/IMMUNOLOGIC NEGATIVE: 1
GASTROINTESTINAL NEGATIVE: 1
APNEA: 1

## 2023-12-13 NOTE — PROGRESS NOTES
counseled to avoid driving and other potentially hazardous circumstances if the patient is experiencing excessive sleepiness. Treatment considerations include the use of nasal CPAP, , In the meantime, the patient should be cautioned to avoid the use of alcohol or other depressant medications because of potential for increasing the duration and severity of apnea and cautioned regarding driving or operating and dangerous equipment if the patient is experiencing daytime sleepiness. .  Most likely treating the EMI will have positive impact on HTN, and Asthma control. We discussed the proportionality between weight and AHI. With 10% weight change, the AHI has a 27% proportionate change. With 20% weight change, the AHI has a 45-50% proportionate change. The Patient accepts referral to bariatrics for further consideration of weight loss methods. Orders Placed This Encounter   Procedures    Ambulatory Referral to Bariatric Surgery    Baseline Diagnostic Sleep Study    Sleep Study with PAP Titration       Return in about 1 year (around 12/13/2024) for F/U between 31 and 90 days from the recieving CPAP.     Fortino Perera MD  [place] [facility] [place]

## 2023-12-15 ENCOUNTER — OFFICE VISIT (OUTPATIENT)
Dept: PRIMARY CARE CLINIC | Age: 63
End: 2023-12-15
Payer: MEDICARE

## 2023-12-15 VITALS
OXYGEN SATURATION: 98 % | WEIGHT: 198.2 LBS | DIASTOLIC BLOOD PRESSURE: 80 MMHG | SYSTOLIC BLOOD PRESSURE: 124 MMHG | BODY MASS INDEX: 37.45 KG/M2 | HEART RATE: 70 BPM

## 2023-12-15 DIAGNOSIS — I10 PRIMARY HYPERTENSION: Primary | ICD-10-CM

## 2023-12-15 DIAGNOSIS — E11.40 TYPE 2 DIABETES MELLITUS WITH DIABETIC NEUROPATHY, WITHOUT LONG-TERM CURRENT USE OF INSULIN (HCC): ICD-10-CM

## 2023-12-15 DIAGNOSIS — G56.01 CARPAL TUNNEL SYNDROME OF RIGHT WRIST: ICD-10-CM

## 2023-12-15 DIAGNOSIS — E66.01 SEVERE OBESITY (BMI 35.0-39.9) WITH COMORBIDITY (HCC): ICD-10-CM

## 2023-12-15 DIAGNOSIS — E78.2 MIXED HYPERLIPIDEMIA: ICD-10-CM

## 2023-12-15 PROCEDURE — 99214 OFFICE O/P EST MOD 30 MIN: CPT | Performed by: FAMILY MEDICINE

## 2023-12-15 PROCEDURE — 2022F DILAT RTA XM EVC RTNOPTHY: CPT | Performed by: FAMILY MEDICINE

## 2023-12-15 PROCEDURE — 3074F SYST BP LT 130 MM HG: CPT | Performed by: FAMILY MEDICINE

## 2023-12-15 PROCEDURE — 3079F DIAST BP 80-89 MM HG: CPT | Performed by: FAMILY MEDICINE

## 2023-12-15 PROCEDURE — G8484 FLU IMMUNIZE NO ADMIN: HCPCS | Performed by: FAMILY MEDICINE

## 2023-12-15 PROCEDURE — 1036F TOBACCO NON-USER: CPT | Performed by: FAMILY MEDICINE

## 2023-12-15 PROCEDURE — 3044F HG A1C LEVEL LT 7.0%: CPT | Performed by: FAMILY MEDICINE

## 2023-12-15 PROCEDURE — 3017F COLORECTAL CA SCREEN DOC REV: CPT | Performed by: FAMILY MEDICINE

## 2023-12-15 PROCEDURE — G8417 CALC BMI ABV UP PARAM F/U: HCPCS | Performed by: FAMILY MEDICINE

## 2023-12-15 PROCEDURE — G8427 DOCREV CUR MEDS BY ELIG CLIN: HCPCS | Performed by: FAMILY MEDICINE

## 2023-12-15 RX ORDER — LANCETS 30 GAUGE
1 EACH MISCELLANEOUS DAILY
Qty: 100 EACH | Refills: 5 | Status: SHIPPED | OUTPATIENT
Start: 2023-12-15

## 2023-12-15 RX ORDER — BLOOD SUGAR DIAGNOSTIC
1 STRIP MISCELLANEOUS DAILY
Qty: 100 EACH | Refills: 5 | Status: SHIPPED | OUTPATIENT
Start: 2023-12-15

## 2023-12-15 NOTE — ASSESSMENT & PLAN NOTE
Improving  -Continue in therapy and with conservative measure for now with Voltaren and wrist brace  -Pt would like to hold off on hand surgeon referral until she has completed therapy

## 2023-12-15 NOTE — ASSESSMENT & PLAN NOTE
Stable  -Continue lifestyle management, pt to start in weight loss program  -Cont lipitor + [id number]

## 2023-12-15 NOTE — PROGRESS NOTES
Syncytial Virus (RSV) Pregnant or age 60 yrs+ (1 - 1-dose 60+ series) Never done    DTaP/Tdap/Td vaccine (2 - Td or Tdap) 03/05/2023    Diabetic foot exam  09/09/2023    Diabetic Alb to Cr ratio (uACR) test  12/09/2023    Annual Wellness Visit (Medicare Advantage)  01/01/2024    Diabetic retinal exam  03/08/2024    Depression Screen  03/24/2024        I have reviewed chart notes available from myself and other providers. I have reviewed and addressed active problems and created or updated the problems list in detail, as needed.    Past Medical Histroy  Past Medical History:   Diagnosis Date    Allergic rhinitis     Arthritis     Asthma     not currently medically treated    Essential hypertension 02/16/2018    Hyperlipidemia     Neuropathy     Osteoarthritis     Type 2 diabetes mellitus without complication, without long-term current use of insulin ([facility]) 04/17/2019    diet controlled    Wears glasses        Allergies   Allergen Reactions    Grass Pollen(K-O-R-T-Swt Hunter)     No Known Allergies        Current Outpatient Medications on File Prior to Visit   Current Outpatient Medications   Medication Instructions    acetaminophen (TYLENOL) 500 mg, Oral, 4 TIMES DAILY PRN    albuterol sulfate HFA (VENTOLIN HFA) 108 (90 Base) MCG/ACT inhaler 2 puffs, Inhalation, 4 TIMES DAILY PRN    albuterol-ipratropium (COMBIVENT RESPIMAT)  MCG/ACT AERS inhaler 1 puff, Inhalation, EVERY 6 HOURS PRN    amLODIPine (NORVASC) 10 mg, Oral, DAILY    aspirin 81 mg, Oral, DAILY    atorvastatin (LIPITOR) 40 mg, Oral, DAILY    Blood Glucose Monitoring Suppl (ACCU-CHEK GUIDE ME) w/Device KIT CHECK BLOOD SUGAR DAILY    blood glucose test strips (ACCU-CHEK GUIDE) strip 1 each, In Vitro, DAILY    Chlophedianol-Pseudoephedrine (RONDEC-D) 12.5-30 MG/5ML LIQD 5 cc 4 times a day for cough    diclofenac sodium (VOLTAREN) 2 g, Topical, 4 TIMES DAILY PRN    Estradiol (YUVAFEM) 10 mcg, Vaginal, TWICE WEEKLY    fluticasone (FLONASE) 50 MCG/ACT nasal

## 2023-12-15 NOTE — ASSESSMENT & PLAN NOTE
Improved, BP now at goal  -Continue amlodipine 10mg daily, losartan 50mg daily, and HCTZ 25mg daily  -Labs UTD, due again after 2/10/24  -F/u with sleep medicine for sleep study

## 2023-12-15 NOTE — PATIENT INSTRUCTIONS
-Get the covid booster    -Continue you medicines    -Follow-up with the sleep study, weight loss program and hand therapy    -Let me know if you want the hand surgery referral if your wrist isn't better

## 2023-12-27 ENCOUNTER — HOSPITAL ENCOUNTER (OUTPATIENT)
Dept: OCCUPATIONAL THERAPY | Age: 63
Setting detail: THERAPIES SERIES
Discharge: HOME | End: 2023-12-27
Payer: MEDICARE

## 2023-12-27 PROCEDURE — 97530 THERAPEUTIC ACTIVITIES: CPT

## 2023-12-27 PROCEDURE — 97018 PARAFFIN BATH THERAPY: CPT

## 2023-12-27 PROCEDURE — 97110 THERAPEUTIC EXERCISES: CPT

## 2023-12-27 NOTE — PROGRESS NOTES
Occupational Therapy        Outpatient Occupational Therapy  [] [facility] [address]   Phone: [phone]   Fax: [phone]   [x] [facility]           Phone: [phone]   Fax: [phone]  [] [address]   Phone: [phone]   Fax: [phone]     Occupational Therapy Progress/Discharge Note  Date: 2023        Patient Name:  Yael Yang    :  1960  MRN: [id number]  Restrictions/Precautions:    Medical/Treatment Diagnosis Information:   Diagnosis: carpal tunnel syndrome  Treatment Diagnosis: decreased strength/ADL status  Insurance/Certification information:   [facility] Medicare  Physician Information:   Dr. Homa Gamez of care signed (Y/N):  Y  Visit# / total visits:    Pain level:      5/10 at rest this date, but 7/10 with use       G-Code (if applicable):      Date / Visit # G-Code Applied:  /  QuickDASH Total Score 51 37   QuickDASH Disability/Symptom Score 90.91 % 59.09 %       Time Period for Report:  972-222  Cancels/No-shows to date:  1    Plan of Care/Treatment to date:  [x] Therapeutic Exercise    [x] Modalities:  [x] Therapeutic Activity     [] Ultrasound  [] Electrical Stimulation  [] Total Motion Release     [] Fluidotherapy [] Dasie Sands  [] Neuromuscular Re-education  [x] Cold/hotpack [] Iontophoresis  [x] Instruction in HEP     Other:  [] Manual Therapy      [x]      Paraffin      [] Aquatic Therapy      []                         Significant Findings At Last Visit/Comments:      Subjective: Pt reports pain 5/10 in R hand this date. Reports significant R hand pain yesterday after cooking.        Objective:  Eval 23    LUE AROM (degrees)  LUE AROM : WFL  RUE AROM (degrees)  RUE AROM : Exceptions  R Forearm Pron 0-90: 90  R Forearm Supination  0-90: 90  LUE Strength  Gross LUE Strength: WFL  RUE Strength  Gross RUE Strength: WFL  Right Hand Strength -  (lbs)  Trial 1: 40  Trial 2: 30  Trial 3: 25  Average: 31.67  Fine Motor Skills  Right 9 Hole Peg Test Time (secs): Split-Thickness Skin Graft Text: The defect edges were debeveled with a #15 scalpel blade.  Given the location of the defect, shape of the defect and the proximity to free margins a split thickness skin graft was deemed most appropriate.  Using a sterile surgical marker, the primary defect shape was transferred to the donor site. The split thickness graft was then harvested.  The skin graft was then placed in the primary defect and oriented appropriately.

## 2023-12-27 NOTE — PROGRESS NOTES
Occupational Therapy      Occupational Therapy Daily Treatment Note    Date:  2023    Patient Name:  Sabrina Polo     :  1960  MRN: [id number]  Restrictions/Precautions:    Medical/Treatment Diagnosis Information:   Diagnosis: carpal tunnel syndrome  Treatment Diagnosis: decreased strength/ADL status  Insurance/Certification information:   [facility] Medicare  Physician Information:   Dr. Tru Paulino of care signed (Y/N):  Y  Visit# / total visits:  4/4  Pain level: 5/10 at rest this date, but 7/10 with use      G-Code (if applicable):      Date / Visit # G-Code Applied:  /  QuickDASH Total Score 51 37   QuickDASH Disability/Symptom Score 90.91 % 59.09 %       Progress Note: [x]  Yes  []  No  Next due by: Visit #10      Subjective: Pt reports pain 5/10 in R hand this date. Reports significant R hand pain yesterday after cooking.      Objective Measures:  Eval 23    LUE AROM (degrees)  LUE AROM : WFL  RUE AROM (degrees)  RUE AROM : Exceptions  R Forearm Pron 0-90: 90  R Forearm Supination  0-90: 90  LUE Strength  Gross LUE Strength: WFL  RUE Strength  Gross RUE Strength: WFL  Right Hand Strength -  (lbs)  Trial 1: 40  Trial 2: 30  Trial 3: 25  Average: 31.67  Fine Motor Skills  Right 9 Hole Peg Test Time (secs): 42.29  Hand Assessment Comment  Hand Assessment Comment: pt able to make full fist no pain reported Eval 23    LUE AROM (degrees)  LUE AROM : WFL  RUE AROM (degrees)  RUE AROM : Exceptions  R Forearm Pron 0-90: 90  R Forearm Supination  0-90: 90  LUE Strength  Gross LUE Strength: WFL  RUE Strength  Gross RUE Strength: WFL  Right Hand Strength -  (lbs)  Trial 1: 41  Trial 2: 34  Trial 3: 37  Average: 37.33  Fine Motor Skills  Right 9 Hole Peg Test Time (secs): 29.82  Hand Assessment Comment  Hand Assessment Comment: pt able to make full fist with pain 7/10 reported in R hand                              Therapeutic Activities:      Therapeutic Exercise:   Exercise/Equipment

## 2024-01-03 ENCOUNTER — HOSPITAL ENCOUNTER (OUTPATIENT)
Dept: OCCUPATIONAL THERAPY | Age: 64
Setting detail: THERAPIES SERIES
Discharge: HOME | End: 2024-01-03
Payer: MEDICARE

## 2024-01-03 PROCEDURE — 97110 THERAPEUTIC EXERCISES: CPT

## 2024-01-03 PROCEDURE — 97018 PARAFFIN BATH THERAPY: CPT

## 2024-01-03 NOTE — PROGRESS NOTES
Occupational Therapy      Occupational Therapy Daily Treatment Note    Date:  1/3/2024    Patient Name:  Aster Mayorga     :  1960  MRN: [id number]  Restrictions/Precautions:    Medical/Treatment Diagnosis Information:   Diagnosis: carpal tunnel syndrome  Treatment Diagnosis: decreased strength/ADL status  Insurance/Certification information:   [facility] Medicare  Physician Information:   Dr. Saida Ball   Plan of care signed (Y/N):  Y  Visit# / total visits:  /,   Pain level: 5/10 at rest this date, but 7/10 with use      G-Code (if applicable):      Date / Visit # G-Code Applied:  /  QuickDASH Total Score 51 37   QuickDASH Disability/Symptom Score 90.91 % 59.09 %       Progress Note: []  Yes  [x]  No  Next due by: Visit #10      Subjective: Pt reports pain 6/10 in R hand this date. Pt also reports stiffness in R hand as well.     Objective Measures:  Eval 23    LUE AROM (degrees)  LUE AROM : WFL  RUE AROM (degrees)  RUE AROM : Exceptions  R Forearm Pron 0-90: 90  R Forearm Supination  0-90: 90  LUE Strength  Gross LUE Strength: WFL  RUE Strength  Gross RUE Strength: WFL  Right Hand Strength -  (lbs)  Trial 1: 40  Trial 2: 30  Trial 3: 25  Average: 31.67  Fine Motor Skills  Right 9 Hole Peg Test Time (secs): 42.29  Hand Assessment Comment  Hand Assessment Comment: pt able to make full fist no pain reported Eval 23    LUE AROM (degrees)  LUE AROM : WFL  RUE AROM (degrees)  RUE AROM : Exceptions  R Forearm Pron 0-90: 90  R Forearm Supination  0-90: 90  LUE Strength  Gross LUE Strength: WFL  RUE Strength  Gross RUE Strength: WFL  Right Hand Strength -  (lbs)  Trial 1: 41  Trial 2: 34  Trial 3: 37  Average: 37.33  Fine Motor Skills  Right 9 Hole Peg Test Time (secs): 29.82  Hand Assessment Comment  Hand Assessment Comment: pt able to make full fist with pain 7/10 reported in R hand                              Therapeutic Activities:      Therapeutic Exercise:   Exercise/Equipment

## 2024-01-03 NOTE — PROGRESS NOTES
Occupational Therapy     Outpatient Occupational Therapy  [] [facility]   Phone: [phone]   Fax: [phone]   [x] [facility]  Phone: [phone]   Fax: [phone]  [] [address]   Phone: [phone]   Fax: [phone]      To:  Dr. Ball       Patient: Aster Mayorga  : 1960  MRN: [id number]  Evaluation Date: 1/3/2024      Diagnosis Information:   Diagnosis: carpal tunnel syndrome  Treatment Diagnosis: decreased strength/ADL status    Occupational Therapy Re-Certification Form  Dear Dr. Ball  The following patient has been evaluated for occupational therapy services and for therapy to continue, Medicare requires monthly physician review of the treatment plan. Please review the attached evaluation and/or summary of the patient's plan of care, and verify that you agree therapy should continue by signing the attached document and sending it back to our office.    Plan of Care/Treatment to date:  [x] Therapeutic Exercise   [x] Modalities:  [x] Therapeutic Activity    [] Ultrasound  [] Electrical Stimulation   [x] Activities of Daily Living    [] Fluidotherapy [] Kinesiotaping  [] Neuromuscular Re-education   [] Iontophoresis [x] Coldpack/hotpack   [x] Instruction in HEP     [] Contrast Bath  [] Manual Therapy     Other:  [] Aquatic Therapy      []       Frequency/Duration:  # Days per week: [x] 1 day # Weeks: [] 1 week [] 5 weeks      [] 2 days   [] 2 weeks [] 6 weeks     [] 3 days   [] 3 weeks [] 7 weeks     [] 4 days   [x] 4 weeks [] 8 weeks    Rehab Potential: [] excellent [x] good [] fair  [] Poor    Patient Goals      Patient goals To decrease pain level in R hand   Short Term Goals      Time Frame for Short Term Goals 4 weeks   Short Term Goal 1 Pt will decrease pain to 5/10 when using to complete ADL/IADL tasks- ongoing    STG 1 Current Status: --   STG Goal 1 Status: --   Short Term Goal 2 Pt will increase  strength by 7#s in order to complete cooking/cleaning tasks- ongoing

## 2024-01-05 ENCOUNTER — HOSPITAL ENCOUNTER (OUTPATIENT)
Dept: SLEEP CENTER | Age: 64
Discharge: HOME | End: 2024-01-05
Attending: PSYCHIATRY & NEUROLOGY

## 2024-01-05 DIAGNOSIS — G47.33 OSA (OBSTRUCTIVE SLEEP APNEA): ICD-10-CM

## 2024-01-10 ENCOUNTER — HOSPITAL ENCOUNTER (OUTPATIENT)
Dept: OCCUPATIONAL THERAPY | Age: 64
Setting detail: THERAPIES SERIES
Discharge: HOME | End: 2024-01-10
Payer: MEDICARE

## 2024-01-10 PROCEDURE — 97110 THERAPEUTIC EXERCISES: CPT

## 2024-01-10 NOTE — PROGRESS NOTES
Occupational Therapy      Occupational Therapy Daily Treatment Note    Date:  1/10/2024    Patient Name:  Aster Mayorga     :  1960  MRN: [id number]  Restrictions/Precautions:    Medical/Treatment Diagnosis Information:   Diagnosis: carpal tunnel syndrome  Treatment Diagnosis: decreased strength/ADL status  Insurance/Certification information:   [facility] Medicare  Physician Information:   Dr. Saida Ball   Plan of care signed (Y/N):  Y  Visit# / total visits:  4/4, 2/4  Pain level: 8/10 at rest this date      G-Code (if applicable):      Date / Visit # G-Code Applied:  /  QuickDASH Total Score 51 37   QuickDASH Disability/Symptom Score 90.91 % 59.09 %       Progress Note: []  Yes  [x]  No  Next due by: Visit #10      Subjective: Pt reports pain 8/10 in R hand this date. Pt also reports stiffness in R hand as well.     Objective Measures:  Eval 23    LUE AROM (degrees)  LUE AROM : WFL  RUE AROM (degrees)  RUE AROM : Exceptions  R Forearm Pron 0-90: 90  R Forearm Supination  0-90: 90  LUE Strength  Gross LUE Strength: WFL  RUE Strength  Gross RUE Strength: WFL  Right Hand Strength -  (lbs)  Trial 1: 40  Trial 2: 30  Trial 3: 25  Average: 31.67  Fine Motor Skills  Right 9 Hole Peg Test Time (secs): 42.29  Hand Assessment Comment  Hand Assessment Comment: pt able to make full fist no pain reported Eval 23    LUE AROM (degrees)  LUE AROM : WFL  RUE AROM (degrees)  RUE AROM : Exceptions  R Forearm Pron 0-90: 90  R Forearm Supination  0-90: 90  LUE Strength  Gross LUE Strength: WFL  RUE Strength  Gross RUE Strength: WFL  Right Hand Strength -  (lbs)  Trial 1: 41  Trial 2: 34  Trial 3: 37  Average: 37.33  Fine Motor Skills  Right 9 Hole Peg Test Time (secs): 29.82  Hand Assessment Comment  Hand Assessment Comment: pt able to make full fist with pain 7/10 reported in R hand                              Therapeutic Activities:      Therapeutic Exercise:   Exercise/Equipment  Resistance/Repetitions

## 2024-01-17 ENCOUNTER — HOSPITAL ENCOUNTER (OUTPATIENT)
Dept: OCCUPATIONAL THERAPY | Age: 64
Setting detail: THERAPIES SERIES
Discharge: HOME | End: 2024-01-17
Payer: MEDICARE

## 2024-01-17 PROCEDURE — 97018 PARAFFIN BATH THERAPY: CPT

## 2024-01-17 PROCEDURE — 97110 THERAPEUTIC EXERCISES: CPT

## 2024-01-17 PROCEDURE — 97530 THERAPEUTIC ACTIVITIES: CPT

## 2024-01-17 NOTE — PROGRESS NOTES
Occupational Therapy        Outpatient Occupational Therapy  [] [facility]   Phone: [phone]   Fax: [phone]   [x] [facility]           Phone: [phone]   Fax: [phone]  [] [address]   Phone: [phone]   Fax: [phone]     Occupational Therapy Progress/Discharge Note  Date: 2024        Patient Name:  Aster Mayorga    :  1960  MRN: [id number]  Restrictions/Precautions:    Medical/Treatment Diagnosis Information:   Diagnosis: carpal tunnel syndrome  Treatment Diagnosis: decreased strength/ADL status  Insurance/Certification information:   [facility] Medicare  Physician Information:   Dr. Saida Ball   Plan of care signed (Y/N):  Y  Visit# / total visits:  , 3/4 (last visit this date)  Pain level:      6/10 at rest this date     G-Code (if applicable):      Date / Visit # G-Code Applied:  /  QuickDASH Total Score 51 37 45   QuickDASH Disability/Symptom Score 90.91 % 59.09 % 77.27 %        Time Period for Report:  486-200   Cancels/No-shows to date:  0    Plan of Care/Treatment to date:  [x] Therapeutic Exercise    [x] Modalities:  [x] Therapeutic Activity     [] Ultrasound  [] Electrical Stimulation  [] Total Motion Release     [] Fluidotherapy [] Kinisiotaping  [] Neuromuscular Re-education  [x] Cold/hotpack [] Iontophoresis  [x] Instruction in HEP     Other:  [] Manual Therapy      []            [] Aquatic Therapy      []                         Significant Findings At Last Visit/Comments:    Subjective: Pt reports pain 6/10 in R hand this date.      Objective Measures:  Eval 23    LUE AROM (degrees)  LUE AROM : WFL  RUE AROM (degrees)  RUE AROM : Exceptions  R Forearm Pron 0-90: 90  R Forearm Supination  0-90: 90  LUE Strength  Gross LUE Strength: WFL  RUE Strength  Gross RUE Strength: WFL  Right Hand Strength -  (lbs)  Trial 1: 40  Trial 2: 30  Trial 3: 25  Average: 31.67  Fine Motor Skills  Right 9 Hole Peg Test Time (secs): 42.29  Hand Assessment Comment  Hand

## 2024-01-17 NOTE — PROGRESS NOTES
Occupational Therapy      Occupational Therapy Daily Treatment Note    Date:  2024    Patient Name:  Aster Mayorga     :  1960  MRN: [id number]  Restrictions/Precautions:    Medical/Treatment Diagnosis Information:   Diagnosis: carpal tunnel syndrome  Treatment Diagnosis: decreased strength/ADL status  Insurance/Certification information:   [facility] Medicare  Physician Information:   Dr. Saida Ball   Plan of care signed (Y/N):  Y  Visit# / total visits:  4/4, 3/4 (last visit this date)  Pain level: 6/10 at rest this date      G-Code (if applicable):      Date / Visit # G-Code Applied:  /  QuickDASH Total Score 51 37 45   QuickDASH Disability/Symptom Score 90.91 % 59.09 % 77.27 %       Progress Note: [x]  Yes  []  No  Next due by: Visit #10      Subjective: Pt reports pain 6/10 in R hand this date.     Objective Measures:  Eval 23    LUE AROM (degrees)  LUE AROM : WFL  RUE AROM (degrees)  RUE AROM : Exceptions  R Forearm Pron 0-90: 90  R Forearm Supination  0-90: 90  LUE Strength  Gross LUE Strength: WFL  RUE Strength  Gross RUE Strength: WFL  Right Hand Strength -  (lbs)  Trial 1: 40  Trial 2: 30  Trial 3: 25  Average: 31.67  Fine Motor Skills  Right 9 Hole Peg Test Time (secs): 42.29  Hand Assessment Comment  Hand Assessment Comment: pt able to make full fist no pain reported Eval 23    LUE AROM (degrees)  LUE AROM : WFL  RUE AROM (degrees)  RUE AROM : Exceptions  R Forearm Pron 0-90: 90  R Forearm Supination  0-90: 90  LUE Strength  Gross LUE Strength: WFL  RUE Strength  Gross RUE Strength: WFL  Right Hand Strength -  (lbs)  Trial 1: 41  Trial 2: 34  Trial 3: 37  Average: 37.33  Fine Motor Skills  Right 9 Hole Peg Test Time (secs): 29.82  Hand Assessment Comment  Hand Assessment Comment: pt able to make full fist with pain 7/10 reported in R hand  Eval 1724    LUE AROM (degrees)  LUE AROM : WFL  RUE AROM (degrees)  RUE AROM : Exceptions  R Forearm Pron 0-90: 90  R Forearm

## 2024-01-24 ENCOUNTER — APPOINTMENT (OUTPATIENT)
Dept: OCCUPATIONAL THERAPY | Age: 64
End: 2024-01-24
Payer: MEDICARE

## 2024-01-31 ENCOUNTER — APPOINTMENT (OUTPATIENT)
Dept: OCCUPATIONAL THERAPY | Age: 64
End: 2024-01-31
Payer: MEDICARE

## 2024-02-29 PROBLEM — I10 PRIMARY HYPERTENSION: Chronic | Status: ACTIVE | Noted: 2018-02-16

## 2024-02-29 PROBLEM — E11.40 TYPE 2 DIABETES MELLITUS WITH DIABETIC NEUROPATHY (HCC): Chronic | Status: ACTIVE | Noted: 2023-11-10

## 2024-02-29 ASSESSMENT — ENCOUNTER SYMPTOMS
SHORTNESS OF BREATH: 0
COUGH: 0

## 2024-03-15 ENCOUNTER — OFFICE VISIT (OUTPATIENT)
Dept: PRIMARY CARE CLINIC | Age: 64
End: 2024-03-15
Payer: MEDICARE

## 2024-03-15 VITALS
WEIGHT: 205.8 LBS | BODY MASS INDEX: 38.86 KG/M2 | HEART RATE: 75 BPM | RESPIRATION RATE: 17 BRPM | OXYGEN SATURATION: 97 % | TEMPERATURE: 97.3 F | SYSTOLIC BLOOD PRESSURE: 133 MMHG | HEIGHT: 61 IN | DIASTOLIC BLOOD PRESSURE: 84 MMHG

## 2024-03-15 DIAGNOSIS — G56.01 CARPAL TUNNEL SYNDROME OF RIGHT WRIST: ICD-10-CM

## 2024-03-15 DIAGNOSIS — E11.40 TYPE 2 DIABETES MELLITUS WITH DIABETIC NEUROPATHY, WITHOUT LONG-TERM CURRENT USE OF INSULIN (HCC): Primary | ICD-10-CM

## 2024-03-15 DIAGNOSIS — E66.01 SEVERE OBESITY (BMI 35.0-39.9) WITH COMORBIDITY (HCC): ICD-10-CM

## 2024-03-15 DIAGNOSIS — I10 PRIMARY HYPERTENSION: Chronic | ICD-10-CM

## 2024-03-15 DIAGNOSIS — E78.2 MIXED HYPERLIPIDEMIA: ICD-10-CM

## 2024-03-15 LAB — HBA1C MFR BLD: 6.4 %

## 2024-03-15 PROCEDURE — 83036 HEMOGLOBIN GLYCOSYLATED A1C: CPT | Performed by: FAMILY MEDICINE

## 2024-03-15 PROCEDURE — 2022F DILAT RTA XM EVC RTNOPTHY: CPT | Performed by: FAMILY MEDICINE

## 2024-03-15 PROCEDURE — 3044F HG A1C LEVEL LT 7.0%: CPT | Performed by: FAMILY MEDICINE

## 2024-03-15 PROCEDURE — 1036F TOBACCO NON-USER: CPT | Performed by: FAMILY MEDICINE

## 2024-03-15 PROCEDURE — 3017F COLORECTAL CA SCREEN DOC REV: CPT | Performed by: FAMILY MEDICINE

## 2024-03-15 PROCEDURE — 99214 OFFICE O/P EST MOD 30 MIN: CPT | Performed by: FAMILY MEDICINE

## 2024-03-15 PROCEDURE — G8427 DOCREV CUR MEDS BY ELIG CLIN: HCPCS | Performed by: FAMILY MEDICINE

## 2024-03-15 PROCEDURE — G8417 CALC BMI ABV UP PARAM F/U: HCPCS | Performed by: FAMILY MEDICINE

## 2024-03-15 PROCEDURE — 3079F DIAST BP 80-89 MM HG: CPT | Performed by: FAMILY MEDICINE

## 2024-03-15 PROCEDURE — G8484 FLU IMMUNIZE NO ADMIN: HCPCS | Performed by: FAMILY MEDICINE

## 2024-03-15 PROCEDURE — 3075F SYST BP GE 130 - 139MM HG: CPT | Performed by: FAMILY MEDICINE

## 2024-03-15 ASSESSMENT — PATIENT HEALTH QUESTIONNAIRE - PHQ9
SUM OF ALL RESPONSES TO PHQ QUESTIONS 1-9: 0
SUM OF ALL RESPONSES TO PHQ9 QUESTIONS 1 & 2: 0
2. FEELING DOWN, DEPRESSED OR HOPELESS: NOT AT ALL
1. LITTLE INTEREST OR PLEASURE IN DOING THINGS: NOT AT ALL

## 2024-03-15 NOTE — ASSESSMENT & PLAN NOTE
BP borderline today  -Continue amlodipine 10mg daily, losartan 50mg daily, and HCTZ 25mg daily  -Check renal function, electrolytes next visit  -Home BPM- if elevated about 130/80s consistently at home, consider increasing regimen at next visit  -Pt did not do sleep study because insurance did not cover it

## 2024-03-15 NOTE — PROGRESS NOTES
29 11/10/2023 11:21 AM    BUN 10 11/10/2023 11:21 AM    CREATININE 0.9 11/10/2023 11:21 AM    GLUCOSE 95 11/10/2023 11:21 AM    CALCIUM 10.1 11/10/2023 11:21 AM       Lab Results   Component Value Date    CHOL 204 (H) 11/10/2023    CHOL 190 02/14/2018    CHOL 204 (H) 05/23/2017     Lab Results   Component Value Date    TRIG 143 11/10/2023    TRIG 157 (H) 02/14/2018    TRIG 153 (H) 05/23/2017     Lab Results   Component Value Date    HDL 56 11/10/2023    HDL 45 12/09/2022    HDL 53 06/25/2021     Lab Results   Component Value Date    LDLCALC 119 (H) 11/10/2023    LDLCALC 64 12/09/2022    LDLCALC 104 (H) 06/25/2021     No results found for: \"VLDL\"  No results found for: \"CHOLHDLRATIO\"     The 10-year ASCVD risk score (Van RAMIREZ, et al., 2019) is: 18.4%    Values used to calculate the score:      Age: 63 years      Sex: Female      Is Non- : Yes      Diabetic: Yes      Tobacco smoker: No      Systolic Blood Pressure: 133 mmHg      Is BP treated: Yes      HDL Cholesterol: 56 mg/dL      Total Cholesterol: 204 mg/dL     ASSESSMENT/PLAN & ORDERS REVIEW    1. Type 2 diabetes mellitus with diabetic neuropathy, without long-term current use of insulin (HCC)  Assessment & Plan:  Stable  -Continue lifestyle management  -Cont lipitor + asa81    Orders:  -     POCT glycosylated hemoglobin (Hb A1C)  -     aspirin 81 MG EC tablet; Take 1 tablet by mouth daily, Disp-90 tablet, R-1Normal  -     atorvastatin (LIPITOR) 40 MG tablet; Take 1 tablet by mouth daily, Disp-90 tablet, R-1Normal  -     losartan (COZAAR) 50 MG tablet; Take 1 tablet by mouth daily, Disp-90 tablet, R-1Normal  2. Primary hypertension  Assessment & Plan:  BP borderline today  -Continue amlodipine 10mg daily, losartan 50mg daily, and HCTZ 25mg daily  -Check renal function, electrolytes next visit  -Home BPM- if elevated about 130/80s consistently at home, consider increasing regimen at next visit  -Pt did not do sleep study because insurance

## 2024-03-15 NOTE — PATIENT INSTRUCTIONS
-Call [facility] for your Tetanus booster vaccine and think about the shingles vaccine    -Check home blood pressures once a week, write down measurements and bring to next visits. If consistently above 130 on top or 90 on bottom we may need to adjust your blood pressure medications at your next visit. Make sure you are checking your blood pressures after resting for 5 minutes, with feet flat on the floor and at least an hour after you take your morning medicines. Limit salt intake    Can you ask the eye doctor to fax your last visit records to your primary care: [phone]    Medicines you are taking:  For blood pressure- amlodipine 10mg daily, losartan 50mg daily, and HCTZ 25mg daily  For cholesterol- atorvastatin 40mg daily, aspirin 81mg daily

## 2024-03-21 DIAGNOSIS — G56.01 CARPAL TUNNEL SYNDROME OF RIGHT WRIST: ICD-10-CM

## 2024-03-21 DIAGNOSIS — M18.11 ARTHRITIS OF CARPOMETACARPAL (CMC) JOINT OF RIGHT THUMB: ICD-10-CM

## 2024-03-21 NOTE — TELEPHONE ENCOUNTER
Medication:   Requested Prescriptions     Pending Prescriptions Disp Refills    diclofenac sodium (VOLTAREN) 1 % GEL 50 g 0     Sig: Apply 2 g topically 4 times daily as needed for Pain (wrist/thumb pain)      Patient Phone Number: [phone] (home)     Last appt: 3/15/2024   Next appt: 6/28/2024

## 2024-04-05 ENCOUNTER — OFFICE VISIT (OUTPATIENT)
Dept: ORTHOPEDIC SURGERY | Age: 64
End: 2024-04-05

## 2024-04-05 VITALS — HEIGHT: 61 IN | BODY MASS INDEX: 38.71 KG/M2 | WEIGHT: 205 LBS | RESPIRATION RATE: 16 BRPM

## 2024-04-05 DIAGNOSIS — M65.4 DE QUERVAIN'S TENOSYNOVITIS: Primary | ICD-10-CM

## 2024-04-05 RX ORDER — TRIAMCINOLONE ACETONIDE 40 MG/ML
40 INJECTION, SUSPENSION INTRA-ARTICULAR; INTRAMUSCULAR ONCE
Status: COMPLETED | OUTPATIENT
Start: 2024-04-05 | End: 2024-04-05

## 2024-04-05 RX ADMIN — TRIAMCINOLONE ACETONIDE 40 MG: 40 INJECTION, SUSPENSION INTRA-ARTICULAR; INTRAMUSCULAR at 14:36

## 2024-04-05 NOTE — PATIENT INSTRUCTIONS
Information & Instructions   After Finger, Hand, Wrist, or Elbow Injection    Alden Saldana MD    You have received an injection of local anesthetic (Bupivicaine without Epinephrine) for comfort & a steroid (Kenalog) for it’s strong anti-inflammatory effects. In order to give the medication a chance to reduce your inflammation and discomfort, it is recommended that you take it easy for a day or so.  You may use your hand and arm as you feel comfortable, but you should avoid highly strenuous activity and heavy use for several days.    Relief from the injection will often not begin for several days, and you may not feel full relief for up to one month.      It is not uncommon to experience some local discomfort or pain at or around the injection site for a few days.  To relieve these symptoms you may do the following if you feel necessary:       Apply ice to the affected area 20 minutes on and 20 minutes off.   Do not apply ice directly to the skin. Use a thin layer (T-shirt, pillowcase, towel, etc.) to protect the skin.     - If allowed by your other medical physicians, you may take -     2 Tylenol extra strength tablets every 4-6 hours       1-2 Aleve tablets twice a day     2-3 Advil tablets two to three times a day    If you are diabetic, the steroid medication may increase your blood sugar, so you are advised to monitor your sugar more closely so you can adjust it accordingly for a few days following your injection.  If you need assistance with the control of you blood sugar, please contact you primary care physician for further advice.    I will request that you please call the office one month after your injection at [phone] if you have not experienced relief of your symptoms (unless I have instructed you otherwise).  If your injection has given you good relief of you symptoms as expected, then you only need to call the office if your symptoms return.

## 2024-04-05 NOTE — PROGRESS NOTES
Administrations This Visit       triamcinolone acetonide (KENALOG-40) injection 40 mg       Admin Date  04/05/2024  14:36 Action  Given Dose  40 mg Route  Intra-artICUlar Site  Wrist Right Administered By  Tanya Saleh MA    Ordering Provider: Alden Saldana MD    Patient Supplied?: No

## 2024-04-05 NOTE — PROGRESS NOTES
Ms. Aster Mayorga is a 63 y.o. right handed woman  who is seen today in Hand Surgical Consultation at the request of Saida Ball MD.    She presents today regarding right wrist symptoms which have been present for approximately 6 months.  A history of antecedent trauma or injury is Absent.  She reports symptoms to include moderate pain along the  right  Radial wrist and distal forearm.  Wrist symptoms are worse with certain twisting or gripping activities.  Symptoms are Frequently worse with use.  She reports moderate pain with thumb extension or pinch. Symptoms are worsening over time.      Previous treatment has included conservative measures, avoidance of bothersome tasks, and OTC medication.  She does not claim relation of her symptoms to her required work activities.  She has not undergone any form of testing.    I have today reviewed with Aster Mayorga the clinically relevant, past medical history, medications, allergies,  family history, social history, and Review Of Systems & I have documented any details relevant to today's presenting complaints in my history above.  Ms. Aster Mayorga's self-reported past medical history, medications, allergies,  family history, social history, and Review Of Systems have been scanned into the chart under the \"Media\" tab.      Physical Exam:  Ms. Aster Mayorga's most recent vitals:  Vitals  Respirations: 16  Height: 154.9 cm (5' 1\")  Weight - Scale: 93 kg (205 lb)    She is well nourished, oriented to person, place & time.  She demonstrates appropriate mood and affect as well as normal gait and station.    Skin: Normal in appearance, Normal Color, and Free of Lesions Bilaterally   Digital range of motion is without significant limitation bilaterally.  Pain accompanies the flexion and extension of the right Thumb.  There is not triggering associated with active thumb motion.  There is not a palpable mass overlying the site of maximal tenderness right radial wrist.  Wrist  Provider at bedside       Tanisha Oates, NENA  10/27/22 2028

## 2024-04-23 RX ORDER — AMLODIPINE BESYLATE 10 MG/1
10 TABLET ORAL DAILY
Qty: 90 TABLET | Refills: 1 | Status: SHIPPED | OUTPATIENT
Start: 2024-04-23

## 2024-04-23 RX ORDER — ATORVASTATIN CALCIUM 40 MG/1
40 TABLET, FILM COATED ORAL DAILY
Qty: 90 TABLET | Refills: 1 | Status: SHIPPED | OUTPATIENT
Start: 2024-04-23

## 2024-04-23 RX ORDER — HYDROCHLOROTHIAZIDE 25 MG/1
25 TABLET ORAL DAILY
Qty: 90 TABLET | Refills: 1 | Status: SHIPPED | OUTPATIENT
Start: 2024-04-23

## 2024-04-23 RX ORDER — ASPIRIN 81 MG/1
81 TABLET ORAL DAILY
Qty: 90 TABLET | Refills: 1 | Status: SHIPPED | OUTPATIENT
Start: 2024-04-23

## 2024-04-23 RX ORDER — LOSARTAN POTASSIUM 50 MG/1
50 TABLET ORAL DAILY
Qty: 90 TABLET | Refills: 1 | Status: SHIPPED | OUTPATIENT
Start: 2024-04-23

## 2024-04-26 ENCOUNTER — PATIENT MESSAGE (OUTPATIENT)
Dept: PRIMARY CARE CLINIC | Age: 64
End: 2024-04-26

## 2024-04-26 ASSESSMENT — ENCOUNTER SYMPTOMS
SHORTNESS OF BREATH: 0
COUGH: 0

## 2024-06-19 ENCOUNTER — OFFICE VISIT (OUTPATIENT)
Dept: ORTHOPEDIC SURGERY | Age: 64
End: 2024-06-19

## 2024-06-19 VITALS — BODY MASS INDEX: 38.51 KG/M2 | HEIGHT: 61 IN | WEIGHT: 204 LBS

## 2024-06-19 DIAGNOSIS — Z96.651 HISTORY OF TOTAL KNEE ARTHROPLASTY, RIGHT: Primary | ICD-10-CM

## 2024-06-19 RX ORDER — AMOXICILLIN 500 MG/1
2000 CAPSULE ORAL ONCE
Qty: 4 CAPSULE | Refills: 1 | Status: SHIPPED | OUTPATIENT
Start: 2024-06-19 | End: 2024-06-19

## 2024-06-19 NOTE — PROGRESS NOTES
tablet Take 1 tablet by mouth 4 times daily as needed for Pain 28 tablet 0     No current facility-administered medications for this visit.         Objective:   She is alert, oriented x 3, pleasant, well nourished, developed and in no acute distress.    Ht 1.549 m (5' 1\")   Wt 92.5 kg (204 lb)   LMP 06/01/2014   BMI 38.55 kg/m²      Examination of the right knee:   Inspection of the skin demonstrates a well-healed midline incision.  Inspection of the soft tissues without significant swelling or erythema.  The overall alignment of the knee is neutral.  There is minimal intra-articular effusion.  AROM     Extension 0     Flexion  120   There no pain associated with ROM testing.   Pes anserine bursa non-tender to palpation.  Patellar tendon non-tender to palpation.  Quadriceps tendon non-tender to palpation.  Collateral ligaments non-tender to palpation.  Popliteal fossa non-tender to palpation.  Retro patellar crepitus is not present.   There is no instability with varus/valgus stress testing in full extension or 90 degrees of flexion.  There is no instability with anterior drawer testing.  Extensor Mechanism is intact.       Intact skin without lacerations or abrasions, no significant erythema, rashes or skin lesions.    No Edema.      X Rays:  performed in the office today:   AP Standing, Lateral and Sunrise Right Knee: There is a right uncemented total knee arthroplasty present. The alignment is satisfactory. Mild patellar tilt.  There are no signs of failure or loosening.     Diagnosis:        ICD-10-CM    1. History of total knee arthroplasty, right  Z96.651 XR KNEE RIGHT (3 VIEWS)             Assessment/ Plan:     Assessment:    Clinically and radiographically stable right knee arthroplasty 4 years post op.    Plan:  Medications-discussed the risk and benefits of prophylactic antibiotics for dental and surgical procedures.    PT- A home exercise program was instructed today including ROM exercises and

## 2024-06-28 ENCOUNTER — OFFICE VISIT (OUTPATIENT)
Dept: PRIMARY CARE CLINIC | Age: 64
End: 2024-06-28
Payer: MEDICARE

## 2024-06-28 VITALS
WEIGHT: 204.4 LBS | BODY MASS INDEX: 38.59 KG/M2 | DIASTOLIC BLOOD PRESSURE: 88 MMHG | TEMPERATURE: 97 F | OXYGEN SATURATION: 96 % | SYSTOLIC BLOOD PRESSURE: 132 MMHG | HEIGHT: 61 IN | HEART RATE: 72 BPM | RESPIRATION RATE: 16 BRPM

## 2024-06-28 DIAGNOSIS — L98.0 PYOGENIC GRANULOMA OF SKIN: ICD-10-CM

## 2024-06-28 DIAGNOSIS — I10 PRIMARY HYPERTENSION: Primary | Chronic | ICD-10-CM

## 2024-06-28 DIAGNOSIS — E11.40 TYPE 2 DIABETES MELLITUS WITH DIABETIC NEUROPATHY, WITHOUT LONG-TERM CURRENT USE OF INSULIN (HCC): ICD-10-CM

## 2024-06-28 DIAGNOSIS — G47.30 BREATHING-RELATED SLEEP DISORDER: ICD-10-CM

## 2024-06-28 DIAGNOSIS — I10 PRIMARY HYPERTENSION: Chronic | ICD-10-CM

## 2024-06-28 LAB
ANION GAP SERPL CALCULATED.3IONS-SCNC: 9 MMOL/L (ref 3–16)
BUN SERPL-MCNC: 9 MG/DL (ref 7–20)
CALCIUM SERPL-MCNC: 10 MG/DL (ref 8.3–10.6)
CHLORIDE SERPL-SCNC: 103 MMOL/L (ref 99–110)
CO2 SERPL-SCNC: 29 MMOL/L (ref 21–32)
CREAT SERPL-MCNC: 0.8 MG/DL (ref 0.6–1.2)
GFR SERPLBLD CREATININE-BSD FMLA CKD-EPI: 83 ML/MIN/{1.73_M2}
GLUCOSE SERPL-MCNC: 105 MG/DL (ref 70–99)
HBA1C MFR BLD: 6.1 %
MAGNESIUM SERPL-MCNC: 2.1 MG/DL (ref 1.8–2.4)
POTASSIUM SERPL-SCNC: 4.7 MMOL/L (ref 3.5–5.1)
SODIUM SERPL-SCNC: 141 MMOL/L (ref 136–145)

## 2024-06-28 PROCEDURE — 3078F DIAST BP <80 MM HG: CPT | Performed by: FAMILY MEDICINE

## 2024-06-28 PROCEDURE — 3017F COLORECTAL CA SCREEN DOC REV: CPT | Performed by: FAMILY MEDICINE

## 2024-06-28 PROCEDURE — 3074F SYST BP LT 130 MM HG: CPT | Performed by: FAMILY MEDICINE

## 2024-06-28 PROCEDURE — G8427 DOCREV CUR MEDS BY ELIG CLIN: HCPCS | Performed by: FAMILY MEDICINE

## 2024-06-28 PROCEDURE — G8417 CALC BMI ABV UP PARAM F/U: HCPCS | Performed by: FAMILY MEDICINE

## 2024-06-28 PROCEDURE — 1036F TOBACCO NON-USER: CPT | Performed by: FAMILY MEDICINE

## 2024-06-28 PROCEDURE — 83036 HEMOGLOBIN GLYCOSYLATED A1C: CPT | Performed by: FAMILY MEDICINE

## 2024-06-28 PROCEDURE — 2022F DILAT RTA XM EVC RTNOPTHY: CPT | Performed by: FAMILY MEDICINE

## 2024-06-28 PROCEDURE — 99214 OFFICE O/P EST MOD 30 MIN: CPT | Performed by: FAMILY MEDICINE

## 2024-06-28 PROCEDURE — 3044F HG A1C LEVEL LT 7.0%: CPT | Performed by: FAMILY MEDICINE

## 2024-06-28 SDOH — ECONOMIC STABILITY: FOOD INSECURITY: WITHIN THE PAST 12 MONTHS, YOU WORRIED THAT YOUR FOOD WOULD RUN OUT BEFORE YOU GOT MONEY TO BUY MORE.: SOMETIMES TRUE

## 2024-06-28 SDOH — ECONOMIC STABILITY: FOOD INSECURITY: WITHIN THE PAST 12 MONTHS, THE FOOD YOU BOUGHT JUST DIDN'T LAST AND YOU DIDN'T HAVE MONEY TO GET MORE.: SOMETIMES TRUE

## 2024-06-28 SDOH — ECONOMIC STABILITY: INCOME INSECURITY: HOW HARD IS IT FOR YOU TO PAY FOR THE VERY BASICS LIKE FOOD, HOUSING, MEDICAL CARE, AND HEATING?: NOT VERY HARD

## 2024-06-28 ASSESSMENT — PATIENT HEALTH QUESTIONNAIRE - PHQ9
3. TROUBLE FALLING OR STAYING ASLEEP: SEVERAL DAYS
SUM OF ALL RESPONSES TO PHQ9 QUESTIONS 1 & 2: 1
SUM OF ALL RESPONSES TO PHQ QUESTIONS 1-9: 7
9. THOUGHTS THAT YOU WOULD BE BETTER OFF DEAD, OR OF HURTING YOURSELF: NOT AT ALL
6. FEELING BAD ABOUT YOURSELF - OR THAT YOU ARE A FAILURE OR HAVE LET YOURSELF OR YOUR FAMILY DOWN: NOT AT ALL
4. FEELING TIRED OR HAVING LITTLE ENERGY: SEVERAL DAYS
8. MOVING OR SPEAKING SO SLOWLY THAT OTHER PEOPLE COULD HAVE NOTICED. OR THE OPPOSITE, BEING SO FIGETY OR RESTLESS THAT YOU HAVE BEEN MOVING AROUND A LOT MORE THAN USUAL: NOT AT ALL
1. LITTLE INTEREST OR PLEASURE IN DOING THINGS: SEVERAL DAYS
2. FEELING DOWN, DEPRESSED OR HOPELESS: NOT AT ALL
7. TROUBLE CONCENTRATING ON THINGS, SUCH AS READING THE NEWSPAPER OR WATCHING TELEVISION: SEVERAL DAYS
SUM OF ALL RESPONSES TO PHQ QUESTIONS 1-9: 7
10. IF YOU CHECKED OFF ANY PROBLEMS, HOW DIFFICULT HAVE THESE PROBLEMS MADE IT FOR YOU TO DO YOUR WORK, TAKE CARE OF THINGS AT HOME, OR GET ALONG WITH OTHER PEOPLE: NOT DIFFICULT AT ALL
SUM OF ALL RESPONSES TO PHQ QUESTIONS 1-9: 7
SUM OF ALL RESPONSES TO PHQ QUESTIONS 1-9: 7

## 2024-06-28 ASSESSMENT — ANXIETY QUESTIONNAIRES
4. TROUBLE RELAXING: NOT AT ALL
1. FEELING NERVOUS, ANXIOUS, OR ON EDGE: NOT AT ALL
2. NOT BEING ABLE TO STOP OR CONTROL WORRYING: NOT AT ALL
5. BEING SO RESTLESS THAT IT IS HARD TO SIT STILL: NOT AT ALL
GAD7 TOTAL SCORE: 3
6. BECOMING EASILY ANNOYED OR IRRITABLE: SEVERAL DAYS
IF YOU CHECKED OFF ANY PROBLEMS ON THIS QUESTIONNAIRE, HOW DIFFICULT HAVE THESE PROBLEMS MADE IT FOR YOU TO DO YOUR WORK, TAKE CARE OF THINGS AT HOME, OR GET ALONG WITH OTHER PEOPLE: NOT DIFFICULT AT ALL
7. FEELING AFRAID AS IF SOMETHING AWFUL MIGHT HAPPEN: SEVERAL DAYS
3. WORRYING TOO MUCH ABOUT DIFFERENT THINGS: SEVERAL DAYS

## 2024-06-28 ASSESSMENT — ENCOUNTER SYMPTOMS
SHORTNESS OF BREATH: 0
COUGH: 0

## 2024-06-28 NOTE — PROGRESS NOTES
2024    Vitals:    24 0846 24 0944 24 0945   BP: 118/74 126/88 132/88   Site:  Right Upper Arm Left Upper Arm   Pulse: 72     Resp: 16     Temp: 97 °F (36.1 °C)     TempSrc: Tympanic     SpO2: 96%     Weight: 92.7 kg (204 lb 6.4 oz)     Height: 1.549 m (5' 1\")         Aster Mayorga (:  1960) is a 64 y.o. female, Established patient, here for evaluation of the following:    Chief complaint(s):  3 Month Follow-Up and Immunizations (Tdap/)      This documentation utilizes problem-oriented charting. Problems discussed today, Assessment, and Plan are listed first, followed by historical information reviewed, exam/objective data reviewed, and review of assessment/orders and follow-up.    PROBLEMS DISCUSSED TODAY, ASSESSMENT, & PLAN    1. Primary hypertension  Overview:  HTN:  BP Readings from Last 3 Encounters:   24 118/74   03/15/24 133/84   12/15/23 124/80     Current Medications/Doses for HTN: amlodipine 10mg daily, losartan 50mg daily, and HCTZ 25mg daily  Took BP meds today?: no, pt states she hasn't taken any of her medications yet today  Adherence: pt reports she skips doses of her medications frequently 3-4 days a week, even more frequently skips HCTZ because it causes her to urinate, states she does not take the HCTZ if she has to leave the house, states she last took the HCTZ last week. Pt states she didn't take any medications over this past weekend Thus-Mon) because she was out of town and \"drinking a lot,\" though denies daily drinking  Home BP monitoring: pt reports home Bps fluctuating 120-150s/80s-90s  Pt at Goal BP: yes, goal <130/80  ROS:denies headache, CP, new vision changes, lightheadedness, SOB  Occasional bilat leg swelling at end of day, improves with leg elevation    Annual labs:  Sodium   Date Value Ref Range Status   11/10/2023 141 136 - 145 mmol/L Final     Potassium   Date Value Ref Range Status   11/10/2023 4.1 3.5 - 5.1 mmol/L Final     Lab Results

## 2024-06-28 NOTE — ASSESSMENT & PLAN NOTE
Unstable: Sleep Apnea Screening score= 7, high risk for EMI   -Prev ref to sleep medicine who rec sleep study but pt unable to complete due to cost/lack of insurance coverage   -Order placed for [employer] [facility] home sleep test, will see if this is more affordable/accessible for pt

## 2024-06-28 NOTE — ASSESSMENT & PLAN NOTE
Unstable: pt not taking medications as prescribed  -STOP HCTZ as pt not taking anyway, not tolerating due to frequent urination  -Continue amlodipine 10mg daily + losartan 50mg daily. Advised consistent adherence  -Check BMP today  -Advised continued HBPM and reviewed parameters to notify me if high/low  -Low salt diet advised  -EMI: + screening, high risk as below. Prev ref to sleep medicine who rec sleep study but pt unable to complete due to cost/lack of insurance coverage. Discussed [employer] [facility] home sleep test, will order to see if this is more affordable/accessible for pt

## 2024-06-28 NOTE — PATIENT INSTRUCTIONS
qualifying individuals. Eligibility requirements apply.    Website: [email or web address]   Phone number: [phone]    Medicare Extra Help Program  What they offer: \"Extra Help\" is a Medicare program to help people with limited income and resources pay Medicare drug coverage (Part D) premiums, deductibles, coinsurance, and other costs  Website: [email or web address]-help   Phone Number: 1-652.967.6583

## 2024-07-17 ENCOUNTER — OFFICE VISIT (OUTPATIENT)
Dept: GYNECOLOGY | Age: 64
End: 2024-07-17
Payer: MEDICARE

## 2024-07-17 VITALS
SYSTOLIC BLOOD PRESSURE: 128 MMHG | HEART RATE: 91 BPM | RESPIRATION RATE: 18 BRPM | DIASTOLIC BLOOD PRESSURE: 80 MMHG | OXYGEN SATURATION: 96 % | HEIGHT: 61 IN | BODY MASS INDEX: 38.33 KG/M2 | WEIGHT: 203 LBS

## 2024-07-17 DIAGNOSIS — Z12.31 ENCOUNTER FOR SCREENING MAMMOGRAM FOR MALIGNANT NEOPLASM OF BREAST: ICD-10-CM

## 2024-07-17 DIAGNOSIS — Z01.419 WELL WOMAN EXAM WITH ROUTINE GYNECOLOGICAL EXAM: Primary | ICD-10-CM

## 2024-07-17 PROCEDURE — 3079F DIAST BP 80-89 MM HG: CPT | Performed by: OBSTETRICS & GYNECOLOGY

## 2024-07-17 PROCEDURE — 3074F SYST BP LT 130 MM HG: CPT | Performed by: OBSTETRICS & GYNECOLOGY

## 2024-07-17 PROCEDURE — 99396 PREV VISIT EST AGE 40-64: CPT | Performed by: OBSTETRICS & GYNECOLOGY

## 2024-07-17 RX ORDER — FLUCONAZOLE 150 MG/1
150 TABLET ORAL ONCE
Qty: 1 TABLET | Refills: 1 | Status: SHIPPED | OUTPATIENT
Start: 2024-07-17 | End: 2024-07-17

## 2024-07-17 ASSESSMENT — ENCOUNTER SYMPTOMS
RESPIRATORY NEGATIVE: 1
GASTROINTESTINAL NEGATIVE: 1
EYES NEGATIVE: 1
ALLERGIC/IMMUNOLOGIC NEGATIVE: 1

## 2024-07-17 NOTE — PROGRESS NOTES
Subjective   Patient ID: Aster Mayorga is a 64 y.o. female.    Patient is here for annual. Patient on amoxicillin for 30 days due to toothache.         Review of Systems   Constitutional: Negative.    HENT: Negative.     Eyes: Negative.    Respiratory: Negative.     Cardiovascular: Negative.    Gastrointestinal: Negative.    Endocrine: Negative.    Genitourinary: Negative.    Musculoskeletal: Negative.    Skin: Negative.    Allergic/Immunologic: Negative.    Neurological: Negative.    Hematological: Negative.    Psychiatric/Behavioral: Negative.          Date of Birth 1960  Past Medical History:   Diagnosis Date    Allergic rhinitis     Arthritis     Asthma     not currently medically treated    Essential hypertension 2018    Hyperlipidemia     Neuropathy     Osteoarthritis     Type 2 diabetes mellitus without complication, without long-term current use of insulin ([facility]) 2019    diet controlled    Wears glasses      Past Surgical History:   Procedure Laterality Date    CARPAL TUNNEL RELEASE Right 07/10/2014    COLONOSCOPY      COLONOSCOPY N/A 10/11/2022    COLONOSCOPY APC AVM performed by Ricardo Muller MD at [facility] ENDOSCOPY    FOOT SURGERY Right     rods placed    JOINT REPLACEMENT      KNEE ARTHROSCOPY Right     MENISCECTOMY Right     TOTAL KNEE ARTHROPLASTY Right 2020    RIGHT TOTAL KNEE REPLACEMENT ROBOTIC ASSISTED performed by George Hopkins MD at [facility] OR     OB History    Para Term  AB Living   6 6 6     6   SAB IAB Ectopic Molar Multiple Live Births           1        # Outcome Date GA Lbr Lior/2nd Weight Sex Delivery Anes PTL Lv   6 Term  40w0d   M Vag-Spont      5 Term  40w0d   M Vag-Spont      4 Term  40w0d   F Vag-Spont      3 Term  40w0d   M Vag-Spont      2 Term  40w0d   F Vag-Spont      1 Term  40w0d   M Vag-Spont        Social History     Socioeconomic History    Marital status: Single     Spouse name: Not on file    Number of children: 6

## 2024-07-24 ENCOUNTER — HOSPITAL ENCOUNTER (OUTPATIENT)
Dept: WOMENS IMAGING | Age: 64
Discharge: HOME | End: 2024-07-24
Payer: MEDICARE

## 2024-07-24 VITALS — WEIGHT: 201 LBS | HEIGHT: 61 IN | BODY MASS INDEX: 37.95 KG/M2

## 2024-07-24 DIAGNOSIS — Z01.419 WELL WOMAN EXAM WITH ROUTINE GYNECOLOGICAL EXAM: ICD-10-CM

## 2024-07-24 DIAGNOSIS — Z12.31 ENCOUNTER FOR SCREENING MAMMOGRAM FOR MALIGNANT NEOPLASM OF BREAST: ICD-10-CM

## 2024-07-24 PROCEDURE — 77063 BREAST TOMOSYNTHESIS BI: CPT

## 2024-07-29 ENCOUNTER — TELEPHONE (OUTPATIENT)
Dept: GYNECOLOGY | Age: 64
End: 2024-07-29

## 2024-07-29 DIAGNOSIS — R92.1 MAMMOGRAPHIC CALCIFICATION FOUND ON DIAGNOSTIC IMAGING OF BREAST: ICD-10-CM

## 2024-07-29 DIAGNOSIS — Z12.31 ENCOUNTER FOR SCREENING MAMMOGRAM FOR MALIGNANT NEOPLASM OF BREAST: Primary | ICD-10-CM

## 2024-07-30 NOTE — TELEPHONE ENCOUNTER
Patient has been made aware order is in system and will call today to schedule. Pt stated she had called on yesterday and order was not there  informed her that it is in there now..

## 2024-08-16 ENCOUNTER — HOSPITAL ENCOUNTER (OUTPATIENT)
Dept: WOMENS IMAGING | Age: 64
Discharge: HOME | End: 2024-08-16
Attending: OBSTETRICS & GYNECOLOGY
Payer: MEDICARE

## 2024-08-16 DIAGNOSIS — R92.8 ABNORMAL MAMMOGRAM: ICD-10-CM

## 2024-08-16 PROCEDURE — G0279 TOMOSYNTHESIS, MAMMO: HCPCS

## 2024-08-27 DIAGNOSIS — R92.8 ABNORMAL MAMMOGRAM: Primary | ICD-10-CM

## 2024-08-29 ENCOUNTER — TELEPHONE (OUTPATIENT)
Dept: GYNECOLOGY | Age: 64
End: 2024-08-29

## 2024-08-29 DIAGNOSIS — R92.8 ABNORMAL MAMMOGRAM: Primary | ICD-10-CM

## 2024-08-29 NOTE — TELEPHONE ENCOUNTER
Central Scheduling called requesting an order for patient. They are requesting an US for Right breast

## 2024-11-06 ENCOUNTER — OFFICE VISIT (OUTPATIENT)
Dept: PRIMARY CARE CLINIC | Age: 64
End: 2024-11-06

## 2024-11-06 VITALS
OXYGEN SATURATION: 94 % | DIASTOLIC BLOOD PRESSURE: 74 MMHG | SYSTOLIC BLOOD PRESSURE: 108 MMHG | HEART RATE: 66 BPM | BODY MASS INDEX: 38.29 KG/M2 | TEMPERATURE: 97.3 F | WEIGHT: 202.8 LBS | HEIGHT: 61 IN

## 2024-11-06 DIAGNOSIS — E78.2 MIXED HYPERLIPIDEMIA: ICD-10-CM

## 2024-11-06 DIAGNOSIS — M17.0 PRIMARY OSTEOARTHRITIS OF BOTH KNEES: ICD-10-CM

## 2024-11-06 DIAGNOSIS — E11.69 TYPE 2 DIABETES MELLITUS WITH HYPERLIPIDEMIA (HCC): ICD-10-CM

## 2024-11-06 DIAGNOSIS — E11.9 DIABETES MELLITUS WITH COINCIDENT HYPERTENSION (HCC): ICD-10-CM

## 2024-11-06 DIAGNOSIS — E55.9 VITAMIN D DEFICIENCY: ICD-10-CM

## 2024-11-06 DIAGNOSIS — R92.8 ABNORMALITY OF RIGHT BREAST ON SCREENING MAMMOGRAM: ICD-10-CM

## 2024-11-06 DIAGNOSIS — E78.5 TYPE 2 DIABETES MELLITUS WITH HYPERLIPIDEMIA (HCC): ICD-10-CM

## 2024-11-06 DIAGNOSIS — I10 DIABETES MELLITUS WITH COINCIDENT HYPERTENSION (HCC): ICD-10-CM

## 2024-11-06 DIAGNOSIS — E11.40 TYPE 2 DIABETES MELLITUS WITH DIABETIC NEUROPATHY, WITHOUT LONG-TERM CURRENT USE OF INSULIN (HCC): Chronic | ICD-10-CM

## 2024-11-06 DIAGNOSIS — E11.40 TYPE 2 DIABETES MELLITUS WITH DIABETIC NEUROPATHY, WITHOUT LONG-TERM CURRENT USE OF INSULIN (HCC): Primary | Chronic | ICD-10-CM

## 2024-11-06 LAB
25(OH)D3 SERPL-MCNC: 24.3 NG/ML
ALBUMIN SERPL-MCNC: 4.4 G/DL (ref 3.4–5)
ALBUMIN/GLOB SERPL: 1.6 {RATIO} (ref 1.1–2.2)
ALP SERPL-CCNC: 82 U/L (ref 40–129)
ALT SERPL-CCNC: 16 U/L (ref 10–40)
ANION GAP SERPL CALCULATED.3IONS-SCNC: 10 MMOL/L (ref 3–16)
AST SERPL-CCNC: 16 U/L (ref 15–37)
BILIRUB DIRECT SERPL-MCNC: 0.2 MG/DL (ref 0–0.3)
BILIRUB INDIRECT SERPL-MCNC: 0.2 MG/DL (ref 0–1)
BILIRUB SERPL-MCNC: 0.4 MG/DL (ref 0–1)
BUN SERPL-MCNC: 10 MG/DL (ref 7–20)
CALCIUM SERPL-MCNC: 10.2 MG/DL (ref 8.3–10.6)
CHLORIDE SERPL-SCNC: 103 MMOL/L (ref 99–110)
CHOLEST SERPL-MCNC: 145 MG/DL (ref 0–199)
CO2 SERPL-SCNC: 29 MMOL/L (ref 21–32)
CREAT SERPL-MCNC: 0.9 MG/DL (ref 0.6–1.2)
GFR SERPLBLD CREATININE-BSD FMLA CKD-EPI: 72 ML/MIN/{1.73_M2}
GLUCOSE SERPL-MCNC: 105 MG/DL (ref 70–99)
HBA1C MFR BLD: 6 %
HDLC SERPL-MCNC: 52 MG/DL (ref 40–60)
LDLC SERPL CALC-MCNC: 71 MG/DL
POTASSIUM SERPL-SCNC: 5 MMOL/L (ref 3.5–5.1)
PROT SERPL-MCNC: 7.2 G/DL (ref 6.4–8.2)
SODIUM SERPL-SCNC: 142 MMOL/L (ref 136–145)
TRIGL SERPL-MCNC: 108 MG/DL (ref 0–150)
TSH SERPL DL<=0.005 MIU/L-ACNC: 1.16 UIU/ML (ref 0.27–4.2)
VLDLC SERPL CALC-MCNC: 22 MG/DL

## 2024-11-06 RX ORDER — ERGOCALCIFEROL 1.25 MG/1
50000 CAPSULE, LIQUID FILLED ORAL WEEKLY
Qty: 4 CAPSULE | Refills: 2 | Status: SHIPPED | OUTPATIENT
Start: 2024-11-06

## 2024-11-06 RX ORDER — ATORVASTATIN CALCIUM 40 MG/1
40 TABLET, FILM COATED ORAL DAILY
Qty: 90 TABLET | Refills: 1 | Status: SHIPPED | OUTPATIENT
Start: 2024-11-06

## 2024-11-06 SDOH — ECONOMIC STABILITY: FOOD INSECURITY: WITHIN THE PAST 12 MONTHS, THE FOOD YOU BOUGHT JUST DIDN'T LAST AND YOU DIDN'T HAVE MONEY TO GET MORE.: NEVER TRUE

## 2024-11-06 SDOH — ECONOMIC STABILITY: FOOD INSECURITY: WITHIN THE PAST 12 MONTHS, YOU WORRIED THAT YOUR FOOD WOULD RUN OUT BEFORE YOU GOT MONEY TO BUY MORE.: NEVER TRUE

## 2024-11-06 SDOH — ECONOMIC STABILITY: INCOME INSECURITY: HOW HARD IS IT FOR YOU TO PAY FOR THE VERY BASICS LIKE FOOD, HOUSING, MEDICAL CARE, AND HEATING?: NOT HARD AT ALL

## 2024-11-06 ASSESSMENT — PATIENT HEALTH QUESTIONNAIRE - PHQ9
SUM OF ALL RESPONSES TO PHQ QUESTIONS 1-9: 0
2. FEELING DOWN, DEPRESSED OR HOPELESS: NOT AT ALL
1. LITTLE INTEREST OR PLEASURE IN DOING THINGS: NOT AT ALL
SUM OF ALL RESPONSES TO PHQ QUESTIONS 1-9: 0
SUM OF ALL RESPONSES TO PHQ9 QUESTIONS 1 & 2: 0

## 2024-11-06 ASSESSMENT — ENCOUNTER SYMPTOMS
CONSTIPATION: 0
VOMITING: 0
SHORTNESS OF BREATH: 0
COUGH: 0
DIARRHEA: 0
NAUSEA: 0

## 2024-11-06 NOTE — PROGRESS NOTES
Aster Mayorga (:  1960) is a 64 y.o. female,Established patient, here for evaluation of the following chief complaint(s):  Establish Care (Previous provider Dr. Ball ), Other (Mammogram ), and Medication Refill (Vitamin D )    Had eye eval in 2024    SUBJECTIVE:  11.24: establish care, prev pt of Dr. Ball    Hypertension:  - goal < 140/90  - Low salt/dash diets discussed  - No muscle aches, muscle weakness, or cramps  Continue amlodipine 10mg daily + losartan 50mg daily    T2DM:  - goal <7  - Recent A1c 6.0 -- at goal  - No polyuria, polydipsia, weight changes or blurred vision      824 -- Mammo results  \"IMPRESSION:  2 groupings of calcifications in the lateral breast which demonstrate  probably benign morphology.  Short interval follow-up is recommended.     BIRADS:  BIRADS - CATEGORY 3     Probably Benign Findings. A short interval follow-up is recommended in 6  months.\"    Asking for cough syrup ii with hypertension, recommended Coricidin    Medication Refill  Pertinent negatives include no chills, coughing, fever, nausea, rash or vomiting.         I have reviewed the chart notes available from myself and other providers. I have reviewed and addressed all active problems and created or updated the problems list in detail, as needed    I have extensively reviewed and reconciled the medication list, discontinued medications not taking or no longer appropriate, and updated the active meds list    OBJECTIVE:  Review of Systems   Constitutional:  Negative for chills and fever.   Respiratory:  Negative for cough and shortness of breath.    Cardiovascular:  Negative for leg swelling.   Gastrointestinal:  Negative for constipation, diarrhea, nausea and vomiting.   Endocrine: Negative for polyuria.   Genitourinary:  Negative for frequency.   Skin:  Negative for rash.       Vitals:    24 1001   BP: 108/74   Site: Right Upper Arm   Position: Sitting   Cuff Size: Large Adult   Pulse: 66

## 2024-11-06 NOTE — PATIENT INSTRUCTIONS
For mucus -- use Mucinex Twice daily  For cough -- look into getting Coricidin cough syrup which is ok to use with hypertension      GENERAL OFFICE POLICIES        Telephone Calls: Messages will be answered within 1-2 business days, unless the provider is out of the office.  If it is urgent a covering provider will answer. (this does not include Medication refills).      MyChart:  We recommend all patients sign up for [employer].  Through this portal you can see your lab results, request refills, schedule appointments, pay your bill and send messages to the office.   [employer] messages will be answered within 1-2 business days unless the provider is out of the office.  For urgent matters, please call the office.    Appointments:  All appointments must be scheduled.  We ask all patients to schedule their next follow up appointment before they leave the office to make sure you will be able to be seen before you run out of medications.  24 hours' notice is required to cancel or reschedule an appointment to avoid being marked as a no show.  You may be dismissed from the practice after 3 no shows.      LATE for Appointment: If you are 15 or more minutes late for your appointment, you may be asked to reschedule.    MA/LAB APPTS: Must be scheduled, cannot accept walk in lab visits.  We only draw labs for patients established in our office.  We only do injections for medications ordered by our office.    Acute Sick Visits:  Nothing other than acute complaint will be addressed at this visit.    TRADITIONAL MEDICARE DOES NOT COVER PHYSICALS  MEDICARE WELLNESS VISITS: These are NOT physicals, but the free annual visit offered by Medicare to discuss wellness issues. Medication refills, checkups, etc. will not be addressed during this visit.    Medication Refills: Refills are handled electronically; please contact your pharmacy for refills even if current refills have been exhausted. If you are on a controlled medication, you will be

## 2024-11-13 DIAGNOSIS — I10 PRIMARY HYPERTENSION: Chronic | ICD-10-CM

## 2024-11-13 DIAGNOSIS — E11.40 TYPE 2 DIABETES MELLITUS WITH DIABETIC NEUROPATHY, WITHOUT LONG-TERM CURRENT USE OF INSULIN (HCC): ICD-10-CM

## 2024-11-15 RX ORDER — ATORVASTATIN CALCIUM 40 MG/1
40 TABLET, FILM COATED ORAL DAILY
Qty: 100 TABLET | Refills: 1 | Status: SHIPPED | OUTPATIENT
Start: 2024-11-15

## 2024-11-15 RX ORDER — LOSARTAN POTASSIUM 50 MG/1
50 TABLET ORAL DAILY
Qty: 100 TABLET | Refills: 1 | Status: SHIPPED | OUTPATIENT
Start: 2024-11-15

## 2024-12-06 ENCOUNTER — TELEPHONE (OUTPATIENT)
Dept: PHARMACY | Facility: CLINIC | Age: 64
End: 2024-12-06

## 2024-12-06 NOTE — TELEPHONE ENCOUNTER
[facility] CLINICAL PHARMACY: ADHERENCE REVIEW  Identified care gap per [employer]: fills at [facility]: Statin adherence    Patient also appears to be prescribed: ACE/ARB(Passed Measure)    ASSESSMENT  STATIN ADHERENCE    Insurance Records claims through 2024 (Prior Year PDC = 64% - FAILED; YTD PDC = 81%; Potential Fail Date: 2024):   ATORVASTATIN TAB 40MG  last filled on 2024 for 100 day supply. Next refill due: 2024    Prescribed si tablet/capsule daily    Per Insurer Portal: last filled on 2024 for 100 day supply.     Per [facility]: last shipped out on 2024 for 100 day supply. will get  100 day supply ready to ship out since past due.    Lab Results   Component Value Date    CHOL 145 2024    TRIG 108 2024    HDL 52 2024     Lab Results   Component Value Date    LDL 71 2024      ALT   Date Value Ref Range Status   2024 16 10 - 40 U/L Final     AST   Date Value Ref Range Status   2024 16 15 - 37 U/L Final     The 10-year ASCVD risk score (Van DK, et al., 2019) is: 8.7%    Values used to calculate the score:      Age: 64 years      Sex: Female      Is Non- : Yes      Diabetic: Yes      Tobacco smoker: No      Systolic Blood Pressure: 108 mmHg      Is BP treated: Yes      HDL Cholesterol: 52 mg/dL      Total Cholesterol: 145 mg/dL     PLAN  Per insurer report, LIS-1 - may be able to receive up to a 100-day supply for the same cost as a 30-day supply.      The following are interventions that have been identified:   Patient OVERDUE refilling ATORVASTATIN TAB 40MG and active on home medication list.   Refill/s of  ATORVASTATIN TAB 40MG READY to ship out from patient's [facility] pharmacy    Attempting to reach patient to review.  Left message asking for return call. Appirio message sent to patient. Letter sent to patient.      Last Visit: 2024  Next Visit: 2025      Vickie Cristina MA  Populations

## 2025-01-22 DIAGNOSIS — I10 PRIMARY HYPERTENSION: Chronic | ICD-10-CM

## 2025-01-22 NOTE — TELEPHONE ENCOUNTER
Patient called requesting refills on amlodipine    [facility] - [place], [place] - [address] -  [phone] - F [phone]  [address] [address]  Phone: [phone]  Fax: [phone]

## 2025-01-22 NOTE — TELEPHONE ENCOUNTER
Medication:   Requested Prescriptions     Pending Prescriptions Disp Refills    amLODIPine (NORVASC) 10 MG tablet 90 tablet 1     Sig: Take 1 tablet by mouth daily        Last Filled:      Patient Phone Number: [phone] (home)     Last appt: 11/6/2024   Next appt: 5/7/2025    Last OARRS:       3/27/2018     5:04 PM   RX Monitoring   Attestation The Prescription Monitoring Report for this patient was reviewed today.   Periodic Controlled Substance Monitoring Possible medication side effects, risk of tolerance/dependence & alternative treatments discussed.   Chronic Pain > 80 MEDD Treatment objectives documented - patient is progressing appropriately.

## 2025-01-23 RX ORDER — AMLODIPINE BESYLATE 10 MG/1
10 TABLET ORAL DAILY
Qty: 90 TABLET | Refills: 1 | Status: SHIPPED | OUTPATIENT
Start: 2025-01-23

## 2025-03-29 DIAGNOSIS — I10 PRIMARY HYPERTENSION: Chronic | ICD-10-CM

## 2025-03-31 NOTE — TELEPHONE ENCOUNTER
Medication:   Requested Prescriptions     Pending Prescriptions Disp Refills    amLODIPine (NORVASC) 10 MG tablet [Pharmacy Med Name: amLODIPine Besylate 10 MG Oral Tablet] 100 tablet 2     Sig: TAKE 1 TABLET BY MOUTH DAILY        Last Filled:      Patient Phone Number: [phone] (home)     Last appt: 11/6/2024   Next appt: 5/7/2025    Last OARRS:       3/27/2018     5:04 PM   RX Monitoring   Attestation The Prescription Monitoring Report for this patient was reviewed today.   Periodic Controlled Substance Monitoring Possible medication side effects, risk of tolerance/dependence & alternative treatments discussed.   Chronic Pain > 80 MEDD Treatment objectives documented - patient is progressing appropriately.

## 2025-04-02 ENCOUNTER — OFFICE VISIT (OUTPATIENT)
Dept: ORTHOPEDIC SURGERY | Age: 65
End: 2025-04-02
Payer: MEDICARE

## 2025-04-02 ENCOUNTER — HOSPITAL ENCOUNTER (OUTPATIENT)
Dept: WOMENS IMAGING | Age: 65
Discharge: HOME | End: 2025-04-02
Attending: FAMILY MEDICINE
Payer: MEDICARE

## 2025-04-02 ENCOUNTER — TELEPHONE (OUTPATIENT)
Dept: ORTHOPEDIC SURGERY | Age: 65
End: 2025-04-02

## 2025-04-02 VITALS — WEIGHT: 202 LBS | HEIGHT: 61 IN | BODY MASS INDEX: 38.14 KG/M2

## 2025-04-02 VITALS — HEIGHT: 61 IN | WEIGHT: 202 LBS | BODY MASS INDEX: 38.14 KG/M2

## 2025-04-02 DIAGNOSIS — Z96.651 HISTORY OF TOTAL KNEE ARTHROPLASTY, RIGHT: Primary | ICD-10-CM

## 2025-04-02 DIAGNOSIS — R92.8 ABNORMALITY OF RIGHT BREAST ON SCREENING MAMMOGRAM: ICD-10-CM

## 2025-04-02 DIAGNOSIS — M17.12 ARTHRITIS OF KNEE, LEFT: ICD-10-CM

## 2025-04-02 PROBLEM — M25.562 LEFT KNEE PAIN: Status: ACTIVE | Noted: 2025-04-02

## 2025-04-02 PROCEDURE — G8427 DOCREV CUR MEDS BY ELIG CLIN: HCPCS | Performed by: PHYSICIAN ASSISTANT

## 2025-04-02 PROCEDURE — 1036F TOBACCO NON-USER: CPT | Performed by: PHYSICIAN ASSISTANT

## 2025-04-02 PROCEDURE — G8417 CALC BMI ABV UP PARAM F/U: HCPCS | Performed by: PHYSICIAN ASSISTANT

## 2025-04-02 PROCEDURE — G0279 TOMOSYNTHESIS, MAMMO: HCPCS

## 2025-04-02 PROCEDURE — 3017F COLORECTAL CA SCREEN DOC REV: CPT | Performed by: PHYSICIAN ASSISTANT

## 2025-04-02 PROCEDURE — 99213 OFFICE O/P EST LOW 20 MIN: CPT | Performed by: PHYSICIAN ASSISTANT

## 2025-04-02 RX ORDER — AMOXICILLIN 500 MG/1
TABLET, FILM COATED ORAL
Qty: 4 TABLET | Refills: 0 | Status: SHIPPED | OUTPATIENT
Start: 2025-04-02

## 2025-04-02 NOTE — PROGRESS NOTES
Subjective:      Patient ID: Aster Mayorga is a 64 y.o.  female.  Here for annual follow up visit.   S/P right knee arthroplasty.   The date of procedure- 2/12/2020.    Surgeon: Kellie   Issues or complaints: Mild stiffness and discomfort in the right knee over the past few months.  She has not been as active during the winter months.    She is also having left knee pain, ache.  History of left knee arthritis.  Last evaluated for left knee complaints 2019.      Review of Systems:  I have reviewed the clinically relevant past medical history, medications, allergies, family history, social history, and 13 point Review of Systems from the patient's recent history form & documented any details relevant to today's presenting complaints in the history above. The patient's self-reported past medical history, medications, allergies, family history, social history, and Review of Systems form from today's date have been scanned into the chart under the \"Media\" tab.       Past Medical History:   Diagnosis Date    Allergic rhinitis     Arthritis     Asthma     not currently medically treated    Essential hypertension 02/16/2018    Hyperlipidemia     Mixed hyperlipidemia 05/24/2017    Lab Results      Component    Value    Date           CHOL    204 (H)    11/10/2023           TRIG    143    11/10/2023           HDL    56    11/10/2023           LDLCALC    119 (H)    11/10/2023        The 10-year ASCVD risk score (Van RAMIREZ, et al., 2019) is: 18.4%    Values used to calculate the score:      Age: 63 years      Sex: Female      Is Non- : Yes      Diabetic    Neuropathy     Osteoarthritis     Type 2 diabetes mellitus without complication, without long-term current use of insulin 04/17/2019    diet controlled    Wears glasses        Family History   Problem Relation Age of Onset    Ovarian Cancer Mother     Cancer Mother         pancreatic    Cirrhosis Father     High Blood Pressure Father     Breast Cancer

## 2025-04-02 NOTE — TELEPHONE ENCOUNTER
PATIENT IS ASKING IF SHE NEED ANTIBIOTIC FOR DENTAL APPT.  -IF [facility] ON [facility].    PATIENT HAD SX ABOUT 5YRS AGO.    PLEASE ADVISE.

## 2025-04-03 RX ORDER — AMLODIPINE BESYLATE 10 MG/1
10 TABLET ORAL DAILY
Qty: 100 TABLET | Refills: 2 | Status: SHIPPED | OUTPATIENT
Start: 2025-04-03

## 2025-04-07 ENCOUNTER — RESULTS FOLLOW-UP (OUTPATIENT)
Dept: PRIMARY CARE CLINIC | Age: 65
End: 2025-04-07

## 2025-04-07 DIAGNOSIS — R92.1 BREAST CALCIFICATION SEEN ON MAMMOGRAM: Primary | ICD-10-CM

## 2025-05-07 ENCOUNTER — OFFICE VISIT (OUTPATIENT)
Dept: PRIMARY CARE CLINIC | Age: 65
End: 2025-05-07
Payer: MEDICARE

## 2025-05-07 VITALS
WEIGHT: 202 LBS | HEART RATE: 70 BPM | HEIGHT: 61 IN | SYSTOLIC BLOOD PRESSURE: 130 MMHG | TEMPERATURE: 97.2 F | BODY MASS INDEX: 38.14 KG/M2 | OXYGEN SATURATION: 95 % | DIASTOLIC BLOOD PRESSURE: 84 MMHG

## 2025-05-07 DIAGNOSIS — Z12.31 SCREENING MAMMOGRAM FOR BREAST CANCER: ICD-10-CM

## 2025-05-07 DIAGNOSIS — E78.5 TYPE 2 DIABETES MELLITUS WITH HYPERLIPIDEMIA (HCC): ICD-10-CM

## 2025-05-07 DIAGNOSIS — E55.9 VITAMIN D INSUFFICIENCY: ICD-10-CM

## 2025-05-07 DIAGNOSIS — G47.33 OSA (OBSTRUCTIVE SLEEP APNEA): ICD-10-CM

## 2025-05-07 DIAGNOSIS — E11.65 TYPE 2 DIABETES MELLITUS WITH HYPERGLYCEMIA, WITHOUT LONG-TERM CURRENT USE OF INSULIN (HCC): ICD-10-CM

## 2025-05-07 DIAGNOSIS — R92.1 BREAST CALCIFICATION SEEN ON MAMMOGRAM: ICD-10-CM

## 2025-05-07 DIAGNOSIS — E11.69 TYPE 2 DIABETES MELLITUS WITH HYPERLIPIDEMIA (HCC): ICD-10-CM

## 2025-05-07 DIAGNOSIS — I10 DIABETES MELLITUS WITH COINCIDENT HYPERTENSION (HCC): ICD-10-CM

## 2025-05-07 DIAGNOSIS — E11.9 DIABETES MELLITUS WITH COINCIDENT HYPERTENSION (HCC): ICD-10-CM

## 2025-05-07 DIAGNOSIS — E66.01 MORBID (SEVERE) OBESITY DUE TO EXCESS CALORIES (HCC): ICD-10-CM

## 2025-05-07 DIAGNOSIS — Z00.00 MEDICARE ANNUAL WELLNESS VISIT, SUBSEQUENT: Primary | ICD-10-CM

## 2025-05-07 PROCEDURE — 99214 OFFICE O/P EST MOD 30 MIN: CPT | Performed by: FAMILY MEDICINE

## 2025-05-07 PROCEDURE — 3075F SYST BP GE 130 - 139MM HG: CPT | Performed by: FAMILY MEDICINE

## 2025-05-07 PROCEDURE — 3017F COLORECTAL CA SCREEN DOC REV: CPT | Performed by: FAMILY MEDICINE

## 2025-05-07 PROCEDURE — 3079F DIAST BP 80-89 MM HG: CPT | Performed by: FAMILY MEDICINE

## 2025-05-07 PROCEDURE — G2211 COMPLEX E/M VISIT ADD ON: HCPCS | Performed by: FAMILY MEDICINE

## 2025-05-07 PROCEDURE — G8417 CALC BMI ABV UP PARAM F/U: HCPCS | Performed by: FAMILY MEDICINE

## 2025-05-07 PROCEDURE — G0439 PPPS, SUBSEQ VISIT: HCPCS | Performed by: FAMILY MEDICINE

## 2025-05-07 PROCEDURE — 3046F HEMOGLOBIN A1C LEVEL >9.0%: CPT | Performed by: FAMILY MEDICINE

## 2025-05-07 PROCEDURE — G8427 DOCREV CUR MEDS BY ELIG CLIN: HCPCS | Performed by: FAMILY MEDICINE

## 2025-05-07 PROCEDURE — 1036F TOBACCO NON-USER: CPT | Performed by: FAMILY MEDICINE

## 2025-05-07 PROCEDURE — 2022F DILAT RTA XM EVC RTNOPTHY: CPT | Performed by: FAMILY MEDICINE

## 2025-05-07 SDOH — ECONOMIC STABILITY: FOOD INSECURITY: WITHIN THE PAST 12 MONTHS, YOU WORRIED THAT YOUR FOOD WOULD RUN OUT BEFORE YOU GOT MONEY TO BUY MORE.: NEVER TRUE

## 2025-05-07 SDOH — ECONOMIC STABILITY: FOOD INSECURITY: WITHIN THE PAST 12 MONTHS, THE FOOD YOU BOUGHT JUST DIDN'T LAST AND YOU DIDN'T HAVE MONEY TO GET MORE.: NEVER TRUE

## 2025-05-07 ASSESSMENT — PATIENT HEALTH QUESTIONNAIRE - PHQ9
SUM OF ALL RESPONSES TO PHQ QUESTIONS 1-9: 0
2. FEELING DOWN, DEPRESSED OR HOPELESS: NOT AT ALL
SUM OF ALL RESPONSES TO PHQ QUESTIONS 1-9: 0
1. LITTLE INTEREST OR PLEASURE IN DOING THINGS: NOT AT ALL

## 2025-05-07 ASSESSMENT — ENCOUNTER SYMPTOMS
COUGH: 0
DIARRHEA: 0
NAUSEA: 0
VOMITING: 0
CONSTIPATION: 0
SHORTNESS OF BREATH: 0

## 2025-05-07 ASSESSMENT — LIFESTYLE VARIABLES
HOW MANY STANDARD DRINKS CONTAINING ALCOHOL DO YOU HAVE ON A TYPICAL DAY: PATIENT DOES NOT DRINK
HOW OFTEN DO YOU HAVE A DRINK CONTAINING ALCOHOL: NEVER

## 2025-05-07 NOTE — PROGRESS NOTES
Medicare Annual Wellness Visit    Aster Mayorga is here for Diabetes (6 month follow up ) and Medicare AWV    Assessment & Plan   Assessment & Plan  Medicare annual wellness visit, subsequent            Type 2 diabetes mellitus with hyperlipidemia (HCC)       Orders:    Hepatic Function Panel; Future    Lipid Panel; Future    Breast calcification seen on mammogram       Orders:    ROMAINE DIGITAL DIAGNOSTIC W OR WO CAD RIGHT; Future    Screening mammogram for breast cancer       Orders:    ROMAINE DIGITAL DIAGNOSTIC W OR WO CAD RIGHT; Future    EMI (obstructive sleep apnea)       Orders:    Catarina Bryant MD, Sleep Medicine, [facility]    Morbid (severe) obesity due to excess calories (E66.01)            Body mass index [BMI] 38.0-38.9, adult (Z68.38)            Diabetes mellitus with coincident hypertension (HCC)       Orders:    Comprehensive Metabolic Panel; Future    Type 2 diabetes mellitus with hyperglycemia, without long-term current use of insulin (HCC)       Orders:    Hemoglobin A1C; Future    Albumin/Creatinine Ratio, Urine; Future    Vitamin D insufficiency       Orders:    Vitamin D 25 Hydroxy; Future       Return in about 6 months (around 11/7/2025) for lab review/ chronic conditions .     Subjective       Patient's complete Health Risk Assessment and screening values have been reviewed and are found in Flowsheets. The following problems were reviewed today and where indicated follow up appointments were made and/or referrals ordered.    Positive Risk Factor Screenings with Interventions:                Abnormal BMI (obese):  Body mass index is 38.17 kg/m². (!) Abnormal  Interventions:  low carbohydrate diet          Vision Screen:  Do you have difficulty driving, watching TV, or doing any of your daily activities because of your eyesight?: No  Have you had an eye exam within the past year?: (!) No  Interventions:   Patient encouraged to make appointment with their eye specialist      Advanced

## 2025-05-07 NOTE — PATIENT INSTRUCTIONS
too late to quit. Try to avoid secondhand smoke too.     Stay at a weight that's healthy for you. Talk to your doctor if you need help losing weight.     Try to get 7 to 9 hours of sleep each night.     Limit alcohol to 2 drinks a day for men and 1 drink a day for women. Too much alcohol can cause health problems.     Manage other health problems such as diabetes, high blood pressure, and high cholesterol. If you think you may have a problem with alcohol or drug use, talk to your doctor.   Medicines    Take your medicines exactly as prescribed. Call your doctor if you think you are having a problem with your medicine.     If your doctor recommends aspirin, take the amount directed each day. Make sure you take aspirin and not another kind of pain reliever, such as acetaminophen (Tylenol).   When should you call for help?   Call 911 if you have symptoms of a heart attack. These may include:    Chest pain or pressure, or a strange feeling in the chest.     Sweating.     Shortness of breath.     Pain, pressure, or a strange feeling in the back, neck, jaw, or upper belly or in one or both shoulders or arms.     Lightheadedness or sudden weakness.     A fast or irregular heartbeat.   After you call 911, the  may tell you to chew 1 adult-strength or 2 to 4 low-dose aspirin. Wait for an ambulance. Do not try to drive yourself.  Watch closely for changes in your health, and be sure to contact your doctor if you have any problems.  Where can you learn more?  Go to https://www.[employer].net/patientEd and enter F075 to learn more about \"A Healthy Heart: Care Instructions.\"  Current as of: July 31, 2024  Content Version: 14.4  © 9192-0923 [employer].   Care instructions adapted under license by [employer]. If you have questions about a medical condition or this instruction, always ask your healthcare professional. [employer] disclaims any warranty or liability for your use of this

## 2025-05-07 NOTE — PROGRESS NOTES
Aster Mayorga (:  1960) is a 64 y.o. female,Established patient, here for evaluation of the following chief complaint(s):  Diabetes (6 month follow up ) and Medicare AWV    Had eye eval in 2024    SUBJECTIVE:  5.7.25:  6 mo follow up    Subjective:  - Annual wellness visit for Medicare.  - Reports being overweight.    Past Medical History:  - Moderate to severe obstructive sleep apnea diagnosed in 2025, with severe oxygen desaturation (oxygen level below 88% for 226 minutes, 63% of the time). Lowest oxygen level was 72%. Positive airway pressure recommended as treatment of choice. Has not yet seen sleep medicine doctor.  - Diabetes mellitus with borderline proteinuria.  - Mammogram showed calcifications requiring follow-up diagnostic mammogram.  - Vitamin D deficiency.  - Cataracts.    Objective:  - BMI: 38  - Kidney function: GFR 90, normal  - Urine creatinine: normal  - Borderline proteinuria    Assessment & Plan:  1. Obstructive Sleep Apnea (moderate to severe)  - Assessment: Moderate to severe obstructive sleep apnea with severe oxygen desaturation  - Investigations planned: None at this time  - Treatment planned: Positive airway pressure recommended as treatment of choice  - Relevant referrals: Advised to call sleep medicine to schedule follow-up appointment    2. Diabetes Mellitus  - Assessment: Diabetes with borderline proteinuria (ratio 29.9)  - Investigations planned: Renal function tests, cholesterol panel  - Treatment planned: Counseled on importance of blood sugar control to prevent progression of proteinuria    3. Obesity  - Assessment: BMI 38  - Treatment planned: Counseled on healthy eating habits, portion control, and benefits of lower carb diet with increased fresh vegetables and fruits    4. Mammogram follow-up  - Investigations planned: Diagnostic mammogram ordered for 2025 to follow up on calcifications    5. Preventive Care  - Investigations planned: Thyroid

## 2025-05-20 ASSESSMENT — SLEEP AND FATIGUE QUESTIONNAIRES
HOW LIKELY ARE YOU TO NOD OFF OR FALL ASLEEP WHILE SITTING AND READING: MODERATE CHANCE OF DOZING
ESS TOTAL SCORE: 7
HOW LIKELY ARE YOU TO NOD OFF OR FALL ASLEEP WHILE SITTING INACTIVE IN A PUBLIC PLACE: WOULD NEVER DOZE
HOW LIKELY ARE YOU TO NOD OFF OR FALL ASLEEP WHILE SITTING QUIETLY AFTER LUNCH WITHOUT ALCOHOL: WOULD NEVER DOZE
HOW LIKELY ARE YOU TO NOD OFF OR FALL ASLEEP WHILE LYING DOWN TO REST IN THE AFTERNOON WHEN CIRCUMSTANCES PERMIT: SLIGHT CHANCE OF DOZING
HOW LIKELY ARE YOU TO NOD OFF OR FALL ASLEEP WHILE SITTING AND TALKING TO SOMEONE: SLIGHT CHANCE OF DOZING
HOW LIKELY ARE YOU TO NOD OFF OR FALL ASLEEP WHILE LYING DOWN TO REST IN THE AFTERNOON WHEN CIRCUMSTANCES PERMIT: SLIGHT CHANCE OF DOZING
HOW LIKELY ARE YOU TO NOD OFF OR FALL ASLEEP WHILE SITTING QUIETLY AFTER LUNCH WITHOUT ALCOHOL: WOULD NEVER DOZE
HOW LIKELY ARE YOU TO NOD OFF OR FALL ASLEEP IN A CAR, WHILE STOPPED FOR A FEW MINUTES IN TRAFFIC: WOULD NEVER DOZE
HOW LIKELY ARE YOU TO NOD OFF OR FALL ASLEEP WHILE SITTING AND TALKING TO SOMEONE: SLIGHT CHANCE OF DOZING
HOW LIKELY ARE YOU TO NOD OFF OR FALL ASLEEP WHEN YOU ARE A PASSENGER IN A CAR FOR AN HOUR WITHOUT A BREAK: MODERATE CHANCE OF DOZING
HOW LIKELY ARE YOU TO NOD OFF OR FALL ASLEEP WHEN YOU ARE A PASSENGER IN A CAR FOR AN HOUR WITHOUT A BREAK: MODERATE CHANCE OF DOZING
HOW LIKELY ARE YOU TO NOD OFF OR FALL ASLEEP WHILE WATCHING TV: SLIGHT CHANCE OF DOZING
HOW LIKELY ARE YOU TO NOD OFF OR FALL ASLEEP WHILE SITTING INACTIVE IN A PUBLIC PLACE: WOULD NEVER DOZE
HOW LIKELY ARE YOU TO NOD OFF OR FALL ASLEEP IN A CAR, WHILE STOPPED FOR A FEW MINUTES IN TRAFFIC: WOULD NEVER DOZE
HOW LIKELY ARE YOU TO NOD OFF OR FALL ASLEEP WHILE WATCHING TV: SLIGHT CHANCE OF DOZING
HOW LIKELY ARE YOU TO NOD OFF OR FALL ASLEEP WHILE SITTING AND READING: MODERATE CHANCE OF DOZING

## 2025-05-21 ENCOUNTER — OFFICE VISIT (OUTPATIENT)
Dept: SLEEP MEDICINE | Age: 65
End: 2025-05-21
Payer: MEDICARE

## 2025-05-21 VITALS
TEMPERATURE: 96.9 F | SYSTOLIC BLOOD PRESSURE: 115 MMHG | BODY MASS INDEX: 37.8 KG/M2 | HEIGHT: 61 IN | HEART RATE: 88 BPM | WEIGHT: 200.2 LBS | OXYGEN SATURATION: 97 % | RESPIRATION RATE: 18 BRPM | DIASTOLIC BLOOD PRESSURE: 70 MMHG

## 2025-05-21 DIAGNOSIS — I10 DIABETES MELLITUS WITH COINCIDENT HYPERTENSION (HCC): ICD-10-CM

## 2025-05-21 DIAGNOSIS — I10 PRIMARY HYPERTENSION: Chronic | ICD-10-CM

## 2025-05-21 DIAGNOSIS — J45.40 MODERATE PERSISTENT ASTHMA WITHOUT COMPLICATION: ICD-10-CM

## 2025-05-21 DIAGNOSIS — E11.9 DIABETES MELLITUS WITH COINCIDENT HYPERTENSION (HCC): ICD-10-CM

## 2025-05-21 DIAGNOSIS — E55.9 VITAMIN D INSUFFICIENCY: ICD-10-CM

## 2025-05-21 DIAGNOSIS — E66.01 SEVERE OBESITY (BMI 35.0-39.9) WITH COMORBIDITY (HCC): ICD-10-CM

## 2025-05-21 DIAGNOSIS — R06.81 WITNESSED EPISODE OF APNEA: ICD-10-CM

## 2025-05-21 DIAGNOSIS — G47.33 OSA (OBSTRUCTIVE SLEEP APNEA): Primary | ICD-10-CM

## 2025-05-21 DIAGNOSIS — E78.5 TYPE 2 DIABETES MELLITUS WITH HYPERLIPIDEMIA (HCC): ICD-10-CM

## 2025-05-21 DIAGNOSIS — E11.65 TYPE 2 DIABETES MELLITUS WITH HYPERGLYCEMIA, WITHOUT LONG-TERM CURRENT USE OF INSULIN (HCC): ICD-10-CM

## 2025-05-21 DIAGNOSIS — G47.19 EXCESSIVE DAYTIME SLEEPINESS: ICD-10-CM

## 2025-05-21 DIAGNOSIS — R06.83 SNORING: ICD-10-CM

## 2025-05-21 DIAGNOSIS — E11.69 TYPE 2 DIABETES MELLITUS WITH HYPERLIPIDEMIA (HCC): ICD-10-CM

## 2025-05-21 LAB
25(OH)D3 SERPL-MCNC: 28.3 NG/ML
ALBUMIN SERPL-MCNC: 4.2 G/DL (ref 3.4–5)
ALBUMIN/GLOB SERPL: 1.5 {RATIO} (ref 1.1–2.2)
ALP SERPL-CCNC: 89 U/L (ref 40–129)
ALT SERPL-CCNC: 16 U/L (ref 10–40)
ANION GAP SERPL CALCULATED.3IONS-SCNC: 9 MMOL/L (ref 3–16)
AST SERPL-CCNC: 18 U/L (ref 15–37)
BILIRUB DIRECT SERPL-MCNC: 0.2 MG/DL (ref 0–0.3)
BILIRUB INDIRECT SERPL-MCNC: 0.2 MG/DL (ref 0–1)
BILIRUB SERPL-MCNC: 0.4 MG/DL (ref 0–1)
BUN SERPL-MCNC: 10 MG/DL (ref 7–20)
CALCIUM SERPL-MCNC: 10 MG/DL (ref 8.3–10.6)
CHLORIDE SERPL-SCNC: 106 MMOL/L (ref 99–110)
CHOLEST SERPL-MCNC: 131 MG/DL (ref 0–199)
CO2 SERPL-SCNC: 28 MMOL/L (ref 21–32)
CREAT SERPL-MCNC: 0.9 MG/DL (ref 0.6–1.2)
CREAT UR-MCNC: 170 MG/DL (ref 28–259)
GFR SERPLBLD CREATININE-BSD FMLA CKD-EPI: 72 ML/MIN/{1.73_M2}
GLUCOSE SERPL-MCNC: 123 MG/DL (ref 70–99)
HDLC SERPL-MCNC: 61 MG/DL (ref 40–60)
LDLC SERPL CALC-MCNC: 44 MG/DL
MICROALBUMIN UR DL<=1MG/L-MCNC: 1.24 MG/DL
MICROALBUMIN/CREAT UR: 7.3 MG/G (ref 0–30)
POTASSIUM SERPL-SCNC: 4.7 MMOL/L (ref 3.5–5.1)
PROT SERPL-MCNC: 7 G/DL (ref 6.4–8.2)
SODIUM SERPL-SCNC: 143 MMOL/L (ref 136–145)
TRIGL SERPL-MCNC: 132 MG/DL (ref 0–150)
VLDLC SERPL CALC-MCNC: 26 MG/DL

## 2025-05-21 PROCEDURE — 1036F TOBACCO NON-USER: CPT | Performed by: PSYCHIATRY & NEUROLOGY

## 2025-05-21 PROCEDURE — 3017F COLORECTAL CA SCREEN DOC REV: CPT | Performed by: PSYCHIATRY & NEUROLOGY

## 2025-05-21 PROCEDURE — 3074F SYST BP LT 130 MM HG: CPT | Performed by: PSYCHIATRY & NEUROLOGY

## 2025-05-21 PROCEDURE — G2211 COMPLEX E/M VISIT ADD ON: HCPCS | Performed by: PSYCHIATRY & NEUROLOGY

## 2025-05-21 PROCEDURE — 36415 COLL VENOUS BLD VENIPUNCTURE: CPT | Performed by: FAMILY MEDICINE

## 2025-05-21 PROCEDURE — 3078F DIAST BP <80 MM HG: CPT | Performed by: PSYCHIATRY & NEUROLOGY

## 2025-05-21 PROCEDURE — 99214 OFFICE O/P EST MOD 30 MIN: CPT | Performed by: PSYCHIATRY & NEUROLOGY

## 2025-05-21 PROCEDURE — G8417 CALC BMI ABV UP PARAM F/U: HCPCS | Performed by: PSYCHIATRY & NEUROLOGY

## 2025-05-21 PROCEDURE — G8427 DOCREV CUR MEDS BY ELIG CLIN: HCPCS | Performed by: PSYCHIATRY & NEUROLOGY

## 2025-05-21 NOTE — PROGRESS NOTES
MD SUMI Yousif Board Certified in Sleep Medicine  Certified in Behavioral Sleep Medicine  Board Certified in Neurology [facility]  [address]   [address]  [place]  [address]  [phone]   [facility]  [address]  [address]  [place], [place] [address]                      [facility]YS[facility]  [address] [address] [phone]    Subjective:     Patient ID: Aster Mayorga is a 64 y.o. female.    Chief Complaint   Patient presents with    Follow-up    Sleep Apnea       HPI:        Aster Mayorga is a 64 y.o. female was seen today as a follow for suspected obstructive sleep apnea. She complains of snoring, snorting, choking, periods of not breathing, tossing and turning, kicking, excessive daytime sleepiness, feels sleepy during the day, take naps during the day.  Has not done the study in 2023 because of insurance coverage.   Had HST 07/14/2024 and had moderate EMI with AHI 27.5/hr with lowest O2 of 63%, but has not used the CPAP.   The Patient scored Holland Sleepiness Score: (Patient-Rptd) 7 on Holland Sleepiness Scale ( more than 10 is indicative of daytime sleepiness)     BP, and Asthma are stable. Has not gained weight pounds since last visit. 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

## 2025-05-21 NOTE — PATIENT INSTRUCTIONS
Orders Placed This Encounter   Procedures    Home Sleep Study     Standing Status:   Future     Expected Date:   5/21/2025     Expiration Date:   5/21/2026     Location For Sleep Study:   [facility] Location:   [facility]    Sleep Study with PAP Titration     Standing Status:   Future     Expected Date:   5/21/2025     Expiration Date:   5/21/2026     Sleep Study Titration Type:   CPAP     Location For Sleep Study:   [facility] Location:   [facility]

## 2025-05-22 ENCOUNTER — RESULTS FOLLOW-UP (OUTPATIENT)
Dept: PRIMARY CARE CLINIC | Age: 65
End: 2025-05-22

## 2025-05-22 LAB
EST. AVERAGE GLUCOSE BLD GHB EST-MCNC: 134.1 MG/DL
HBA1C MFR BLD: 6.3 %

## 2025-05-28 ENCOUNTER — HOSPITAL ENCOUNTER (OUTPATIENT)
Dept: SLEEP CENTER | Age: 65
Discharge: HOME | End: 2025-05-28
Attending: PSYCHIATRY & NEUROLOGY
Payer: MEDICARE

## 2025-05-28 DIAGNOSIS — G47.33 OSA (OBSTRUCTIVE SLEEP APNEA): ICD-10-CM

## 2025-05-28 PROCEDURE — 95806 SLEEP STUDY UNATT&RESP EFFT: CPT

## 2025-06-02 ENCOUNTER — TELEPHONE (OUTPATIENT)
Dept: PULMONOLOGY | Age: 65
End: 2025-06-02

## 2025-06-02 NOTE — TELEPHONE ENCOUNTER
HST Sleep study showed severe EMI (on a scale of mild, moderate and severe).  AHI was 47.5  per hr. (Average times per hr breathing was obstructed).  O2 Desaturations to 78% (lowest o2)   Dr Recommends:    Follow up with the patient's sleep physician to discuss results      Avoid sedatives, alcohol and caffeinated drinks at bedtime.    Recommend:  titration     Sleep Lab used: [facility]    Avoid driving while sleepy.     Pt notified of results    Transferred to the sleep lab

## 2025-06-25 ENCOUNTER — HOSPITAL ENCOUNTER (OUTPATIENT)
Dept: SLEEP CENTER | Age: 65
Discharge: HOME | End: 2025-06-25
Payer: MEDICARE

## 2025-06-25 DIAGNOSIS — G47.33 OSA (OBSTRUCTIVE SLEEP APNEA): ICD-10-CM

## 2025-06-25 PROCEDURE — 95811 POLYSOM 6/>YRS CPAP 4/> PARM: CPT

## 2025-06-26 PROBLEM — G47.39 TREATMENT-EMERGENT CENTRAL SLEEP APNEA: Status: ACTIVE | Noted: 2025-06-26

## 2025-06-26 NOTE — PROGRESS NOTES
Aster Mayorga         : 1960  [] [facility]     [] [facility]      [] [facility]     [][employer]    [] [facility]  [] [facility]   [] [facility]  [] [employer] [facility] [] [employer] [] [employer] [] [facility] [] [employer] [] [facility]  Diagnosis: [x] EMI (G47.33) [] CSA (G47.31) [] Apnea (G47.30)   Length of Need: [] 12 Months [x] 99 Months [] Other:    Machine (OG!):  [x] ResMed AirSense     Auto [] Other:     [x]  CPAP () [] Bilevel ()   Mode: [x] Auto [] Spontaneous    Mode: [] Auto [] Spontaneous           Between 8 and 14 cm                 Comfort Settings:     If the order for CPAP  - Ramp Pressure: 5 cmH2O                                        - Ramp time: 15 min                                     -  Flex/EPR - 3 full time       Humidifier: [x] Heated ()        [x] Water chamber replacement ()/ 1 per 6 months        Mask:  Please always start with the mask the patient used during the titraion   [x] Nasal () /1 per 3 months    [x] Patient choice -Size and fit mask    [] Dispense: Eson2 Large     [x] Headgear () / 1 per 6 months    [x] Replacement Nasal Cushion ()/2 per month             Tubing: [x] Heated ()/1 per 3 months    [] Standard ()/1 per 3 months [] Other:           Filters: [x] Non-disposable ()/1 per 6 months     [x] Ultra-Fine, Disposable ()/2 per month        Miscellaneous: [x] Chin Strap ()/ 1 per 6 months [] O2 bleed-in:       LPM   [] Oximetry on CPAP/Bilevel []  Other:          Start Order Date: 25    MEDICAL JUSTIFICATION:  I, the undersigned, certify that the above prescribed supplies are medically necessary for this patient’s wellbeing.  In my opinion, the supplies are both reasonable and necessary in reference to accepted standards of medicalpractice in treatment of this patient’s condition.    Catarina Yousif MD      NPI: [id number]       Order Signed Date: 25    Electronically signed by

## 2025-06-26 NOTE — PROCEDURES
[facility]  [address]  [place], [place] [address]  Phone: [phone]   Fax: [phone]    POLYSOMNOGRAPHY with CPAP REPORT    NAME: Aster Mayorga                  NECK: 15.5 in. SEX: F   DATE: 6/25/2025 HEIGHT: 61.0 in. WEIGHT: 200.0 lbs.   ACCOUNT:   [id number] D.O. B 1960  AGE: 65 yrs.   LIGHTS OUT:   10:50:49 PM LIGHTS ON:   5:01:49 AM BMI:  37.8   ORDERING: Catarina Yousif MD REFERRING: follow up EPWORTH: 7   MEDICATIONS: Norvasc, Amoxil, Cozaar, Lipitor, vitamin D, Voltaren gel, Singulair, Ventolin HFA   HISTORY: Carpal tunnel, Allergic rhinitis, Diverticulosis, Primary hypertension, Primary osteoarthritis of both knees, History of total knee arthroplasty, Moderate persistent asthma, Severe obesity, Type 2 diabetes mellitus, EMI   INSURANCE:  Technical recording Information:  Gold plated electrodes were applied to the scalp according to the international 10-20 system.  EEG was monitored from C3-M2, C4-M1, 01-M2, 02-M1, F3-M2, and F4-M1.  EOG and EMG were monitored continuously by electrodes placed at the outer canthi and the chin, respectively. Nasal and oral airflow was monitored using a pressure transducer.  Respiratory effort was measured by inductance plethysmography employing an abdominal and thoracic belt. Blood oxygen saturation was monitored by pulse oximetry. Heart rate and rhythm were monitored by surface electrocardiogram. Anterior tibialis EMG was studied by using surface electrodes placed 5cm apart on both legs.  A microphone was used to monitor tracheal sounds and snoring.      SLEEP PROFILE:  SLEEP STAGES    Total recording time 371.0 minutes Stage N1 6.1 %   Total sleep time      343.5 minutes Stage N2     54.1 %   Sleep efficiency 92.6 % Stage N3 22.0 %   Sleep onset latency 7.5 minutes Stage R 17.8 %   REM onset latency 75.0 minutes         WASO 20.0 min       Respiratory Profile  At [place] PAP pressure  Oximetry:(room air)  At [place]

## 2025-06-27 ENCOUNTER — TELEPHONE (OUTPATIENT)
Dept: PULMONOLOGY | Age: 65
End: 2025-06-27

## 2025-06-27 NOTE — TELEPHONE ENCOUNTER
Titration study shows adequate control of the events at a CPAP pressure of 8 and 14 cm.      DME:     Patient will need appointment 31-90 days after starting new machine    Pt notified of results    Mychart message with DME to pt

## 2025-06-29 ENCOUNTER — PATIENT MESSAGE (OUTPATIENT)
Dept: PRIMARY CARE CLINIC | Age: 65
End: 2025-06-29

## 2025-07-06 DIAGNOSIS — E11.40 TYPE 2 DIABETES MELLITUS WITH DIABETIC NEUROPATHY, WITHOUT LONG-TERM CURRENT USE OF INSULIN (HCC): ICD-10-CM

## 2025-07-07 RX ORDER — ATORVASTATIN CALCIUM 40 MG/1
40 TABLET, FILM COATED ORAL DAILY
Qty: 100 TABLET | Refills: 2 | Status: SHIPPED | OUTPATIENT
Start: 2025-07-07

## 2025-07-07 NOTE — TELEPHONE ENCOUNTER
Medication:   Requested Prescriptions     Pending Prescriptions Disp Refills    atorvastatin (LIPITOR) 40 MG tablet [Pharmacy Med Name: Atorvastatin Calcium 40 MG Oral Tablet] 100 tablet 2     Sig: TAKE 1 TABLET BY MOUTH DAILY        Last Filled:      Patient Phone Number: [phone] (home)     Last appt: 5/7/2025   Next appt: 11/7/2025    Last OARRS:       3/27/2018     5:04 PM   RX Monitoring   Attestation The Prescription Monitoring Report for this patient was reviewed today.   Periodic Controlled Substance Monitoring Possible medication side effects, risk of tolerance/dependence & alternative treatments discussed.   Chronic Pain > 80 MEDD Treatment objectives documented - patient is progressing appropriately.

## 2025-07-09 ENCOUNTER — HOSPITAL ENCOUNTER (OUTPATIENT)
Dept: WOMENS IMAGING | Age: 65
Discharge: HOME | End: 2025-07-09
Payer: MEDICARE

## 2025-07-09 ENCOUNTER — APPOINTMENT (OUTPATIENT)
Dept: ULTRASOUND IMAGING | Age: 65
End: 2025-07-09
Payer: MEDICARE

## 2025-07-09 VITALS — HEIGHT: 61 IN | BODY MASS INDEX: 37.76 KG/M2 | WEIGHT: 200 LBS

## 2025-07-09 DIAGNOSIS — Z12.31 SCREENING MAMMOGRAM FOR BREAST CANCER: ICD-10-CM

## 2025-07-09 DIAGNOSIS — R92.1 BREAST CALCIFICATION SEEN ON MAMMOGRAM: ICD-10-CM

## 2025-07-09 PROCEDURE — G0279 TOMOSYNTHESIS, MAMMO: HCPCS

## 2025-07-22 DIAGNOSIS — I10 PRIMARY HYPERTENSION: Chronic | ICD-10-CM

## 2025-07-22 DIAGNOSIS — E11.40 TYPE 2 DIABETES MELLITUS WITH DIABETIC NEUROPATHY, WITHOUT LONG-TERM CURRENT USE OF INSULIN (HCC): ICD-10-CM

## 2025-07-23 ENCOUNTER — OFFICE VISIT (OUTPATIENT)
Dept: ORTHOPEDIC SURGERY | Age: 65
End: 2025-07-23

## 2025-07-23 VITALS — BODY MASS INDEX: 38.18 KG/M2 | WEIGHT: 202.2 LBS | HEIGHT: 61 IN

## 2025-07-23 DIAGNOSIS — M17.12 ARTHRITIS OF KNEE, LEFT: Primary | ICD-10-CM

## 2025-07-23 RX ORDER — TRIAMCINOLONE ACETONIDE 40 MG/ML
40 INJECTION, SUSPENSION INTRA-ARTICULAR; INTRAMUSCULAR ONCE
Status: COMPLETED | OUTPATIENT
Start: 2025-07-23 | End: 2025-07-23

## 2025-07-23 RX ORDER — BUPIVACAINE HYDROCHLORIDE 2.5 MG/ML
2 INJECTION, SOLUTION INFILTRATION; PERINEURAL ONCE
Status: COMPLETED | OUTPATIENT
Start: 2025-07-23 | End: 2025-07-23

## 2025-07-23 RX ORDER — LOSARTAN POTASSIUM 50 MG/1
50 TABLET ORAL DAILY
Qty: 100 TABLET | Refills: 2 | Status: SHIPPED | OUTPATIENT
Start: 2025-07-23

## 2025-07-23 RX ADMIN — BUPIVACAINE HYDROCHLORIDE 5 MG: 2.5 INJECTION, SOLUTION INFILTRATION; PERINEURAL at 10:06

## 2025-07-23 RX ADMIN — TRIAMCINOLONE ACETONIDE 40 MG: 40 INJECTION, SUSPENSION INTRA-ARTICULAR; INTRAMUSCULAR at 10:06

## 2025-07-23 NOTE — PROGRESS NOTES
developed and in no acute distress.    Ht 1.549 m (5' 1\")   Wt 91.7 kg (202 lb 3.2 oz)   LMP 06/01/2014   BMI 38.21 kg/m²      Examination of the left knee shows:   There is not erythema.   ROM- decreased range of motion is noted.  There is mild to moderate pain associated with ROM testing.   Extensor Mechanism is intact.     X Rays: was not performed in the office today:       Diagnosis       ICD-10-CM    1. Arthritis of knee, left  M17.12 DRAIN/INJECT LARGE JOINT/BURSA     BUPivacaine (MARCAINE) 0.25 % injection 5 mg     triamcinolone acetonide (KENALOG-40) injection 40 mg           Assessment/ Plan:     Assessment:  Recurrent left knee pain related to arthritis.     Plan:    Procedures-left intra-articular corticosteroid injection.  The Risks and benefits of corticosteroid injections were discussed today. She had ample time for discussion and questions were answered to her satisfaction. She verbally consented to proceed with intra-articular injection today.  Side effects of injections into the joints, muscles or spine can include but not limited to: Increased pain or discomfort for a few days, temporary bruising or collection of blood under the skin, flushing of the face, changes to your vision such as blurred vision, and infection causing redness, swelling and pain, loss of fat where the injection was given, paler skin around the site of injection.  If you are diabetic, your blood sugar level may go up for 3 days.  If you have high blood pressure, your blood pressure may go up for a few days.          PROCEDURE NOTE:  Pre op Diagnosis: left knee due to arthritis.  The left knee was prepped in standard sterile fashion with  Alcohol.   2 cc of 0.25% Marcaine and 1 cc of Kenalog 40 mg was injected into the left knee, lateral compartments without difficulty.  Procedure was tolerated well without difficulty and a band-aid was applied. Advised to use ice over the knee for 15-20 minutes to relieve any pain

## 2025-07-23 NOTE — TELEPHONE ENCOUNTER
Medication:   Requested Prescriptions     Pending Prescriptions Disp Refills    losartan (COZAAR) 50 MG tablet [Pharmacy Med Name: Losartan Potassium 50 MG Oral Tablet] 100 tablet 2     Sig: TAKE 1 TABLET BY MOUTH DAILY        Last Filled:      Patient Phone Number: [phone] (home)     Last appt: 5/7/2025   Next appt: 11/7/2025    Last OARRS:       3/27/2018     5:04 PM   RX Monitoring   Attestation The Prescription Monitoring Report for this patient was reviewed today.   Periodic Controlled Substance Monitoring Possible medication side effects, risk of tolerance/dependence & alternative treatments discussed.   Chronic Pain > 80 MEDD Treatment objectives documented - patient is progressing appropriately.

## 2025-08-19 DIAGNOSIS — E11.40 TYPE 2 DIABETES MELLITUS WITH DIABETIC NEUROPATHY, WITHOUT LONG-TERM CURRENT USE OF INSULIN (HCC): ICD-10-CM

## 2025-08-21 RX ORDER — BLOOD SUGAR DIAGNOSTIC
1 STRIP MISCELLANEOUS DAILY
Qty: 100 EACH | Refills: 5 | Status: SHIPPED | OUTPATIENT
Start: 2025-08-21

## (undated) DEVICE — BANDAGE COMPR W6INXL12FT SMOOTH FOR LIMB EXSANG ESMARCH

## (undated) DEVICE — TOTAL BASIC PK

## (undated) DEVICE — SUTURE ABSORBABLE MONOFILAMENT 1-0 OS8 14 IN STRATAFIX SPRL SXPD2B202

## (undated) DEVICE — Z DUP USE 2701075 SYSTEM SKIN CLSR 42CM DERMBND PRINEO

## (undated) DEVICE — 1010 S-DRAPE TOWEL DRAPE 10/BX: Brand: STERI-DRAPE™

## (undated) DEVICE — CHLORAPREP 26ML ORANGE

## (undated) DEVICE — COVER LT HNDL BLU PLAS

## (undated) DEVICE — SOLUTION IV 1000ML 0.9% SOD CHL FOR IRRIG PLAS CONT

## (undated) DEVICE — ELECTRODE PT RET AD L9FT HI MOIST COND ADH HYDRGEL CORDED

## (undated) DEVICE — BANDAGE COMPR W4INXL15FT BGE E SGL LAYERED CLP CLSR

## (undated) DEVICE — PIN BNE FIX TEMP L140MM DIA4MM MAKO

## (undated) DEVICE — SYRINGE MED 30ML STD CLR PLAS LUERLOCK TIP N CTRL DISP

## (undated) DEVICE — CUTTER SURG OD38MM PAT KNEE DISP FOR RM SYS XCELERATE

## (undated) DEVICE — Z DISCONTINUED USE 2716304 SUTURE STRATAFIX SPRL SZ 3-0 L12IN ABSRB UD FS-1 L24MM 3/8

## (undated) DEVICE — SUTURE STRATAFIX SPRL SZ 2 0 L14IN ABSRB UD MH L36MM 1 2 CIR SXMD2B401

## (undated) DEVICE — COVER,MAYO STAND,STERILE: Brand: MEDLINE

## (undated) DEVICE — KIT INT FIX FEM TIB CKPT MAKOPLASTY

## (undated) DEVICE — GOWN,REINF,POLY,AURORA,XLNG/XXL,STRL: Brand: MEDLINE

## (undated) DEVICE — FIAPC® PROBE W/ FILTER 2200 A OD 2.3MM/6.9FR; L 2.2M/7.2FT: Brand: ERBE

## (undated) DEVICE — DECANTER BAG 9": Brand: MEDLINE INDUSTRIES, INC.

## (undated) DEVICE — KIT DRP FOR RIO ROBOTIC ARM ASST SYS

## (undated) DEVICE — ZIMMER® STERILE DISPOSABLE TOURNIQUET CUFF WITH PLC, DUAL PORT, SINGLE BLADDER, 34 IN. (86 CM)

## (undated) DEVICE — PAD,NON-ADHERENT,3X8,STERILE,LF,1/PK: Brand: MEDLINE

## (undated) DEVICE — SOLUTION IV IRRIG POUR BRL 0.9% SODIUM CHL 2F7124

## (undated) DEVICE — KIT TRK KNEE PROC VIZADISC

## (undated) DEVICE — 3M™ COBAN™ NL STERILE NON-LATEX SELF-ADHERENT WRAP, 2083S, 3 IN X 5 YD (7,5 CM X 4,5 M), 24 ROLLS/CASE: Brand: 3M™ COBAN™

## (undated) DEVICE — SOLUTION PREP POVIDONE IOD FOR SKIN MUCOUS MEM PRIOR TO

## (undated) DEVICE — SHEET,DRAPE,53X77,STERILE: Brand: MEDLINE

## (undated) DEVICE — GLOVE ORANGE PI 8 1/2   MSG9085

## (undated) DEVICE — COTTON UNDERCAST PADDING,CRIMPED FINISH: Brand: WEBRIL

## (undated) DEVICE — GLOVE ORANGE PI 8   MSG9080

## (undated) DEVICE — COVER,TABLE,77X90,STERILE: Brand: MEDLINE

## (undated) DEVICE — MATTRESS MAXI AIR TRNSF SGL PT USE DCS 37 45 48 52 75

## (undated) DEVICE — PIN BNE FIX L110MM DIA32MM

## (undated) DEVICE — 4-PORT MANIFOLD: Brand: NEPTUNE 2

## (undated) DEVICE — HYPODERMIC SAFETY NEEDLE: Brand: MAGELLAN

## (undated) DEVICE — ERBE NESSY®PLATE 170 SPLIT; 168CM²; CABLE 3M: Brand: ERBE

## (undated) DEVICE — CORD RETRCT SIL

## (undated) DEVICE — MERCY HEALTH WEST TURNOVER: Brand: MEDLINE INDUSTRIES, INC.

## (undated) DEVICE — HANDPIECE SET WITH HIGH FLOW TIP AND SUCTION TUBE: Brand: INTERPULSE

## (undated) DEVICE — SUTURE VCRL SZ 1 L18IN ABSRB UD L36MM CT-1 1/2 CIR J841D

## (undated) DEVICE — BLADE SURG SAW STD S STL OSC W/ SERR EDGE DISP

## (undated) DEVICE — GLOVE SURG SZ 85 L12IN FNGR THK13MIL WHT ISOLEX POLYISOPRENE

## (undated) DEVICE — STERILE TOTAL KNEE DRAPE PACK: Brand: CARDINAL HEALTH